# Patient Record
Sex: MALE | Race: WHITE | NOT HISPANIC OR LATINO | URBAN - METROPOLITAN AREA
[De-identification: names, ages, dates, MRNs, and addresses within clinical notes are randomized per-mention and may not be internally consistent; named-entity substitution may affect disease eponyms.]

---

## 2017-08-01 ENCOUNTER — OUTPATIENT (OUTPATIENT)
Dept: OUTPATIENT SERVICES | Facility: HOSPITAL | Age: 82
LOS: 1 days | End: 2017-08-01
Payer: MEDICARE

## 2017-08-01 VITALS
HEIGHT: 64 IN | RESPIRATION RATE: 16 BRPM | DIASTOLIC BLOOD PRESSURE: 58 MMHG | TEMPERATURE: 98 F | OXYGEN SATURATION: 97 % | SYSTOLIC BLOOD PRESSURE: 126 MMHG | HEART RATE: 76 BPM | WEIGHT: 205.03 LBS

## 2017-08-01 DIAGNOSIS — Z90.49 ACQUIRED ABSENCE OF OTHER SPECIFIED PARTS OF DIGESTIVE TRACT: Chronic | ICD-10-CM

## 2017-08-01 DIAGNOSIS — M19.90 UNSPECIFIED OSTEOARTHRITIS, UNSPECIFIED SITE: ICD-10-CM

## 2017-08-01 DIAGNOSIS — Z98.890 OTHER SPECIFIED POSTPROCEDURAL STATES: Chronic | ICD-10-CM

## 2017-08-01 DIAGNOSIS — Z01.818 ENCOUNTER FOR OTHER PREPROCEDURAL EXAMINATION: ICD-10-CM

## 2017-08-01 DIAGNOSIS — M16.12 UNILATERAL PRIMARY OSTEOARTHRITIS, LEFT HIP: ICD-10-CM

## 2017-08-01 LAB
ALBUMIN SERPL ELPH-MCNC: 3.9 G/DL — SIGNIFICANT CHANGE UP (ref 3.3–5)
ALP SERPL-CCNC: 139 U/L — HIGH (ref 30–120)
ALT FLD-CCNC: 18 U/L DA — SIGNIFICANT CHANGE UP (ref 10–60)
ANION GAP SERPL CALC-SCNC: 8 MMOL/L — SIGNIFICANT CHANGE UP (ref 5–17)
APTT BLD: 33.6 SEC — SIGNIFICANT CHANGE UP (ref 27.5–37.4)
AST SERPL-CCNC: 24 U/L — SIGNIFICANT CHANGE UP (ref 10–40)
BILIRUB SERPL-MCNC: 0.6 MG/DL — SIGNIFICANT CHANGE UP (ref 0.2–1.2)
BLD GP AB SCN SERPL QL: SIGNIFICANT CHANGE UP
BUN SERPL-MCNC: 40 MG/DL — HIGH (ref 7–23)
CALCIUM SERPL-MCNC: 9.5 MG/DL — SIGNIFICANT CHANGE UP (ref 8.4–10.5)
CHLORIDE SERPL-SCNC: 106 MMOL/L — SIGNIFICANT CHANGE UP (ref 96–108)
CO2 SERPL-SCNC: 30 MMOL/L — SIGNIFICANT CHANGE UP (ref 22–31)
CREAT SERPL-MCNC: 2.1 MG/DL — HIGH (ref 0.5–1.3)
GLUCOSE SERPL-MCNC: 102 MG/DL — HIGH (ref 70–99)
HCT VFR BLD CALC: 39 % — SIGNIFICANT CHANGE UP (ref 39–50)
HGB BLD-MCNC: 12.8 G/DL — LOW (ref 13–17)
INR BLD: 1.05 RATIO — SIGNIFICANT CHANGE UP (ref 0.88–1.16)
MCHC RBC-ENTMCNC: 30.5 PG — SIGNIFICANT CHANGE UP (ref 27–34)
MCHC RBC-ENTMCNC: 32.7 GM/DL — SIGNIFICANT CHANGE UP (ref 32–36)
MCV RBC AUTO: 93.2 FL — SIGNIFICANT CHANGE UP (ref 80–100)
PLATELET # BLD AUTO: 199 K/UL — SIGNIFICANT CHANGE UP (ref 150–400)
POTASSIUM SERPL-MCNC: 3.8 MMOL/L — SIGNIFICANT CHANGE UP (ref 3.5–5.3)
POTASSIUM SERPL-SCNC: 3.8 MMOL/L — SIGNIFICANT CHANGE UP (ref 3.5–5.3)
PROT SERPL-MCNC: 7.2 G/DL — SIGNIFICANT CHANGE UP (ref 6–8.3)
PROTHROM AB SERPL-ACNC: 11.5 SEC — SIGNIFICANT CHANGE UP (ref 9.8–12.7)
RBC # BLD: 4.18 M/UL — LOW (ref 4.2–5.8)
RBC # FLD: 12.1 % — SIGNIFICANT CHANGE UP (ref 10.3–14.5)
SODIUM SERPL-SCNC: 144 MMOL/L — SIGNIFICANT CHANGE UP (ref 135–145)
WBC # BLD: 6.5 K/UL — SIGNIFICANT CHANGE UP (ref 3.8–10.5)
WBC # FLD AUTO: 6.5 K/UL — SIGNIFICANT CHANGE UP (ref 3.8–10.5)

## 2017-08-01 PROCEDURE — 87640 STAPH A DNA AMP PROBE: CPT

## 2017-08-01 PROCEDURE — 80053 COMPREHEN METABOLIC PANEL: CPT

## 2017-08-01 PROCEDURE — 86901 BLOOD TYPING SEROLOGIC RH(D): CPT

## 2017-08-01 PROCEDURE — 87641 MR-STAPH DNA AMP PROBE: CPT

## 2017-08-01 PROCEDURE — 86900 BLOOD TYPING SEROLOGIC ABO: CPT

## 2017-08-01 PROCEDURE — 85730 THROMBOPLASTIN TIME PARTIAL: CPT

## 2017-08-01 PROCEDURE — 93010 ELECTROCARDIOGRAM REPORT: CPT | Mod: NC

## 2017-08-01 PROCEDURE — 86850 RBC ANTIBODY SCREEN: CPT

## 2017-08-01 PROCEDURE — 93005 ELECTROCARDIOGRAM TRACING: CPT

## 2017-08-01 PROCEDURE — 71046 X-RAY EXAM CHEST 2 VIEWS: CPT

## 2017-08-01 PROCEDURE — 71020: CPT | Mod: 26

## 2017-08-01 PROCEDURE — G0463: CPT

## 2017-08-01 PROCEDURE — 36415 COLL VENOUS BLD VENIPUNCTURE: CPT

## 2017-08-01 PROCEDURE — 85610 PROTHROMBIN TIME: CPT

## 2017-08-01 PROCEDURE — 85027 COMPLETE CBC AUTOMATED: CPT

## 2017-08-01 NOTE — H&P PST ADULT - PMH
Fluid retention in legs    Prostate cancer  48 treatments of RT  Seasonal allergies Fluid retention in legs    Hearing loss    Osteoarthritis    Prostate cancer  48 treatments of RT  Seasonal allergies

## 2017-08-01 NOTE — H&P PST ADULT - HISTORY OF PRESENT ILLNESS
85 yo male presents with long history of left hip pain that has increased in severity over past 1 year.  Pain is intermittent and is 1/10 at rest and increases to 8/10 with activity.  Ambulates with walker and takes NSAIDS for pain relief.

## 2017-08-01 NOTE — H&P PST ADULT - ABILITY TO HEAR (WITH HEARING AID OR HEARING APPLIANCE IF NORMALLY USED):
Mildly to Moderately Impaired: difficulty hearing in some environments or speaker may need to increase volume or speak distinctly Mildly to Moderately Impaired: difficulty hearing in some environments or speaker may need to increase volume or speak distinctly/b/l hearing aids

## 2017-08-01 NOTE — H&P PST ADULT - MUSCULOSKELETAL
details… detailed exam decreased ROM/diminished strength/decreased ROM due to pain/no calf tenderness

## 2017-08-01 NOTE — H&P PST ADULT - NSANTHOSAYNRD_GEN_A_CORE
No. DEJA screening performed.  STOP BANG Legend: 0-2 = LOW Risk; 3-4 = INTERMEDIATE Risk; 5-8 = HIGH Risk

## 2017-08-01 NOTE — H&P PST ADULT - FAMILY HISTORY
Mother  Still living? No  Family history of rheumatic heart disease, Age at diagnosis: Age Unknown     Father  Still living? No  Family history of coronary artery disease, Age at diagnosis: Age Unknown

## 2017-08-02 LAB
MRSA PCR RESULT.: SIGNIFICANT CHANGE UP
S AUREUS DNA NOSE QL NAA+PROBE: SIGNIFICANT CHANGE UP

## 2017-08-11 RX ORDER — SODIUM CHLORIDE 9 MG/ML
1000 INJECTION, SOLUTION INTRAVENOUS
Qty: 0 | Refills: 0 | Status: DISCONTINUED | OUTPATIENT
Start: 2017-08-17 | End: 2017-08-17

## 2017-08-16 RX ORDER — MAGNESIUM HYDROXIDE 400 MG/1
30 TABLET, CHEWABLE ORAL DAILY
Qty: 0 | Refills: 0 | Status: DISCONTINUED | OUTPATIENT
Start: 2017-08-17 | End: 2017-08-19

## 2017-08-16 RX ORDER — DOCUSATE SODIUM 100 MG
100 CAPSULE ORAL THREE TIMES A DAY
Qty: 0 | Refills: 0 | Status: DISCONTINUED | OUTPATIENT
Start: 2017-08-17 | End: 2017-08-19

## 2017-08-16 RX ORDER — POLYETHYLENE GLYCOL 3350 17 G/17G
17 POWDER, FOR SOLUTION ORAL DAILY
Qty: 0 | Refills: 0 | Status: DISCONTINUED | OUTPATIENT
Start: 2017-08-17 | End: 2017-08-19

## 2017-08-16 RX ORDER — ONDANSETRON 8 MG/1
4 TABLET, FILM COATED ORAL EVERY 6 HOURS
Qty: 0 | Refills: 0 | Status: DISCONTINUED | OUTPATIENT
Start: 2017-08-17 | End: 2017-08-19

## 2017-08-16 RX ORDER — SENNA PLUS 8.6 MG/1
2 TABLET ORAL AT BEDTIME
Qty: 0 | Refills: 0 | Status: DISCONTINUED | OUTPATIENT
Start: 2017-08-17 | End: 2017-08-19

## 2017-08-16 RX ORDER — APREPITANT 80 MG/1
40 CAPSULE ORAL ONCE
Qty: 0 | Refills: 0 | Status: COMPLETED | OUTPATIENT
Start: 2017-08-17 | End: 2017-08-17

## 2017-08-16 RX ORDER — PANTOPRAZOLE SODIUM 20 MG/1
40 TABLET, DELAYED RELEASE ORAL
Qty: 0 | Refills: 0 | Status: DISCONTINUED | OUTPATIENT
Start: 2017-08-17 | End: 2017-08-19

## 2017-08-16 RX ORDER — SODIUM CHLORIDE 9 MG/ML
1000 INJECTION, SOLUTION INTRAVENOUS
Qty: 0 | Refills: 0 | Status: DISCONTINUED | OUTPATIENT
Start: 2017-08-17 | End: 2017-08-18

## 2017-08-16 NOTE — PATIENT PROFILE ADULT. - PMH
Fluid retention in legs    Hearing loss    Osteoarthritis    Prostate cancer  48 treatments of RT  Seasonal allergies

## 2017-08-17 ENCOUNTER — INPATIENT (INPATIENT)
Facility: HOSPITAL | Age: 82
LOS: 1 days | Discharge: NURSING, REHAB OR LONG-TERM CARE | DRG: 470 | End: 2017-08-19
Attending: ORTHOPAEDIC SURGERY | Admitting: ORTHOPAEDIC SURGERY
Payer: MEDICARE

## 2017-08-17 ENCOUNTER — TRANSCRIPTION ENCOUNTER (OUTPATIENT)
Age: 82
End: 2017-08-17

## 2017-08-17 VITALS
RESPIRATION RATE: 11 BRPM | SYSTOLIC BLOOD PRESSURE: 132 MMHG | HEART RATE: 76 BPM | HEIGHT: 64 IN | DIASTOLIC BLOOD PRESSURE: 57 MMHG | WEIGHT: 198.86 LBS | OXYGEN SATURATION: 95 % | TEMPERATURE: 99 F

## 2017-08-17 DIAGNOSIS — M16.12 UNILATERAL PRIMARY OSTEOARTHRITIS, LEFT HIP: ICD-10-CM

## 2017-08-17 DIAGNOSIS — H91.90 UNSPECIFIED HEARING LOSS, UNSPECIFIED EAR: ICD-10-CM

## 2017-08-17 DIAGNOSIS — Z47.1 AFTERCARE FOLLOWING JOINT REPLACEMENT SURGERY: ICD-10-CM

## 2017-08-17 DIAGNOSIS — M19.90 UNSPECIFIED OSTEOARTHRITIS, UNSPECIFIED SITE: ICD-10-CM

## 2017-08-17 DIAGNOSIS — J30.2 OTHER SEASONAL ALLERGIC RHINITIS: ICD-10-CM

## 2017-08-17 DIAGNOSIS — Z90.49 ACQUIRED ABSENCE OF OTHER SPECIFIED PARTS OF DIGESTIVE TRACT: Chronic | ICD-10-CM

## 2017-08-17 DIAGNOSIS — Z98.890 OTHER SPECIFIED POSTPROCEDURAL STATES: Chronic | ICD-10-CM

## 2017-08-17 DIAGNOSIS — R60.0 LOCALIZED EDEMA: ICD-10-CM

## 2017-08-17 DIAGNOSIS — C61 MALIGNANT NEOPLASM OF PROSTATE: ICD-10-CM

## 2017-08-17 LAB
ANION GAP SERPL CALC-SCNC: 8 MMOL/L — SIGNIFICANT CHANGE UP (ref 5–17)
BUN SERPL-MCNC: 29 MG/DL — HIGH (ref 7–23)
CALCIUM SERPL-MCNC: 8.5 MG/DL — SIGNIFICANT CHANGE UP (ref 8.4–10.5)
CHLORIDE SERPL-SCNC: 110 MMOL/L — HIGH (ref 96–108)
CO2 SERPL-SCNC: 25 MMOL/L — SIGNIFICANT CHANGE UP (ref 22–31)
CREAT SERPL-MCNC: 1.43 MG/DL — HIGH (ref 0.5–1.3)
GLUCOSE SERPL-MCNC: 146 MG/DL — HIGH (ref 70–99)
HCT VFR BLD CALC: 34.2 % — LOW (ref 39–50)
HGB BLD-MCNC: 11.3 G/DL — LOW (ref 13–17)
MCHC RBC-ENTMCNC: 31.2 PG — SIGNIFICANT CHANGE UP (ref 27–34)
MCHC RBC-ENTMCNC: 32.9 GM/DL — SIGNIFICANT CHANGE UP (ref 32–36)
MCV RBC AUTO: 94.7 FL — SIGNIFICANT CHANGE UP (ref 80–100)
PLATELET # BLD AUTO: 151 K/UL — SIGNIFICANT CHANGE UP (ref 150–400)
POTASSIUM SERPL-MCNC: 3.9 MMOL/L — SIGNIFICANT CHANGE UP (ref 3.5–5.3)
POTASSIUM SERPL-SCNC: 3.9 MMOL/L — SIGNIFICANT CHANGE UP (ref 3.5–5.3)
RBC # BLD: 3.62 M/UL — LOW (ref 4.2–5.8)
RBC # FLD: 11.9 % — SIGNIFICANT CHANGE UP (ref 10.3–14.5)
SODIUM SERPL-SCNC: 143 MMOL/L — SIGNIFICANT CHANGE UP (ref 135–145)
WBC # BLD: 8.2 K/UL — SIGNIFICANT CHANGE UP (ref 3.8–10.5)
WBC # FLD AUTO: 8.2 K/UL — SIGNIFICANT CHANGE UP (ref 3.8–10.5)

## 2017-08-17 PROCEDURE — 88311 DECALCIFY TISSUE: CPT | Mod: 26

## 2017-08-17 PROCEDURE — 88305 TISSUE EXAM BY PATHOLOGIST: CPT | Mod: 26

## 2017-08-17 PROCEDURE — 99223 1ST HOSP IP/OBS HIGH 75: CPT

## 2017-08-17 RX ORDER — BICALUTAMIDE 50 MG/1
50 TABLET, FILM COATED ORAL DAILY
Qty: 0 | Refills: 0 | Status: DISCONTINUED | OUTPATIENT
Start: 2017-08-17 | End: 2017-08-19

## 2017-08-17 RX ORDER — HYDROMORPHONE HYDROCHLORIDE 2 MG/ML
0.5 INJECTION INTRAMUSCULAR; INTRAVENOUS; SUBCUTANEOUS
Qty: 0 | Refills: 0 | Status: DISCONTINUED | OUTPATIENT
Start: 2017-08-17 | End: 2017-08-19

## 2017-08-17 RX ORDER — DOXAZOSIN MESYLATE 4 MG
1 TABLET ORAL
Qty: 0 | Refills: 0 | COMMUNITY

## 2017-08-17 RX ORDER — HYDROMORPHONE HYDROCHLORIDE 2 MG/ML
0.5 INJECTION INTRAMUSCULAR; INTRAVENOUS; SUBCUTANEOUS
Qty: 0 | Refills: 0 | Status: DISCONTINUED | OUTPATIENT
Start: 2017-08-17 | End: 2017-08-17

## 2017-08-17 RX ORDER — CEFAZOLIN SODIUM 1 G
2000 VIAL (EA) INJECTION ONCE
Qty: 0 | Refills: 0 | Status: COMPLETED | OUTPATIENT
Start: 2017-08-17 | End: 2017-08-17

## 2017-08-17 RX ORDER — CEFAZOLIN SODIUM 1 G
2000 VIAL (EA) INJECTION EVERY 8 HOURS
Qty: 0 | Refills: 0 | Status: COMPLETED | OUTPATIENT
Start: 2017-08-17 | End: 2017-08-17

## 2017-08-17 RX ORDER — ONDANSETRON 8 MG/1
4 TABLET, FILM COATED ORAL ONCE
Qty: 0 | Refills: 0 | Status: DISCONTINUED | OUTPATIENT
Start: 2017-08-17 | End: 2017-08-17

## 2017-08-17 RX ORDER — OXYCODONE HYDROCHLORIDE 5 MG/1
10 TABLET ORAL
Qty: 0 | Refills: 0 | Status: DISCONTINUED | OUTPATIENT
Start: 2017-08-17 | End: 2017-08-19

## 2017-08-17 RX ORDER — DOXAZOSIN MESYLATE 4 MG
4 TABLET ORAL DAILY
Qty: 0 | Refills: 0 | Status: DISCONTINUED | OUTPATIENT
Start: 2017-08-17 | End: 2017-08-19

## 2017-08-17 RX ORDER — METOPROLOL TARTRATE 50 MG
25 TABLET ORAL DAILY
Qty: 0 | Refills: 0 | Status: DISCONTINUED | OUTPATIENT
Start: 2017-08-17 | End: 2017-08-19

## 2017-08-17 RX ORDER — SODIUM CHLORIDE 9 MG/ML
1000 INJECTION, SOLUTION INTRAVENOUS
Qty: 0 | Refills: 0 | Status: DISCONTINUED | OUTPATIENT
Start: 2017-08-17 | End: 2017-08-17

## 2017-08-17 RX ORDER — ACETAMINOPHEN 500 MG
1000 TABLET ORAL ONCE
Qty: 0 | Refills: 0 | Status: COMPLETED | OUTPATIENT
Start: 2017-08-17 | End: 2017-08-17

## 2017-08-17 RX ORDER — BENZOCAINE AND MENTHOL 5; 1 G/100ML; G/100ML
1 LIQUID ORAL EVERY 4 HOURS
Qty: 0 | Refills: 0 | Status: DISCONTINUED | OUTPATIENT
Start: 2017-08-17 | End: 2017-08-19

## 2017-08-17 RX ORDER — LORATADINE 10 MG/1
10 TABLET ORAL DAILY
Qty: 0 | Refills: 0 | Status: DISCONTINUED | OUTPATIENT
Start: 2017-08-17 | End: 2017-08-19

## 2017-08-17 RX ORDER — APIXABAN 2.5 MG/1
2.5 TABLET, FILM COATED ORAL EVERY 12 HOURS
Qty: 0 | Refills: 0 | Status: DISCONTINUED | OUTPATIENT
Start: 2017-08-18 | End: 2017-08-19

## 2017-08-17 RX ORDER — OXYCODONE HYDROCHLORIDE 5 MG/1
5 TABLET ORAL
Qty: 0 | Refills: 0 | Status: DISCONTINUED | OUTPATIENT
Start: 2017-08-17 | End: 2017-08-19

## 2017-08-17 RX ORDER — ACETAMINOPHEN 500 MG
1000 TABLET ORAL EVERY 8 HOURS
Qty: 0 | Refills: 0 | Status: DISCONTINUED | OUTPATIENT
Start: 2017-08-18 | End: 2017-08-19

## 2017-08-17 RX ORDER — ACETAMINOPHEN 500 MG
1000 TABLET ORAL EVERY 6 HOURS
Qty: 0 | Refills: 0 | Status: COMPLETED | OUTPATIENT
Start: 2017-08-17 | End: 2017-08-18

## 2017-08-17 RX ADMIN — Medication 400 MILLIGRAM(S): at 16:43

## 2017-08-17 RX ADMIN — SODIUM CHLORIDE 100 MILLILITER(S): 9 INJECTION, SOLUTION INTRAVENOUS at 11:44

## 2017-08-17 RX ADMIN — HYDROMORPHONE HYDROCHLORIDE 0.5 MILLIGRAM(S): 2 INJECTION INTRAMUSCULAR; INTRAVENOUS; SUBCUTANEOUS at 11:43

## 2017-08-17 RX ADMIN — Medication 1000 MILLIGRAM(S): at 17:15

## 2017-08-17 RX ADMIN — Medication 1000 MILLIGRAM(S): at 21:58

## 2017-08-17 RX ADMIN — ONDANSETRON 4 MILLIGRAM(S): 8 TABLET, FILM COATED ORAL at 14:58

## 2017-08-17 RX ADMIN — Medication 400 MILLIGRAM(S): at 21:52

## 2017-08-17 RX ADMIN — Medication 100 MILLIGRAM(S): at 21:53

## 2017-08-17 RX ADMIN — HYDROMORPHONE HYDROCHLORIDE 0.5 MILLIGRAM(S): 2 INJECTION INTRAMUSCULAR; INTRAVENOUS; SUBCUTANEOUS at 12:05

## 2017-08-17 RX ADMIN — APREPITANT 40 MILLIGRAM(S): 80 CAPSULE ORAL at 07:31

## 2017-08-17 RX ADMIN — Medication 100 MILLIGRAM(S): at 23:34

## 2017-08-17 RX ADMIN — SODIUM CHLORIDE 100 MILLILITER(S): 9 INJECTION, SOLUTION INTRAVENOUS at 16:43

## 2017-08-17 RX ADMIN — Medication 100 MILLIGRAM(S): at 16:43

## 2017-08-17 RX ADMIN — HYDROMORPHONE HYDROCHLORIDE 0.5 MILLIGRAM(S): 2 INJECTION INTRAMUSCULAR; INTRAVENOUS; SUBCUTANEOUS at 14:11

## 2017-08-17 RX ADMIN — SODIUM CHLORIDE 75 MILLILITER(S): 9 INJECTION, SOLUTION INTRAVENOUS at 07:31

## 2017-08-17 NOTE — DISCHARGE NOTE ADULT - CARE PROVIDER_API CALL
HARVEY Perry (MD), Orthopaedic Surgery  [address] [place] [address]  Phone: [phone]  Fax: [phone]

## 2017-08-17 NOTE — PHYSICAL THERAPY INITIAL EVALUATION ADULT - GAIT DEVIATIONS NOTED, PT EVAL
decreased step length/decreased claudia/decreased weight-shifting ability/decreased stride length/decreased velocity of limb motion

## 2017-08-17 NOTE — BRIEF OPERATIVE NOTE - PROCEDURE
Total hip replacement  08/17/2017    Active  [id number] Total hip replacement  08/17/2017    Active  Levi Urban

## 2017-08-17 NOTE — CONSULT NOTE ADULT - SUBJECTIVE AND OBJECTIVE BOX
Patient is a 84y old  Male who presents with a chief complaint of "left hip" (16 Aug 2017 16:39)      HPI: pt is a 85 yo male with PMH of OA, hearing loss, prostate cancer who has left hip pain for long time but worse since last year. it did not respond to medical therapy, worse on walking, better with rest.  she just had left THR with out complication.    PAST MEDICAL & SURGICAL HISTORY:  Hearing loss  Osteoarthritis  Fluid retention in legs  Seasonal allergies  Prostate cancer: 48 treatments of RT  S/P inguinal hernia repair: 1961  S/P cholecystectomy: 1985  History of appendectomy      REVIEW OF SYSTEMS:    CONSTITUTIONAL: No fever, weight loss, or fatigue  EYES: No eye pain, visual disturbances, or discharge  ENMT:  No difficulty hearing, tinnitus, vertigo; No sinus or throat pain  NECK: No pain or stiffness  BREASTS: No pain, masses, or nipple discharge  RESPIRATORY: No cough, wheezing, chills or hemoptysis; No shortness of breath  CARDIOVASCULAR: No chest pain, palpitations, dizziness, or leg swelling  GASTROINTESTINAL: No abdominal or epigastric pain. No nausea, vomiting, or hematemesis; No diarrhea or constipation. No melena or hematochezia.  GENITOURINARY: No dysuria, frequency, hematuria, or incontinence  NEUROLOGICAL: No headaches, memory loss, loss of strength, numbness, or tremors  SKIN: No itching, burning, rashes, or lesions   LYMPH NODES: No enlarged glands  ENDOCRINE: No heat or cold intolerance; No hair loss  MUSCULOSKELETAL: No muscle or back pain  PSYCHIATRIC: No depression, anxiety, mood swings, or difficulty sleeping  HEME/LYMPH: No easy bruising, or bleeding gums  ALLERGY AND IMMUNOLOGIC: No hives or eczema      MEDICATIONS  (STANDING): reviewed     MEDICATIONS  (PRN): reviewed       Allergies    No Known Allergies    Intolerances        SOCIAL HISTORY:  Alochol: Denied  Smoking: Nonsmoker  Drug Use: Denied  Marital Status:           FAMILY HISTORY:  Family history of coronary artery disease (Father)  Family history of rheumatic heart disease (Mother)      Vital Signs Last 24 Hrs  T(C): 37 (17 Aug 2017 07:17), Max: 37 (17 Aug 2017 07:17)  T(F): 98.6 (17 Aug 2017 07:17), Max: 98.6 (17 Aug 2017 07:17)  HR: 76 (17 Aug 2017 07:17) (76 - 76)  BP: 132/57 (17 Aug 2017 07:17) (132/57 - 132/57)  BP(mean): --  RR: 11 (17 Aug 2017 07:17) (11 - 11)  SpO2: 95% (17 Aug 2017 07:17) (95% - 95%)    PHYSICAL EXAM:    GENERAL: NAD, well-groomed, well-developed  HEAD:  Atraumatic, Normocephalic  EYES: EOMI, PERRLA, conjunctiva and sclera clear  ENMT: No tonsillar erythema, exudates, or enlargement; Moist mucous membranes, Good dentition, No lesions  NECK: Supple, No JVD, Normal thyroid  NERVOUS SYSTEM:  Alert & Oriented X3, Good concentration; Motor Strength 5/5 B/L upper and lower extremities; DTRs 2+ intact and symmetric  CHEST/LUNG: Clear to percussion bilaterally; No rales, rhonchi, wheezing, or rubs  HEART: Regular rate and rhythm; No murmurs, rubs, or gallops  ABDOMEN: Soft, Nontender, Nondistended; Bowel sounds present  EXTREMITIES:  2+ Peripheral Pulses, No clubbing, cyanosis, or edema  LYMPH: No lymphadenopathy notedRECTAL: No masses, prostate normal size and smooth, Guiac negative   BREAST: No palpatble masses, skin no lesions no retractions, no discharages. adenexa no palpable masses noted   GYN: uterus normal size, adenexa no palpable masses, no CMT, no uterine discharge   SKIN: No rashes or lesions  INCISION:     LABS:              CAPILLARY BLOOD GLUCOSE          RADIOLOGY & ADDITIONAL STUDIES:

## 2017-08-17 NOTE — DISCHARGE NOTE ADULT - PATIENT PORTAL LINK FT
“You can access the [facility] Patient Portal, offered by [facility], by registering with the following website: [email or web address]”

## 2017-08-17 NOTE — DISCHARGE NOTE ADULT - NS AS ACTIVITY OBS
Walking-Indoors allowed/Showering allowed/Do not drive or operate machinery/Do not make important decisions/Stairs allowed

## 2017-08-17 NOTE — PROGRESS NOTE ADULT - SUBJECTIVE AND OBJECTIVE BOX
POST OPERATIVE DAY #: 0    84y Male  s/p  Left  Anterior THR                        SUBJECTIVE: Patient seen and examined at bedside. c/o nausea no vomiting     Pain:  well controlled     Pain scale:   2/10  Denies CP, SOB, V/D, weakness, numbness   No new complains     OBJECTIVE:     Vital Signs Last 24 Hrs  T(C): 36.1 (17 Aug 2017 14:48), Max: 37 (17 Aug 2017 07:17)  T(F): 97 (17 Aug 2017 14:48), Max: 98.6 (17 Aug 2017 07:17)  HR: 64 (17 Aug 2017 14:48) (60 - 89)  BP: 110/50 (17 Aug 2017 14:48) (102/76 - 132/57)  BP(mean): --  RR: 18 (17 Aug 2017 14:48) (11 - 20)  SpO2: 98% (17 Aug 2017 14:48) (95% - 100%)    Affected extremity: LLE         Dressing: clean/dry/intact                     HV x2 intact, on self suction         Sensation: intact to light touch          Motor exam: 5  / 5 Tibialis Anterior/Gastrocnemius-Soleus, EHL/FHL         warm, well-perfused; capillary refill < 3 seconds         negative calf tenderness B/L LE  HV1: 30cc  HV2:0cc    MEDICATIONS:  Infection prophylaxis:  ceFAZolin   IVPB 2000 milliGRAM(s) IV Intermittent every 8 hours    Pain management:  ondansetron Injectable 4 milliGRAM(s) IV Push every 6 hours PRN  acetaminophen  IVPB. 1000 milliGRAM(s) IV Intermittent every 6 hours  oxyCODONE    IR 10 milliGRAM(s) Oral every 3 hours PRN  oxyCODONE    IR 5 milliGRAM(s) Oral every 3 hours PRN  HYDROmorphone  Injectable 0.5 milliGRAM(s) IV Push every 3 hours PRN    DVT prophylaxis: Eliquis      RADIOLOGY & ADDITIONAL STUDIES:    ASSESSMENT AND PLAN:     - Analgesic pain control  - DVT prophylaxis: Eliquis2.5mg twice a day   SCDs       - Antibiotics:  Ancef   for 24 hours   - HO prophylaxis: none, 2/2 CKD  - PT/OT: Weight Bearing Status:  Weight bearing as tolerated, OOBTC           Anterior Hip precautions     -  Incentive spirometry  - IVF  - Advance diet as tolerated  - Hospitalist is following  - Follow up HV output  -  Follow up labs  -  Disposition:   Subacute Rehab POST OPERATIVE DAY #: 0    84y Male  s/p  Left  Anterior THR                        SUBJECTIVE: Patient seen and examined at bedside. c/o nausea no vomiting     Pain:  well controlled     Pain scale:   2/10  Denies CP, SOB, V/D, weakness, numbness   No new complains     OBJECTIVE:     Vital Signs Last 24 Hrs  T(C): 36.1 (17 Aug 2017 14:48), Max: 37 (17 Aug 2017 07:17)  T(F): 97 (17 Aug 2017 14:48), Max: 98.6 (17 Aug 2017 07:17)  HR: 64 (17 Aug 2017 14:48) (60 - 89)  BP: 110/50 (17 Aug 2017 14:48) (102/76 - 132/57)  BP(mean): --  RR: 18 (17 Aug 2017 14:48) (11 - 20)  SpO2: 98% (17 Aug 2017 14:48) (95% - 100%)    Affected extremity: LLE         Dressing: clean/dry/intact                     HV x2 intact, on self suction         Sensation: intact to light touch          Motor exam: 5  / 5 Tibialis Anterior/Gastrocnemius-Soleus, EHL/FHL         warm, well-perfused; capillary refill < 3 seconds         negative calf tenderness B/L LE  HV1: 30cc  HV2:0cc    MEDICATIONS:  Infection prophylaxis:  ceFAZolin   IVPB 2000 milliGRAM(s) IV Intermittent every 8 hours    Pain management:  ondansetron Injectable 4 milliGRAM(s) IV Push every 6 hours PRN  acetaminophen  IVPB. 1000 milliGRAM(s) IV Intermittent every 6 hours  oxyCODONE    IR 10 milliGRAM(s) Oral every 3 hours PRN  oxyCODONE    IR 5 milliGRAM(s) Oral every 3 hours PRN  HYDROmorphone  Injectable 0.5 milliGRAM(s) IV Push every 3 hours PRN    DVT prophylaxis: Eliquis      RADIOLOGY & ADDITIONAL STUDIES:    ASSESSMENT AND PLAN:     - Analgesic pain control  - DVT prophylaxis: Eliquis2.5mg twice a day   SCDs       - Antibiotics:  Ancef   for 24 hours   - HO prophylaxis: none, 2/2 CKD  - PT/OT: Weight Bearing Status:  Weight bearing as tolerated, OOBTC           Anterior Hip precautions     -  Incentive spirometry  - IVF  - Advance diet as tolerated  - Hospitalist is following  - Follow up HV output  -  Follow up labs  - ehnry Carmen in am   -  Disposition:   Subacute Rehab

## 2017-08-17 NOTE — DISCHARGE NOTE ADULT - CARE PLAN
Principal Discharge DX:	Primary osteoarthritis of left hip  Goal:	Improve ambulation, ADLs and quality of life  Instructions for follow-up, activity and diet:	Physical Therapy/Occupational Therapy for: Ambulation, transfers, stairs, & ADLs, isometrics.  Full weight bearing on both legs; Walker/cane use as instructed by Physical therapy/Occupational therapy. Anterior Total Hip Replacement precautions for  6 weeks: No straight leg raise; No external rotation of hip when extended-standing or lying flat; No hyperextension of hip when standing (kickback).   Ice packs to hip for 30 min. every 3 hours and after physical therapy.   Keep incision clean and dry. May shower 5 days after surgery if no drainage from incision.  Prineo tape/suture removal on/near after Post Op Day # 14 in rehab facility / Surgeon's office.

## 2017-08-17 NOTE — DISCHARGE NOTE ADULT - HOSPITAL COURSE
This patient was admitted to [facility] with a history of severe degenerative joint disease of the Left hip.  Patient went to Pre-Surgical Testing at [facility] and was medically cleared to undergo elective procedure.  Left THR performed on 8/17/17 by . No operative or divina-operative complications arose during patients hospital course.  Patient received antibiotic according to SCIP guidelines for infection prevention.  Eliquis was given for DVT prophylaxis.  Anesthesia, Medical Hospitalist, Physical Therapy and Occupational Therapy were consulted. Patient is stable for discharge to rehab with a good prognosis.  Appropriate discharge instructions and medications are provided in this document.

## 2017-08-17 NOTE — CONSULT NOTE ADULT - SUBJECTIVE AND OBJECTIVE BOX
Patient is a 84y old  Male who presents with a chief complaint of "left hip" (16 Aug 2017 16:39)      HPI: 83 yo m with hx of OA, hearing loss and prostate cancer, has left hip pain worse on walikng and better with rest, did not improve with medical therapy, just had left THR without complication.     PAST MEDICAL & SURGICAL HISTORY:  Hearing loss  Osteoarthritis  Fluid retention in legs  Seasonal allergies  Prostate cancer: 48 treatments of RT  S/P inguinal hernia repair: 1961  S/P cholecystectomy: 1985  History of appendectomy      REVIEW OF SYSTEMS:    CONSTITUTIONAL: No fever, weight loss, or fatigue  EYES: No eye pain, visual disturbances, or discharge  ENMT:  No difficulty hearing, tinnitus, vertigo; No sinus or throat pain  NECK: No pain or stiffness  BREASTS: No pain, masses, or nipple discharge  RESPIRATORY: No cough, wheezing, chills or hemoptysis; No shortness of breath  CARDIOVASCULAR: No chest pain, palpitations, dizziness, or leg swelling  GASTROINTESTINAL: No abdominal or epigastric pain. No nausea, vomiting, or hematemesis; No diarrhea or constipation. No melena or hematochezia.  GENITOURINARY: No dysuria, frequency, hematuria, or incontinence  NEUROLOGICAL: No headaches, memory loss, loss of strength, numbness, or tremors  SKIN: No itching, burning, rashes, or lesions   LYMPH NODES: No enlarged glands  ENDOCRINE: No heat or cold intolerance; No hair loss  MUSCULOSKELETAL: No muscle or back pain  PSYCHIATRIC: No depression, anxiety, mood swings, or difficulty sleeping  HEME/LYMPH: No easy bruising, or bleeding gums  ALLERGY AND IMMUNOLOGIC: No hives or eczema      MEDICATIONS  (STANDING):  lactated ringers. 1000 milliLiter(s) (100 mL/Hr) IV Continuous <Continuous>    MEDICATIONS  (PRN):  HYDROmorphone  Injectable 0.5 milliGRAM(s) IV Push every 10 minutes PRN Moderate Pain (4 - 6)  ondansetron Injectable 4 milliGRAM(s) IV Push once PRN Nausea and/or Vomiting      Allergies    No Known Allergies    Intolerances        SOCIAL HISTORY:  Alochol: Denied  Smoking: Former smoker  Drug Use: Denied  Marital Status:           FAMILY HISTORY:  Family history of coronary artery disease (Father)  Family history of rheumatic heart disease (Mother)      Vital Signs Last 24 Hrs  T(C): 36.6 (17 Aug 2017 11:18), Max: 37 (17 Aug 2017 07:17)  T(F): 97.9 (17 Aug 2017 11:18), Max: 98.6 (17 Aug 2017 07:17)  HR: 74 (17 Aug 2017 11:50) (74 - 89)  BP: 122/58 (17 Aug 2017 11:50) (102/76 - 132/57)  BP(mean): --  RR: 20 (17 Aug 2017 11:50) (11 - 20)  SpO2: 98% (17 Aug 2017 11:50) (95% - 99%)    PHYSICAL EXAM:    GENERAL: NAD, well-groomed, well-developed  HEAD:  Atraumatic, Normocephalic  EYES: EOMI, PERRLA, conjunctiva and sclera clear  ENMT: No tonsillar erythema, exudates, or enlargement; Moist mucous membranes, Good dentition, No lesions  NECK: Supple, No JVD, Normal thyroid  NERVOUS SYSTEM:  Alert & Oriented X3, Good concentration; Motor Strength 5/5 B/L upper and lower extremities; DTRs 2+ intact and symmetric  CHEST/LUNG: Clear to percussion bilaterally; No rales, rhonchi, wheezing, or rubs  HEART: Regular rate and rhythm; No murmurs, rubs, or gallops  ABDOMEN: Soft, Nontender, Nondistended; Bowel sounds present  EXTREMITIES:  2+ Peripheral Pulses, No clubbing, cyanosis, or edema  LYMPH: No lymphadenopathy  SKIN: No rashes or lesions  INCISION: intact    LABS: pendfing              CAPILLARY BLOOD GLUCOSE          RADIOLOGY & ADDITIONAL STUDIES: CXR clear.

## 2017-08-17 NOTE — PHYSICAL THERAPY INITIAL EVALUATION ADULT - GENERAL OBSERVATIONS, REHAB EVAL
pt received semisupine in bed, +2 hemovacs, +rodrigues, +IV, +supplemental oxygen, +PAS, +hearing aides

## 2017-08-17 NOTE — CONSULT NOTE ADULT - PROBLEM SELECTOR RECOMMENDATION 9
s/p left THR, cont pain control with IV dilaudid, PT OT, DVT ppx with Eliquis  pt is looking pale and sleepy, will keep him on remote tel for now.

## 2017-08-17 NOTE — DISCHARGE NOTE ADULT - MEDICATION SUMMARY - MEDICATIONS TO TAKE
I will START or STAY ON the medications listed below when I get home from the hospital:    oxyCODONE 10 mg oral tablet  -- 1 tab(s) by mouth every 3 hours, As needed, Moderate Pain (4 - 6)  -- Indication: For Pain    oxyCODONE 5 mg oral tablet  -- 1 tab(s) by mouth every 3 hours, As needed, Mild Pain (1 - 3)  -- Indication: For Pain    acetaminophen 500 mg oral tablet  -- 2 tab(s) by mouth every 8 hours  -- Indication: For Pain    doxazosin 4 mg oral tablet  -- 1 tab(s) by mouth once a day  -- Indication: For Heart    apixaban 2.5 mg oral tablet  -- 1 tab(s) by mouth every 12 hours  -- Indication: For Prevent blood clots    loratadine 10 mg oral tablet  -- 1 tab(s) by mouth once a day  -- Indication: For Allergies    bicalutamide 50 mg oral tablet  -- 1 tab(s) by mouth every 24 hours  -- Indication: For Antineoplastic    Lupron Depot 22.5 mg/3 months intramuscular injection, extended release  --  intramuscular   -- Indication: For Antineoplastic    metoprolol succinate 25 mg oral tablet, extended release  -- 1 tab(s) by mouth once a day  -- Indication: For Htn    furosemide 40 mg oral tablet  -- 1 tab(s) by mouth once a day  -- Indication: For Htn    senna oral tablet  -- 2 tab(s) by mouth once a day (at bedtime)  -- Indication: For Constipation    bisacodyl 10 mg rectal suppository  -- 1 suppository(ies) rectally once a day, As needed, If no bowel movement by POD#2  -- Indication: For Constipation    docusate sodium 100 mg oral capsule  -- 1 cap(s) by mouth 3 times a day  -- Indication: For Constipation    polyethylene glycol 3350 oral powder for reconstitution  -- 17 gram(s) by mouth once a day, As needed, Constipation  -- Indication: For Constipation    benzocaine-menthol 15 mg-3.6 mg mucous membrane lozenge  --  mucous membrane   -- Indication: For Sore throat    pantoprazole 40 mg oral delayed release tablet  -- 1 tab(s) by mouth once a day (before a meal)  -- Indication: For reflux    fluticasone propionate  --     -- Indication: For lungs    Centrum Silver oral tablet  -- 1 tab(s) by mouth once a day  -- Indication: For Supp    Vitamin C 500 mg oral tablet  -- 1 tab(s) by mouth once a day  -- Indication: For Supp

## 2017-08-18 DIAGNOSIS — D50.0 IRON DEFICIENCY ANEMIA SECONDARY TO BLOOD LOSS (CHRONIC): ICD-10-CM

## 2017-08-18 DIAGNOSIS — N18.3 CHRONIC KIDNEY DISEASE, STAGE 3 (MODERATE): ICD-10-CM

## 2017-08-18 LAB
ANION GAP SERPL CALC-SCNC: 10 MMOL/L — SIGNIFICANT CHANGE UP (ref 5–17)
BUN SERPL-MCNC: 25 MG/DL — HIGH (ref 7–23)
CALCIUM SERPL-MCNC: 8.1 MG/DL — LOW (ref 8.4–10.5)
CHLORIDE SERPL-SCNC: 108 MMOL/L — SIGNIFICANT CHANGE UP (ref 96–108)
CO2 SERPL-SCNC: 24 MMOL/L — SIGNIFICANT CHANGE UP (ref 22–31)
CREAT SERPL-MCNC: 1.38 MG/DL — HIGH (ref 0.5–1.3)
GLUCOSE SERPL-MCNC: 117 MG/DL — HIGH (ref 70–99)
HCT VFR BLD CALC: 31.2 % — LOW (ref 39–50)
HGB BLD-MCNC: 10.3 G/DL — LOW (ref 13–17)
MCHC RBC-ENTMCNC: 31.8 PG — SIGNIFICANT CHANGE UP (ref 27–34)
MCHC RBC-ENTMCNC: 33.1 GM/DL — SIGNIFICANT CHANGE UP (ref 32–36)
MCV RBC AUTO: 96.2 FL — SIGNIFICANT CHANGE UP (ref 80–100)
PLATELET # BLD AUTO: 145 K/UL — LOW (ref 150–400)
POTASSIUM SERPL-MCNC: 4.9 MMOL/L — SIGNIFICANT CHANGE UP (ref 3.5–5.3)
POTASSIUM SERPL-SCNC: 4.9 MMOL/L — SIGNIFICANT CHANGE UP (ref 3.5–5.3)
RBC # BLD: 3.24 M/UL — LOW (ref 4.2–5.8)
RBC # FLD: 12 % — SIGNIFICANT CHANGE UP (ref 10.3–14.5)
SODIUM SERPL-SCNC: 142 MMOL/L — SIGNIFICANT CHANGE UP (ref 135–145)
WBC # BLD: 6.9 K/UL — SIGNIFICANT CHANGE UP (ref 3.8–10.5)
WBC # FLD AUTO: 6.9 K/UL — SIGNIFICANT CHANGE UP (ref 3.8–10.5)

## 2017-08-18 PROCEDURE — 99233 SBSQ HOSP IP/OBS HIGH 50: CPT

## 2017-08-18 RX ORDER — SODIUM CHLORIDE 9 MG/ML
500 INJECTION INTRAMUSCULAR; INTRAVENOUS; SUBCUTANEOUS ONCE
Qty: 0 | Refills: 0 | Status: COMPLETED | OUTPATIENT
Start: 2017-08-18 | End: 2017-08-18

## 2017-08-18 RX ORDER — SODIUM CHLORIDE 9 MG/ML
1000 INJECTION INTRAMUSCULAR; INTRAVENOUS; SUBCUTANEOUS
Qty: 0 | Refills: 0 | Status: DISCONTINUED | OUTPATIENT
Start: 2017-08-18 | End: 2017-08-19

## 2017-08-18 RX ADMIN — Medication 1000 MILLIGRAM(S): at 04:00

## 2017-08-18 RX ADMIN — LORATADINE 10 MILLIGRAM(S): 10 TABLET ORAL at 12:16

## 2017-08-18 RX ADMIN — Medication 100 MILLIGRAM(S): at 06:13

## 2017-08-18 RX ADMIN — SENNA PLUS 2 TABLET(S): 8.6 TABLET ORAL at 22:00

## 2017-08-18 RX ADMIN — OXYCODONE HYDROCHLORIDE 5 MILLIGRAM(S): 5 TABLET ORAL at 10:15

## 2017-08-18 RX ADMIN — APIXABAN 2.5 MILLIGRAM(S): 2.5 TABLET, FILM COATED ORAL at 09:36

## 2017-08-18 RX ADMIN — Medication 25 MILLIGRAM(S): at 06:14

## 2017-08-18 RX ADMIN — Medication 100 MILLIGRAM(S): at 12:16

## 2017-08-18 RX ADMIN — Medication 1000 MILLIGRAM(S): at 18:32

## 2017-08-18 RX ADMIN — HYDROMORPHONE HYDROCHLORIDE 0.5 MILLIGRAM(S): 2 INJECTION INTRAMUSCULAR; INTRAVENOUS; SUBCUTANEOUS at 03:56

## 2017-08-18 RX ADMIN — Medication 100 MILLIGRAM(S): at 22:00

## 2017-08-18 RX ADMIN — Medication 1000 MILLIGRAM(S): at 17:50

## 2017-08-18 RX ADMIN — Medication 4 MILLIGRAM(S): at 06:13

## 2017-08-18 RX ADMIN — HYDROMORPHONE HYDROCHLORIDE 0.5 MILLIGRAM(S): 2 INJECTION INTRAMUSCULAR; INTRAVENOUS; SUBCUTANEOUS at 22:34

## 2017-08-18 RX ADMIN — BICALUTAMIDE 50 MILLIGRAM(S): 50 TABLET, FILM COATED ORAL at 17:50

## 2017-08-18 RX ADMIN — PANTOPRAZOLE SODIUM 40 MILLIGRAM(S): 20 TABLET, DELAYED RELEASE ORAL at 06:14

## 2017-08-18 RX ADMIN — HYDROMORPHONE HYDROCHLORIDE 0.5 MILLIGRAM(S): 2 INJECTION INTRAMUSCULAR; INTRAVENOUS; SUBCUTANEOUS at 04:26

## 2017-08-18 RX ADMIN — Medication 1000 MILLIGRAM(S): at 09:37

## 2017-08-18 RX ADMIN — Medication 400 MILLIGRAM(S): at 03:56

## 2017-08-18 RX ADMIN — OXYCODONE HYDROCHLORIDE 10 MILLIGRAM(S): 5 TABLET ORAL at 13:00

## 2017-08-18 RX ADMIN — SODIUM CHLORIDE 500 MILLILITER(S): 9 INJECTION INTRAMUSCULAR; INTRAVENOUS; SUBCUTANEOUS at 18:32

## 2017-08-18 RX ADMIN — APIXABAN 2.5 MILLIGRAM(S): 2.5 TABLET, FILM COATED ORAL at 21:59

## 2017-08-18 RX ADMIN — OXYCODONE HYDROCHLORIDE 10 MILLIGRAM(S): 5 TABLET ORAL at 12:17

## 2017-08-18 RX ADMIN — Medication 1000 MILLIGRAM(S): at 09:41

## 2017-08-18 RX ADMIN — HYDROMORPHONE HYDROCHLORIDE 0.5 MILLIGRAM(S): 2 INJECTION INTRAMUSCULAR; INTRAVENOUS; SUBCUTANEOUS at 22:04

## 2017-08-18 RX ADMIN — OXYCODONE HYDROCHLORIDE 5 MILLIGRAM(S): 5 TABLET ORAL at 09:38

## 2017-08-18 NOTE — PROGRESS NOTE ADULT - SUBJECTIVE AND OBJECTIVE BOX
Patient is a 84y old  Male who presents with a chief complaint of "left hip"  Left THR (17 Aug 2017 15:05)      INTERVAL HPI/OVERNIGHT EVENTS:    Pain Location & Control:     MEDICATIONS  (STANDING):  pantoprazole    Tablet 40 milliGRAM(s) Oral before breakfast  senna 2 Tablet(s) Oral at bedtime  docusate sodium 100 milliGRAM(s) Oral three times a day  doxazosin 4 milliGRAM(s) Oral daily  loratadine 10 milliGRAM(s) Oral daily  bicalutamide 50 milliGRAM(s) Oral daily  metoprolol succinate ER 25 milliGRAM(s) Oral daily  apixaban 2.5 milliGRAM(s) Oral every 12 hours  acetaminophen   Tablet. 1000 milliGRAM(s) Oral every 8 hours  sodium chloride 0.9%. 1000 milliLiter(s) (60 mL/Hr) IV Continuous <Continuous>    MEDICATIONS  (PRN):  aluminum hydroxide/magnesium hydroxide/simethicone Suspension 30 milliLiter(s) Oral four times a day PRN Indigestion  ondansetron Injectable 4 milliGRAM(s) IV Push every 6 hours PRN Nausea and/or Vomiting  polyethylene glycol 3350 17 Gram(s) Oral daily PRN Constipation  magnesium hydroxide Suspension 30 milliLiter(s) Oral daily PRN Constipation  oxyCODONE    IR 10 milliGRAM(s) Oral every 3 hours PRN Moderate Pain (4 - 6)  oxyCODONE    IR 5 milliGRAM(s) Oral every 3 hours PRN Mild Pain (1 - 3)  HYDROmorphone  Injectable 0.5 milliGRAM(s) IV Push every 3 hours PRN Severe Pain (7 - 10)  benzocaine 15 mG/menthol 3.6 mG Lozenge 1 Lozenge Oral every 4 hours PRN Sore Throat      Allergies    No Known Allergies    Intolerances        REVIEW OF SYSTEMS:  CONSTITUTIONAL: No fever, weight loss, or fatigue  EYES: No eye pain, visual disturbances, or discharge  ENMT:  No difficulty hearing, tinnitus, vertigo; No sinus or throat pain  NECK: No pain or stiffness  BREASTS: No pain, masses, or nipple discharge  RESPIRATORY: No cough, wheezing, chills or hemoptysis; No shortness of breath  CARDIOVASCULAR: No chest pain, palpitations, dizziness, or leg swelling  GASTROINTESTINAL: No abdominal or epigastric pain. No nausea, vomiting, or hematemesis; No diarrhea or constipation. No melena or hematochezia.  GENITOURINARY: No dysuria, frequency, hematuria, or incontinence  NEUROLOGICAL: No headaches, memory loss, loss of strength, numbness, or tremors  SKIN: No itching, burning, rashes, or lesions   LYMPH NODES: No enlarged glands  ENDOCRINE: No heat or cold intolerance; No hair loss; No polydipsia or polyuria  MUSCULOSKELETAL: No back pain  PSYCHIATRIC: No depression, anxiety, mood swings, or difficulty sleeping  HEME/LYMPH: No easy bruising, or bleeding gums  ALLERGY AND IMMUNOLOGIC: No hives or eczema    Vital Signs Last 24 Hrs  T(C): 36.7 (18 Aug 2017 11:21), Max: 36.7 (17 Aug 2017 23:00)  T(F): 98 (18 Aug 2017 11:21), Max: 98 (17 Aug 2017 23:00)  HR: 72 (18 Aug 2017 11:21) (60 - 72)  BP: 97/64 (18 Aug 2017 11:21) (96/64 - 127/61)  BP(mean): --  RR: 18 (18 Aug 2017 11:21) (14 - 18)  SpO2: 96% (18 Aug 2017 11:21) (95% - 100%)    PHYSICAL EXAM:  GENERAL: NAD, well-groomed, well-developed  HEAD:  Atraumatic, Normocephalic  EYES: EOMI, PERRLA, conjunctiva and sclera clear  ENMT: No tonsillar erythema, exudates, or enlargement; Moist mucous membranes, Good dentition, No lesions  NECK: Supple, No JVD, Normal thyroid  NERVOUS SYSTEM:  Alert & Oriented X3, Good concentration; Motor Strength 5/5 B/L upper and lower extremities; DTRs 2+ intact and symmetric  CHEST/LUNG: Clear to auscultation bilaterally; No rales, rhonchi, wheezing, or rubs  HEART: Regular rate and rhythm; No murmurs, rubs, or gallops  ABDOMEN: Soft, Nontender, Nondistended; Bowel sounds present  EXTREMITIES:  2+ Peripheral Pulses, No clubbing or cyanosis  LYMPH: No lymphadenopathy noted  SKIN: No rashes or lesions  INCISION:  Dressing dry and intact    LABS:                        10.3   6.9   )-----------( 145      ( 18 Aug 2017 07:53 )             31.2     18 Aug 2017 07:53    142    |  108    |  25     ----------------------------<  117    4.9     |  24     |  1.38     Ca    8.1        18 Aug 2017 07:53          CAPILLARY BLOOD GLUCOSE          RADIOLOGY & ADDITIONAL TESTS:    Imaging Personally Reviewed:  [ ] YES  [ ] NO    Consultant(s) Notes Reviewed:  [ ] YES  [ ] NO    Care Discussed with Consultants/Other Providers [ ] YES  [ ] NO Patient is a 84y old  Male who presents with a chief complaint of "left hip"  Left THR (17 Aug 2017 15:05)      INTERVAL HPI/OVERNIGHT EVENTS: 85 yo m with hx of OA, hearing loss and prostate cancer, pt feels better, his pain improved. Denies any sob or dizziness.   Pain Location & Control: controlled.     MEDICATIONS  (STANDING):  pantoprazole    Tablet 40 milliGRAM(s) Oral before breakfast  senna 2 Tablet(s) Oral at bedtime  docusate sodium 100 milliGRAM(s) Oral three times a day  doxazosin 4 milliGRAM(s) Oral daily  loratadine 10 milliGRAM(s) Oral daily  bicalutamide 50 milliGRAM(s) Oral daily  metoprolol succinate ER 25 milliGRAM(s) Oral daily  apixaban 2.5 milliGRAM(s) Oral every 12 hours  acetaminophen   Tablet. 1000 milliGRAM(s) Oral every 8 hours  sodium chloride 0.9%. 1000 milliLiter(s) (60 mL/Hr) IV Continuous <Continuous>    MEDICATIONS  (PRN):  aluminum hydroxide/magnesium hydroxide/simethicone Suspension 30 milliLiter(s) Oral four times a day PRN Indigestion  ondansetron Injectable 4 milliGRAM(s) IV Push every 6 hours PRN Nausea and/or Vomiting  polyethylene glycol 3350 17 Gram(s) Oral daily PRN Constipation  magnesium hydroxide Suspension 30 milliLiter(s) Oral daily PRN Constipation  oxyCODONE    IR 10 milliGRAM(s) Oral every 3 hours PRN Moderate Pain (4 - 6)  oxyCODONE    IR 5 milliGRAM(s) Oral every 3 hours PRN Mild Pain (1 - 3)  HYDROmorphone  Injectable 0.5 milliGRAM(s) IV Push every 3 hours PRN Severe Pain (7 - 10)  benzocaine 15 mG/menthol 3.6 mG Lozenge 1 Lozenge Oral every 4 hours PRN Sore Throat      Allergies    No Known Allergies    Intolerances        REVIEW OF SYSTEMS:  CONSTITUTIONAL: No fever, weight loss, or fatigue  EYES: No eye pain, visual disturbances, or discharge  ENMT:  No difficulty hearing, tinnitus, vertigo; No sinus or throat pain  NECK: No pain or stiffness  BREASTS: No pain, masses, or nipple discharge  RESPIRATORY: No cough, wheezing, chills or hemoptysis; No shortness of breath  CARDIOVASCULAR: No chest pain, palpitations, dizziness, or leg swelling  GASTROINTESTINAL: No abdominal or epigastric pain. No nausea, vomiting, or hematemesis; No diarrhea or constipation. No melena or hematochezia.  GENITOURINARY: No dysuria, frequency, hematuria, or incontinence  NEUROLOGICAL: No headaches, memory loss, loss of strength, numbness, or tremors  SKIN: No itching, burning, rashes, or lesions   LYMPH NODES: No enlarged glands  ENDOCRINE: No heat or cold intolerance; No hair loss; No polydipsia or polyuria  MUSCULOSKELETAL: No back pain  PSYCHIATRIC: No depression, anxiety, mood swings, or difficulty sleeping  HEME/LYMPH: No easy bruising, or bleeding gums  ALLERGY AND IMMUNOLOGIC: No hives or eczema    Vital Signs Last 24 Hrs  T(C): 36.7 (18 Aug 2017 11:21), Max: 36.7 (17 Aug 2017 23:00)  T(F): 98 (18 Aug 2017 11:21), Max: 98 (17 Aug 2017 23:00)  HR: 72 (18 Aug 2017 11:21) (60 - 72)  BP: 97/64 (18 Aug 2017 11:21) (96/64 - 127/61)  BP(mean): --  RR: 18 (18 Aug 2017 11:21) (14 - 18)  SpO2: 96% (18 Aug 2017 11:21) (95% - 100%)    PHYSICAL EXAM:  GENERAL: NAD, well-groomed, well-developed  HEAD:  Atraumatic, Normocephalic  EYES: EOMI, PERRLA, conjunctiva and sclera clear  ENMT: No tonsillar erythema, exudates, or enlargement; Moist mucous membranes, Good dentition, No lesions  NECK: Supple, No JVD, Normal thyroid  NERVOUS SYSTEM:  Alert & Oriented X3, Good concentration; Motor Strength 5/5 B/L upper and lower extremities; DTRs 2+ intact and symmetric  CHEST/LUNG: Clear to auscultation bilaterally; No rales, rhonchi, wheezing, or rubs  HEART: Regular rate and rhythm; No murmurs, rubs, or gallops  ABDOMEN: Soft, Nontender, Nondistended; Bowel sounds present  EXTREMITIES:  2+ Peripheral Pulses, No clubbing or cyanosis  LYMPH: No lymphadenopathy noted  SKIN: No rashes or lesions  INCISION:  Dressing dry and intact    LABS:                        10.3   6.9   )-----------( 145      ( 18 Aug 2017 07:53 )             31.2     18 Aug 2017 07:53    142    |  108    |  25     ----------------------------<  117    4.9     |  24     |  1.38     Ca    8.1        18 Aug 2017 07:53          CAPILLARY BLOOD GLUCOSE          RADIOLOGY & ADDITIONAL TESTS:    Imaging Personally Reviewed:  [ ] YES  [ ] NO    Consultant(s) Notes Reviewed:  [x ] YES  [ ] NO    Care Discussed with Consultants/Other Providers [x ] YES  [ ] NO

## 2017-08-18 NOTE — PROGRESS NOTE ADULT - SUBJECTIVE AND OBJECTIVE BOX
ORTHOPEDIC PA PROGRESS NOTE  CRISTINA MCCLELLAN      84y Male                                                                                                                               POD #  1      STATUS POST:               Pre-Op Dx: Primary osteoarthritis of left hip  Osteoarthritis of left hip    Post-Op Dx:  Primary osteoarthritis of left hip    Procedure: Total hip replacement left by Dr. Perry 8/18/17                                                Pain (0-10):  Pt reports 1/10 pain to left hip. Pt denies CP, SOB, fever, chills, numbness/tingling. Pt recently had rodrigues removed and is pending trial void.  Current Pain Management:    [ x] Po Analgesics [x ] IM /IV Anagesics     T(F): 97.6  HR: 68  BP: 120/70  RR: 16  SpO2: 96%               Physical Exam :    -   Dressing C/D/I.  -   Hemovac x 2 intact   -   Distal Neurvascular status intact grossly.   -   Warm well perfused; capillary refill <3 seconds   -   (+)EHL/FHL   -   (+) Sensation to light touch  -   (-) Calf tenderness Bilaterally    A/P: 84y Male s/p Total hip replacement by Dr. Perry 8/18/17     -   Ortho Stable  -   f/u am lab  -   Continue anterior hip precautions  -   Continue PT/OT  -   Continue incentive spirometr  -   Pain control   -   pending trial void  -    hemovac to be d/c tomorrow pod2  -   Medicine to follow  -   DVT ppx:     [x ]SCDs       [ x] Eliquis      -   Weight bearing status:  WBAT [x ]         -  Dispo:     Rehab facility

## 2017-08-19 VITALS
SYSTOLIC BLOOD PRESSURE: 100 MMHG | DIASTOLIC BLOOD PRESSURE: 62 MMHG | RESPIRATION RATE: 16 BRPM | OXYGEN SATURATION: 94 % | TEMPERATURE: 98 F | HEART RATE: 73 BPM

## 2017-08-19 LAB
ANION GAP SERPL CALC-SCNC: 6 MMOL/L — SIGNIFICANT CHANGE UP (ref 5–17)
BUN SERPL-MCNC: 22 MG/DL — SIGNIFICANT CHANGE UP (ref 7–23)
CALCIUM SERPL-MCNC: 8.4 MG/DL — SIGNIFICANT CHANGE UP (ref 8.4–10.5)
CHLORIDE SERPL-SCNC: 110 MMOL/L — HIGH (ref 96–108)
CO2 SERPL-SCNC: 27 MMOL/L — SIGNIFICANT CHANGE UP (ref 22–31)
CREAT SERPL-MCNC: 1.47 MG/DL — HIGH (ref 0.5–1.3)
GLUCOSE SERPL-MCNC: 108 MG/DL — HIGH (ref 70–99)
HCT VFR BLD CALC: 30.2 % — LOW (ref 39–50)
HGB BLD-MCNC: 9.7 G/DL — LOW (ref 13–17)
MCHC RBC-ENTMCNC: 31 PG — SIGNIFICANT CHANGE UP (ref 27–34)
MCHC RBC-ENTMCNC: 32.2 GM/DL — SIGNIFICANT CHANGE UP (ref 32–36)
MCV RBC AUTO: 96.3 FL — SIGNIFICANT CHANGE UP (ref 80–100)
PLATELET # BLD AUTO: 149 K/UL — LOW (ref 150–400)
POTASSIUM SERPL-MCNC: 3.9 MMOL/L — SIGNIFICANT CHANGE UP (ref 3.5–5.3)
POTASSIUM SERPL-SCNC: 3.9 MMOL/L — SIGNIFICANT CHANGE UP (ref 3.5–5.3)
RBC # BLD: 3.14 M/UL — LOW (ref 4.2–5.8)
RBC # FLD: 12 % — SIGNIFICANT CHANGE UP (ref 10.3–14.5)
SODIUM SERPL-SCNC: 143 MMOL/L — SIGNIFICANT CHANGE UP (ref 135–145)
WBC # BLD: 6.6 K/UL — SIGNIFICANT CHANGE UP (ref 3.8–10.5)
WBC # FLD AUTO: 6.6 K/UL — SIGNIFICANT CHANGE UP (ref 3.8–10.5)

## 2017-08-19 PROCEDURE — 99232 SBSQ HOSP IP/OBS MODERATE 35: CPT

## 2017-08-19 RX ORDER — APIXABAN 2.5 MG/1
1 TABLET, FILM COATED ORAL
Qty: 0 | Refills: 0 | COMMUNITY
Start: 2017-08-19

## 2017-08-19 RX ORDER — POLYETHYLENE GLYCOL 3350 17 G/17G
17 POWDER, FOR SOLUTION ORAL
Qty: 0 | Refills: 0 | DISCHARGE
Start: 2017-08-19

## 2017-08-19 RX ORDER — OXYCODONE HYDROCHLORIDE 5 MG/1
1 TABLET ORAL
Qty: 0 | Refills: 0 | COMMUNITY
Start: 2017-08-19

## 2017-08-19 RX ORDER — SENNA PLUS 8.6 MG/1
2 TABLET ORAL
Qty: 0 | Refills: 0 | COMMUNITY
Start: 2017-08-19

## 2017-08-19 RX ORDER — BENZOCAINE AND MENTHOL 5; 1 G/100ML; G/100ML
0 LIQUID ORAL
Qty: 0 | Refills: 0 | COMMUNITY
Start: 2017-08-19

## 2017-08-19 RX ORDER — ACETAMINOPHEN 500 MG
2 TABLET ORAL
Qty: 0 | Refills: 0 | COMMUNITY
Start: 2017-08-19

## 2017-08-19 RX ORDER — DOCUSATE SODIUM 100 MG
1 CAPSULE ORAL
Qty: 0 | Refills: 0 | DISCHARGE
Start: 2017-08-19

## 2017-08-19 RX ORDER — PANTOPRAZOLE SODIUM 20 MG/1
1 TABLET, DELAYED RELEASE ORAL
Qty: 0 | Refills: 0 | DISCHARGE
Start: 2017-08-19

## 2017-08-19 RX ADMIN — Medication 100 MILLIGRAM(S): at 06:19

## 2017-08-19 RX ADMIN — OXYCODONE HYDROCHLORIDE 5 MILLIGRAM(S): 5 TABLET ORAL at 10:00

## 2017-08-19 RX ADMIN — BICALUTAMIDE 50 MILLIGRAM(S): 50 TABLET, FILM COATED ORAL at 13:38

## 2017-08-19 RX ADMIN — Medication 25 MILLIGRAM(S): at 06:20

## 2017-08-19 RX ADMIN — LORATADINE 10 MILLIGRAM(S): 10 TABLET ORAL at 12:55

## 2017-08-19 RX ADMIN — Medication 1000 MILLIGRAM(S): at 09:22

## 2017-08-19 RX ADMIN — Medication 100 MILLIGRAM(S): at 12:55

## 2017-08-19 RX ADMIN — PANTOPRAZOLE SODIUM 40 MILLIGRAM(S): 20 TABLET, DELAYED RELEASE ORAL at 06:20

## 2017-08-19 RX ADMIN — Medication 4 MILLIGRAM(S): at 06:19

## 2017-08-19 RX ADMIN — APIXABAN 2.5 MILLIGRAM(S): 2.5 TABLET, FILM COATED ORAL at 09:22

## 2017-08-19 RX ADMIN — OXYCODONE HYDROCHLORIDE 10 MILLIGRAM(S): 5 TABLET ORAL at 12:59

## 2017-08-19 RX ADMIN — Medication 1000 MILLIGRAM(S): at 09:26

## 2017-08-19 RX ADMIN — OXYCODONE HYDROCHLORIDE 5 MILLIGRAM(S): 5 TABLET ORAL at 09:23

## 2017-08-19 RX ADMIN — OXYCODONE HYDROCHLORIDE 10 MILLIGRAM(S): 5 TABLET ORAL at 13:56

## 2017-08-19 NOTE — PROGRESS NOTE ADULT - PROBLEM SELECTOR PLAN 1
s/p left Total hip replacement.  Continue with pain management, DVT proph, and wound care as per Ortho.  PT/OT
s/p left THR, cont pain control with IV Dilaudid and oxycodone po, , PT OT, DVT ppx with Eliquis

## 2017-08-19 NOTE — PROGRESS NOTE ADULT - PROBLEM SELECTOR PLAN 5
Stable.  no sign of volume overload.  May resume lasix in several day
cont to hold lasix, due to low BP, resume when bp and Cr stable.

## 2017-08-19 NOTE — PROGRESS NOTE ADULT - ASSESSMENT
88 yo m with PMH of OA, hearing loss and prostate  cancer.
88 yo m with PMH of OA, hearing loss CKD, and prostate  cancer.

## 2017-08-19 NOTE — PROGRESS NOTE ADULT - PROBLEM SELECTOR PLAN 8
stable, monitor renal function. avoid any NSAIDS, ACEI, ARBS or any other nephrotoxic agents.
stable, monitor renal function. avoid any NSAIDS, ACEI, ARBS or any other nephrotoxic agents.

## 2017-08-19 NOTE — PROGRESS NOTE ADULT - SUBJECTIVE AND OBJECTIVE BOX
CRISTINA MCCLELLAN                                                                [id number]                                                     Allergies---No Known Allergies        Pt is a 84y year old Male s/p left THR.   Pain is 2/10.   Tolerating the diet.   The patient is A&O x 3, resting comfortably in bed, with no complaints.  Denies any chest pain / shortness of breath / dyspne/ nausea / vomiting / headaches or light headed ness.       Vital Signs Last 24 Hrs  T(F): 97.8 (08-19-17 @ 11:46), Max: 98.4 (08-19-17 @ 07:22)  HR: 73 (08-19-17 @ 11:46)  BP: 100/62 (08-19-17 @ 11:46)  RR: 16 (08-19-17 @ 11:46)  SpO2: 94% (08-19-17 @ 11:46)    I&O's Detail    18 Aug 2017 07:01  -  19 Aug 2017 07:00  --------------------------------------------------------  IN:  Total IN: 0 mL    OUT:    Accordian: 185 mL    Accordian: 10 mL    Indwelling Catheter - Urethral: 600 mL    Voided: 100 mL  Total OUT: 895 mL    Total NET: -895 mL            PE:   Left Lower Extremity:   Dressing is C/D/I.   The dressing was changed with no complications.   The wound is C/D/I.   The wound is closed with sutures.   NO redness, swelling, heat, discharge or any other evidence of infection superficial or deep is noted.   NVI.   Sensation intact to light touch distally.   No gross evidence of motor deficit.   EHL/FHL/TA intact.   +2 DP/PT pulses.   Toes are pink and mobile.   Capillary refill < 2 seconds.   Negative calf tenderness.   No evidence of impending compartment syndrome.   PAS on.                             9.7    6.6   )--------------( 149                          08-19-17 @ 06:50               30.2       143   |  110  |  22  -----------------------------<  108                  08-19-17 @ 06:50  3.9    |  27    |  1.47    Ca    8.4                A:   Pt is a 84y year old Male S/P left total hip replacement, Post Op Day #2        Plan:    - Follow up with Medicine    - OOB with PT/OT   - DVT ppx = PAS +  apixaban 2.5 milliGRAM(s) Oral every 12 hours   - Continue ant hip precautions   - Pain control    - Incentive spirometry   - Labs in A.M.   - Discharge Planning                                                                                                                                                                           Jason Milligan RPA-C

## 2017-08-19 NOTE — PROGRESS NOTE ADULT - SUBJECTIVE AND OBJECTIVE BOX
Pain well controlled.  Offers no other complaints      Constitutional: No fever, fatigue or weight loss.  Skin: No rash.  Eyes: No recent vision problems or eye pain.  ENT: No congestion, ear pain, or sore throat.  Endocrine: No thyroid problems.  Cardiovascular: No chest pain or palpation.  Respiratory: No cough, shortness of breath, congestion, or wheezing.  Gastrointestinal: No abdominal pain, nausea, vomiting, or diarrhea.  Genitourinary: No dysuria.  Musculoskeletal: No joint swelling.  Neurologic: No headache.      MEDICATIONS  (STANDING):  pantoprazole    Tablet 40 milliGRAM(s) Oral before breakfast  senna 2 Tablet(s) Oral at bedtime  docusate sodium 100 milliGRAM(s) Oral three times a day  doxazosin 4 milliGRAM(s) Oral daily  loratadine 10 milliGRAM(s) Oral daily  bicalutamide 50 milliGRAM(s) Oral daily  metoprolol succinate ER 25 milliGRAM(s) Oral daily  apixaban 2.5 milliGRAM(s) Oral every 12 hours  acetaminophen   Tablet. 1000 milliGRAM(s) Oral every 8 hours  sodium chloride 0.9%. 1000 milliLiter(s) (60 mL/Hr) IV Continuous <Continuous>    MEDICATIONS  (PRN):  aluminum hydroxide/magnesium hydroxide/simethicone Suspension 30 milliLiter(s) Oral four times a day PRN Indigestion  ondansetron Injectable 4 milliGRAM(s) IV Push every 6 hours PRN Nausea and/or Vomiting  polyethylene glycol 3350 17 Gram(s) Oral daily PRN Constipation  bisacodyl Suppository 10 milliGRAM(s) Rectal daily PRN If no bowel movement by POD#2  magnesium hydroxide Suspension 30 milliLiter(s) Oral daily PRN Constipation  oxyCODONE    IR 10 milliGRAM(s) Oral every 3 hours PRN Moderate Pain (4 - 6)  oxyCODONE    IR 5 milliGRAM(s) Oral every 3 hours PRN Mild Pain (1 - 3)  HYDROmorphone  Injectable 0.5 milliGRAM(s) IV Push every 3 hours PRN Severe Pain (7 - 10)  benzocaine 15 mG/menthol 3.6 mG Lozenge 1 Lozenge Oral every 4 hours PRN Sore Throat      Vital Signs Last 24 Hrs  T(C): 36.9 (19 Aug 2017 07:22), Max: 36.9 (18 Aug 2017 15:10)  T(F): 98.4 (19 Aug 2017 07:22), Max: 98.4 (18 Aug 2017 15:10)  HR: 68 (19 Aug 2017 07:22) (68 - 79)  BP: 113/65 (19 Aug 2017 07:22) (97/64 - 120/67)  BP(mean): --  RR: 15 (19 Aug 2017 07:22) (15 - 18)  SpO2: 94% (19 Aug 2017 07:22) (93% - 97%)      PHYSICAL EXAM-  GENERAL: NAD, well-groomed, well-developed  HEAD:  Atraumatic, Normocephalic  EYES: EOMI, PERRLA, conjunctiva and sclera clear  NECK: Supple, No JVD, Normal thyroid  NERVOUS SYSTEM:  Alert & Oriented X3, Motor Strength 5/5 B/L upper and lower extremities; DTRs 2+ intact and symmetric  CHEST/LUNG: Clear to percussion bilaterally; No rales, rhonchi, wheezing, or rubs  HEART: Regular rate and rhythm; No murmurs, rubs, or gallops  ABDOMEN: Soft, Nontender, Nondistended; Bowel sounds present  EXTREMITIES:  2+ Peripheral Pulses, No clubbing, cyanosis, or edema  SKIN: No rashes or lesions                            9.7    6.6   )-----------( 149      ( 19 Aug 2017 06:50 )             30.2     08-19    143  |  110<H>  |  22  ----------------------------<  108<H>  3.9   |  27  |  1.47<H>    Ca    8.4      19 Aug 2017 06:50

## 2017-08-19 NOTE — PROGRESS NOTE ADULT - ATTENDING COMMENTS
I notified DR Waylon Whitlock pcp, left massage on his cell phone, per DR Olson request.
I notified DR Waylon Whitlock pcp, left massage on his cell phone, per DR Olson request.

## 2017-08-19 NOTE — PROGRESS NOTE ADULT - PROBLEM SELECTOR PROBLEM 8
CKD (chronic kidney disease) stage 3, GFR 30-59 ml/min
CKD (chronic kidney disease) stage 3, GFR 30-59 ml/min

## 2017-08-19 NOTE — PROGRESS NOTE ADULT - NSHPATTENDINGPLANDISCUSS_GEN_ALL_CORE
pt, ortho pa, SW about plan of care and discharge plan.
pt, ortho pa, SW about plan of care and discharge plan.

## 2017-08-21 LAB — SURGICAL PATHOLOGY FINAL REPORT - CH: SIGNIFICANT CHANGE UP

## 2017-09-02 ENCOUNTER — INPATIENT (INPATIENT)
Facility: HOSPITAL | Age: 82
LOS: 5 days | Discharge: NURSING, REHAB OR LONG-TERM CARE | DRG: 464 | End: 2017-09-08
Attending: INTERNAL MEDICINE | Admitting: INTERNAL MEDICINE
Payer: MEDICARE

## 2017-09-02 VITALS
HEIGHT: 64 IN | DIASTOLIC BLOOD PRESSURE: 76 MMHG | SYSTOLIC BLOOD PRESSURE: 134 MMHG | WEIGHT: 207.01 LBS | HEART RATE: 83 BPM | OXYGEN SATURATION: 98 % | RESPIRATION RATE: 18 BRPM | TEMPERATURE: 98 F

## 2017-09-02 DIAGNOSIS — J30.2 OTHER SEASONAL ALLERGIC RHINITIS: ICD-10-CM

## 2017-09-02 DIAGNOSIS — T81.4XXA INFECTION FOLLOWING A PROCEDURE, INITIAL ENCOUNTER: ICD-10-CM

## 2017-09-02 DIAGNOSIS — H91.8X9 OTHER SPECIFIED HEARING LOSS, UNSPECIFIED EAR: ICD-10-CM

## 2017-09-02 DIAGNOSIS — Z96.642 PRESENCE OF LEFT ARTIFICIAL HIP JOINT: Chronic | ICD-10-CM

## 2017-09-02 DIAGNOSIS — Z90.49 ACQUIRED ABSENCE OF OTHER SPECIFIED PARTS OF DIGESTIVE TRACT: Chronic | ICD-10-CM

## 2017-09-02 DIAGNOSIS — R60.0 LOCALIZED EDEMA: ICD-10-CM

## 2017-09-02 DIAGNOSIS — M97.02XA PERIPROSTHETIC FRACTURE AROUND INTERNAL PROSTHETIC LEFT HIP JOINT, INITIAL ENCOUNTER: ICD-10-CM

## 2017-09-02 DIAGNOSIS — Z98.890 OTHER SPECIFIED POSTPROCEDURAL STATES: Chronic | ICD-10-CM

## 2017-09-02 DIAGNOSIS — M16.0 BILATERAL PRIMARY OSTEOARTHRITIS OF HIP: ICD-10-CM

## 2017-09-02 LAB
ALBUMIN SERPL ELPH-MCNC: 2.8 G/DL — LOW (ref 3.3–5)
ALP SERPL-CCNC: 154 U/L — HIGH (ref 30–120)
ALT FLD-CCNC: 12 U/L DA — SIGNIFICANT CHANGE UP (ref 10–60)
ANION GAP SERPL CALC-SCNC: 7 MMOL/L — SIGNIFICANT CHANGE UP (ref 5–17)
APPEARANCE UR: CLEAR — SIGNIFICANT CHANGE UP
APTT BLD: 29.2 SEC — SIGNIFICANT CHANGE UP (ref 27.5–37.4)
AST SERPL-CCNC: 21 U/L — SIGNIFICANT CHANGE UP (ref 10–40)
BASOPHILS # BLD AUTO: 0 K/UL — SIGNIFICANT CHANGE UP (ref 0–0.2)
BASOPHILS NFR BLD AUTO: 0.7 % — SIGNIFICANT CHANGE UP (ref 0–2)
BILIRUB SERPL-MCNC: 0.3 MG/DL — SIGNIFICANT CHANGE UP (ref 0.2–1.2)
BILIRUB UR-MCNC: NEGATIVE — SIGNIFICANT CHANGE UP
BUN SERPL-MCNC: 26 MG/DL — HIGH (ref 7–23)
CALCIUM SERPL-MCNC: 9 MG/DL — SIGNIFICANT CHANGE UP (ref 8.4–10.5)
CHLORIDE SERPL-SCNC: 104 MMOL/L — SIGNIFICANT CHANGE UP (ref 96–108)
CO2 SERPL-SCNC: 31 MMOL/L — SIGNIFICANT CHANGE UP (ref 22–31)
COLOR SPEC: YELLOW — SIGNIFICANT CHANGE UP
CREAT SERPL-MCNC: 1.54 MG/DL — HIGH (ref 0.5–1.3)
CRP SERPL-MCNC: 5.7 MG/DL — HIGH (ref 0–0.4)
DIFF PNL FLD: ABNORMAL
EOSINOPHIL # BLD AUTO: 0.1 K/UL — SIGNIFICANT CHANGE UP (ref 0–0.5)
EOSINOPHIL NFR BLD AUTO: 1.2 % — SIGNIFICANT CHANGE UP (ref 0–6)
ERYTHROCYTE [SEDIMENTATION RATE] IN BLOOD: 99 MM/HR — HIGH (ref 0–9)
GLUCOSE SERPL-MCNC: 109 MG/DL — HIGH (ref 70–99)
GLUCOSE UR QL: NEGATIVE MG/DL — SIGNIFICANT CHANGE UP
HCT VFR BLD CALC: 33 % — LOW (ref 39–50)
HGB BLD-MCNC: 10.4 G/DL — LOW (ref 13–17)
INR BLD: 1.26 RATIO — HIGH (ref 0.88–1.16)
KETONES UR-MCNC: NEGATIVE — SIGNIFICANT CHANGE UP
LACTATE SERPL-SCNC: 0.9 MMOL/L — SIGNIFICANT CHANGE UP (ref 0.7–2)
LEUKOCYTE ESTERASE UR-ACNC: ABNORMAL
LYMPHOCYTES # BLD AUTO: 0.7 K/UL — LOW (ref 1–3.3)
LYMPHOCYTES # BLD AUTO: 9.1 % — LOW (ref 13–44)
MCHC RBC-ENTMCNC: 29.8 PG — SIGNIFICANT CHANGE UP (ref 27–34)
MCHC RBC-ENTMCNC: 31.4 GM/DL — LOW (ref 32–36)
MCV RBC AUTO: 94.8 FL — SIGNIFICANT CHANGE UP (ref 80–100)
MONOCYTES # BLD AUTO: 0.3 K/UL — SIGNIFICANT CHANGE UP (ref 0–0.9)
MONOCYTES NFR BLD AUTO: 4.5 % — SIGNIFICANT CHANGE UP (ref 2–14)
NEUTROPHILS # BLD AUTO: 6.1 K/UL — SIGNIFICANT CHANGE UP (ref 1.8–7.4)
NEUTROPHILS NFR BLD AUTO: 84.5 % — HIGH (ref 43–77)
NITRITE UR-MCNC: NEGATIVE — SIGNIFICANT CHANGE UP
PH UR: 5 — SIGNIFICANT CHANGE UP (ref 5–8)
PLATELET # BLD AUTO: 324 K/UL — SIGNIFICANT CHANGE UP (ref 150–400)
POTASSIUM SERPL-MCNC: 3.5 MMOL/L — SIGNIFICANT CHANGE UP (ref 3.5–5.3)
POTASSIUM SERPL-SCNC: 3.5 MMOL/L — SIGNIFICANT CHANGE UP (ref 3.5–5.3)
PROT SERPL-MCNC: 6.7 G/DL — SIGNIFICANT CHANGE UP (ref 6–8.3)
PROT UR-MCNC: NEGATIVE MG/DL — SIGNIFICANT CHANGE UP
PROTHROM AB SERPL-ACNC: 13.8 SEC — HIGH (ref 9.8–12.7)
RBC # BLD: 3.48 M/UL — LOW (ref 4.2–5.8)
RBC # FLD: 12 % — SIGNIFICANT CHANGE UP (ref 10.3–14.5)
RBC CASTS # UR COMP ASSIST: ABNORMAL /HPF (ref 0–4)
SODIUM SERPL-SCNC: 142 MMOL/L — SIGNIFICANT CHANGE UP (ref 135–145)
SP GR SPEC: 1.01 — SIGNIFICANT CHANGE UP (ref 1.01–1.02)
UROBILINOGEN FLD QL: NEGATIVE MG/DL — SIGNIFICANT CHANGE UP
WBC # BLD: 7.2 K/UL — SIGNIFICANT CHANGE UP (ref 3.8–10.5)
WBC # FLD AUTO: 7.2 K/UL — SIGNIFICANT CHANGE UP (ref 3.8–10.5)
WBC UR QL: SIGNIFICANT CHANGE UP

## 2017-09-02 PROCEDURE — 93010 ELECTROCARDIOGRAM REPORT: CPT

## 2017-09-02 PROCEDURE — 99223 1ST HOSP IP/OBS HIGH 75: CPT | Mod: AI

## 2017-09-02 PROCEDURE — 71010: CPT | Mod: 26

## 2017-09-02 PROCEDURE — 73501 X-RAY EXAM HIP UNI 1 VIEW: CPT | Mod: 26,LT

## 2017-09-02 PROCEDURE — 99285 EMERGENCY DEPT VISIT HI MDM: CPT

## 2017-09-02 RX ORDER — METOPROLOL TARTRATE 50 MG
25 TABLET ORAL DAILY
Qty: 0 | Refills: 0 | Status: DISCONTINUED | OUTPATIENT
Start: 2017-09-02 | End: 2017-09-04

## 2017-09-02 RX ORDER — FLUTICASONE PROPIONATE 220 MCG
1 AEROSOL WITH ADAPTER (GRAM) INHALATION DAILY
Qty: 0 | Refills: 0 | Status: DISCONTINUED | OUTPATIENT
Start: 2017-09-02 | End: 2017-09-04

## 2017-09-02 RX ORDER — CEFTRIAXONE 500 MG/1
2 INJECTION, POWDER, FOR SOLUTION INTRAMUSCULAR; INTRAVENOUS ONCE
Qty: 0 | Refills: 0 | Status: COMPLETED | OUTPATIENT
Start: 2017-09-02 | End: 2017-09-02

## 2017-09-02 RX ORDER — INFLUENZA VIRUS VACCINE 15; 15; 15; 15 UG/.5ML; UG/.5ML; UG/.5ML; UG/.5ML
0.5 SUSPENSION INTRAMUSCULAR ONCE
Qty: 0 | Refills: 0 | Status: COMPLETED | OUTPATIENT
Start: 2017-09-02 | End: 2017-09-08

## 2017-09-02 RX ORDER — VANCOMYCIN HCL 1 G
1500 VIAL (EA) INTRAVENOUS ONCE
Qty: 0 | Refills: 0 | Status: COMPLETED | OUTPATIENT
Start: 2017-09-02 | End: 2017-09-02

## 2017-09-02 RX ORDER — FUROSEMIDE 40 MG
40 TABLET ORAL DAILY
Qty: 0 | Refills: 0 | Status: DISCONTINUED | OUTPATIENT
Start: 2017-09-02 | End: 2017-09-04

## 2017-09-02 RX ORDER — HYDROMORPHONE HYDROCHLORIDE 2 MG/ML
0.5 INJECTION INTRAMUSCULAR; INTRAVENOUS; SUBCUTANEOUS
Qty: 0 | Refills: 0 | Status: DISCONTINUED | OUTPATIENT
Start: 2017-09-02 | End: 2017-09-04

## 2017-09-02 RX ORDER — DOXAZOSIN MESYLATE 4 MG
4 TABLET ORAL AT BEDTIME
Qty: 0 | Refills: 0 | Status: DISCONTINUED | OUTPATIENT
Start: 2017-09-02 | End: 2017-09-04

## 2017-09-02 RX ORDER — OXYCODONE HYDROCHLORIDE 5 MG/1
10 TABLET ORAL
Qty: 0 | Refills: 0 | Status: DISCONTINUED | OUTPATIENT
Start: 2017-09-02 | End: 2017-09-04

## 2017-09-02 RX ORDER — VANCOMYCIN HCL 1 G
1000 VIAL (EA) INTRAVENOUS EVERY 24 HOURS
Qty: 0 | Refills: 0 | Status: DISCONTINUED | OUTPATIENT
Start: 2017-09-02 | End: 2017-09-04

## 2017-09-02 RX ORDER — PANTOPRAZOLE SODIUM 20 MG/1
40 TABLET, DELAYED RELEASE ORAL
Qty: 0 | Refills: 0 | Status: DISCONTINUED | OUTPATIENT
Start: 2017-09-02 | End: 2017-09-04

## 2017-09-02 RX ORDER — LORATADINE 10 MG/1
10 TABLET ORAL DAILY
Qty: 0 | Refills: 0 | Status: DISCONTINUED | OUTPATIENT
Start: 2017-09-02 | End: 2017-09-04

## 2017-09-02 RX ORDER — DOCUSATE SODIUM 100 MG
100 CAPSULE ORAL THREE TIMES A DAY
Qty: 0 | Refills: 0 | Status: DISCONTINUED | OUTPATIENT
Start: 2017-09-02 | End: 2017-09-04

## 2017-09-02 RX ORDER — ASCORBIC ACID 60 MG
500 TABLET,CHEWABLE ORAL DAILY
Qty: 0 | Refills: 0 | Status: DISCONTINUED | OUTPATIENT
Start: 2017-09-02 | End: 2017-09-04

## 2017-09-02 RX ORDER — BICALUTAMIDE 50 MG/1
50 TABLET, FILM COATED ORAL DAILY
Qty: 0 | Refills: 0 | Status: DISCONTINUED | OUTPATIENT
Start: 2017-09-02 | End: 2017-09-04

## 2017-09-02 RX ORDER — OXYCODONE HYDROCHLORIDE 5 MG/1
5 TABLET ORAL
Qty: 0 | Refills: 0 | Status: DISCONTINUED | OUTPATIENT
Start: 2017-09-02 | End: 2017-09-04

## 2017-09-02 RX ADMIN — OXYCODONE HYDROCHLORIDE 5 MILLIGRAM(S): 5 TABLET ORAL at 12:42

## 2017-09-02 RX ADMIN — Medication 100 MILLIGRAM(S): at 21:30

## 2017-09-02 RX ADMIN — Medication 40 MILLIGRAM(S): at 17:37

## 2017-09-02 RX ADMIN — LORATADINE 10 MILLIGRAM(S): 10 TABLET ORAL at 17:38

## 2017-09-02 RX ADMIN — Medication 250 MILLIGRAM(S): at 10:11

## 2017-09-02 RX ADMIN — PANTOPRAZOLE SODIUM 40 MILLIGRAM(S): 20 TABLET, DELAYED RELEASE ORAL at 17:38

## 2017-09-02 RX ADMIN — Medication 500 MILLIGRAM(S): at 17:35

## 2017-09-02 RX ADMIN — BICALUTAMIDE 50 MILLIGRAM(S): 50 TABLET, FILM COATED ORAL at 17:36

## 2017-09-02 RX ADMIN — Medication 25 MILLIGRAM(S): at 17:38

## 2017-09-02 RX ADMIN — Medication 1 PUFF(S): at 17:37

## 2017-09-02 RX ADMIN — Medication 4 MILLIGRAM(S): at 21:30

## 2017-09-02 RX ADMIN — CEFTRIAXONE 100 GRAM(S): 500 INJECTION, POWDER, FOR SOLUTION INTRAMUSCULAR; INTRAVENOUS at 08:07

## 2017-09-02 NOTE — H&P ADULT - ASSESSMENT
85 y/o male with pmh of OA, seasonal  rhinitis, Ca Prostate, left anterior THR on aug17thand discharged to rehab  is admitted for infected surgical wound and periprosthetic cellulitis. 83 y/o male with pmh of OA, seasonal  rhinitis, Ca Prostate, left anterior THR on aug17  is admitted for infected surgical wound and periprosthetic fracture.

## 2017-09-02 NOTE — ED PROVIDER NOTE - FAMILY HISTORY
Father  Still living? Unknown  Family history of coronary artery disease, Age at diagnosis: Age Unknown     Mother  Still living? Unknown  Family history of rheumatic heart disease, Age at diagnosis: Age Unknown

## 2017-09-02 NOTE — ED PROVIDER NOTE - OBJECTIVE STATEMENT
Patient transfer from Rehab center apparently been having increasing left hip pain seen by PMD last night advice transfer to hospital for further evaluation.    This patient was admitted to [facility] with a history of severe degenerative joint disease of the Left hip 8/17/17.  Patient went to Pre-Surgical Testing at [facility] and was medically cleared to undergo elective procedure.  Left THR performed on 8/17/17 by . No operative or divina-operative complications arose during patients hospital course.  Patient received antibiotic according to SCIP guidelines for infection prevention.

## 2017-09-02 NOTE — PROGRESS NOTE ADULT - SUBJECTIVE AND OBJECTIVE BOX
84 year old male  left anterior total hip replacement aug 17th  complaining of left thigh pain and redness of incision  at rehabilitation center on antibiotics for incision erythema  reported increasing erythema by  last night  advised transfer to emergency  exam no distress  afebrile  left hip; incision erythema proximally and superficial necrosis and fluctuance  sutures intact  leg length equal   wbc 7.2  neutrophils;84.599%  ESR;99  Xray; left hip; minimally displaced fracture medial proximal femur with stem subsidence

## 2017-09-02 NOTE — ED ADULT TRIAGE NOTE - CHIEF COMPLAINT QUOTE
left hip pain. s/p left hip replacement 2 weeks ago. Dr. Perry told PT to come to [facility].

## 2017-09-02 NOTE — ED ADULT NURSE NOTE - OBJECTIVE STATEMENT
85 yearold male BIBA from [facility] with CC of severe lefdt hip pain pver the past 4 days  woith increased pain with movement   opertive site red,hot to touch with yellow exudate noted at site

## 2017-09-02 NOTE — PROGRESS NOTE ADULT - ASSESSMENT
impression; wound infection left hip  periprosthetic fracture left hip femur  plan; transfer to   incision debridement and 2 stage revision   Parenteral antibiotics

## 2017-09-02 NOTE — ED PROVIDER NOTE - MEDICAL DECISION MAKING DETAILS
evaluate the severity of infection of the surgical wound corollate with lab test and clinical finding and treat accordingly.

## 2017-09-02 NOTE — PATIENT PROFILE ADULT. - NS PRO TALK SOMEONE YN
Called [facility] to check on bed status.  They do not have any med surg beds available and pt may need to be held here.  Dr Lyles updated.  Asked if pt can eat.  Dr Lyles ok with that.  Pt's s/o went to get food.     Karrie Quintero RN  08/14/17 0018     no

## 2017-09-02 NOTE — ED ADULT NURSE NOTE - PSH
History of appendectomy    History of hip replacement, total, left    S/P cholecystectomy  1985  S/P inguinal hernia repair  1961

## 2017-09-02 NOTE — CONSULT NOTE ADULT - SUBJECTIVE AND OBJECTIVE BOX
HPI:  83 y/o male with pmh of OA, seasonal  rhinitis, Ca Prostate sp left total hip replacement   on Aug 17th 2017 sent to rehab. Pt was complaining of left thigh pain Then had redness   at surgical site States had pain when ambulating. Was given antibiotics but symptoms   and redness was worsening. Sent to ER. Noted to have left hip incision erythema and   superficial necrosis. Afebrile. Xray showed left hip; minimally displaced peripristhetic fx.  Evaluated by ortho in ED and recommended I&D and revision of joint    Infectious Disease consult was called to evaluate pt and for antibiotic management.      Past Medical & Surgical Hx:  PAST MEDICAL & SURGICAL HISTORY:  Hearing loss  Osteoarthritis  Fluid retention in legs  Seasonal allergies  Prostate cancer: 48 treatments of RT  History of hip replacement, total, left  S/P inguinal hernia repair:   S/P cholecystectomy:   History of appendectomy    Social History--  EtOH: denies   Tobacco: denies  Drug Use: denies    Travel/Environmental/Occupational History:  NONE    FAMILY HISTORY:  Family history of coronary artery disease (Father)  Family history of rheumatic heart disease (Mother)      No Known Allergies      Home Medications:   * Incomplete Medication History as of 02-Sep-2017 07:18 documented in Prescription Writer  · 	benzocaine-menthol 15 mg-3.6 mg mucous membrane lozenge:  mucous membrane   · 	pantoprazole 40 mg oral delayed release tablet: 1 tab(s) orally once a day (before a meal)  · 	oxyCODONE 10 mg oral tablet: 1 tab(s) orally every 3 hours, As needed, Moderate Pain (4 - 6)  · 	oxyCODONE 5 mg oral tablet: 1 tab(s) orally every 3 hours, As needed, Mild Pain (1 - 3)  · 	acetaminophen 500 mg oral tablet: 2 tab(s) orally every 8 hours  · 	apixaban 2.5 mg oral tablet: 1 tab(s) orally every 12 hours  · 	senna oral tablet: 2 tab(s) orally once a day (at bedtime)  · 	bisacodyl 10 mg rectal suppository: 1 suppository(ies) rectal once a day, As needed, If no bowel movement by POD#2  · 	docusate sodium 100 mg oral capsule: 1 cap(s) orally 3 times a day, Last Dose Taken:    · 	polyethylene glycol 3350 oral powder for reconstitution: 17 gram(s) orally once a day, As needed, Constipation  · 	metoprolol succinate 25 mg oral tablet, extended release: 1 tab(s) orally once a day, Last Dose Taken:    · 	doxazosin 4 mg oral tablet: 1 tab(s) orally once a day, Last Dose Taken:    · 	furosemide 40 mg oral tablet: 1 tab(s) orally once a day, Last Dose Taken:    · 	bicalutamide 50 mg oral tablet: 1 tab(s) orally every 24 hours  · 	fluticasone propionate:   , Last Dose Taken:    · 	loratadine 10 mg oral tablet: 1 tab(s) orally once a day, Last Dose Taken:    · 	Centrum Silver oral tablet: 1 tab(s) orally once a day  · 	Vitamin C 500 mg oral tablet: 1 tab(s) orally once a day  · 	Lupron Depot 22.5 mg/3 months intramuscular injection, extended release:  intramuscular , Last Dose Taken:    · 	Milk of Magnesia:  orally   	  	400mg in 5ml po prn, Last Dose Taken:    Current Inpatient Medications :    ANTIBIOTICS:   vancomycin  IVPB 1000 milliGRAM(s) IV Intermittent every 24 hours      OTHER RELEVANT MEDICATIONS :  HYDROmorphone  Injectable 0.5 milliGRAM(s) IV Push every 3 hours PRN  oxyCODONE    IR 5 milliGRAM(s) Oral every 3 hours PRN  oxyCODONE    IR 10 milliGRAM(s) Oral every 3 hours PRN  doxazosin 4 milliGRAM(s) Oral at bedtime  loratadine 10 milliGRAM(s) Oral daily  metoprolol succinate ER 25 milliGRAM(s) Oral daily  furosemide    Tablet 40 milliGRAM(s) Oral daily  bisacodyl Suppository 10 milliGRAM(s) Rectal daily PRN  docusate sodium 100 milliGRAM(s) Oral three times a day  pantoprazole    Tablet 40 milliGRAM(s) Oral before breakfast  ascorbic acid 500 milliGRAM(s) Oral daily  influenza   Vaccine 0.5 milliLiter(s) IntraMuscular once  fluticasone propionate   44 MICROgram(s) HFA Inhaler 1 Puff(s) Inhalation daily      ROS:  CONSTITUTIONAL:  Negative fever or chills   EYES:  Negative  blurry vision or double vision  CARDIOVASCULAR:  Negative for chest pain or palpitations  RESPIRATORY:  Negative for cough, wheezing, or SOB   GASTROINTESTINAL:  Negative for nausea, vomiting, diarrhea, constipation, or abdominal pain  MUSCULOSKELETAL: +Left hip pain  GENITOURINARY:  Negative frequency, urgency , dysuria or hematuria   NEUROLOGIC:  No headache, confusion, dizziness, lightheadedness  All other systems were reviewed and are negative          I&O's Detail    02 Sep 2017 07:01  -  03 Sep 2017 00:18  --------------------------------------------------------  IN:  Total IN: 0 mL    OUT:    Voided: 600 mL  Total OUT: 600 mL    Total NET: -600 mL          Physical Exam:  Vital Signs Last 24 Hrs  T(C): 36.9 (02 Sep 2017 21:39), Max: 37.1 (02 Sep 2017 12:45)  T(F): 98.4 (02 Sep 2017 21:39), Max: 98.7 (02 Sep 2017 12:45)  HR: 74 (02 Sep 2017 21:39) (74 - 84)  BP: 103/60 (02 Sep 2017 21:39) (103/60 - 151/67)  BP(mean): --  RR: 16 (02 Sep 2017 21:39) (16 - 21)  SpO2: 95% (02 Sep 2017 21:39) (92% - 98%)  Height (cm): 162.5 ( @ 12:45)  Weight (kg): 93.9 ( @ 06:57)  BMI (kg/m2): 35.6 ( @ 12:45)  BSA (m2): 1.98 ( @ 12:45)    General: well developed well nourished, in no acute distress  Eyes: sclera anicteric, pupils equal and reactive to light  ENMT: buccal mucosa moist, pharynx not injected  Neck: supple, trachea midline  Lungs: clear, no wheeze/rhonchi  Cardiovascular: regular rate and rhythm, S1 S2  Abdomen: soft, nontender, no organomegaly present, bowel sounds normal  Neurological:  alert and oriented x3, Cranial Nerves II-XII grossly intact  Extremities: Left hip erythema with superficial necrosis Some purulence    Labs:         142  |  104  |  26<H>  ----------------------------<  109<H>  3.5   |  31  |  1.54<H>    Ca    9.0      02 Sep 2017 08:03    TPro  6.7  /  Alb  2.8<L>  /  TBili  0.3  /  DBili  x   /  AST  21  /  ALT  12  /  AlkPhos  154<H>                            10.4   7.2   )-----------( 324      ( 02 Sep 2017 08:03 )             33.0       PT/INR - ( 02 Sep 2017 08:03 )   PT: 13.8 sec;   INR: 1.26 ratio         PTT - ( 02 Sep 2017 08:03 )  PTT:29.2 sec  Urinalysis Basic - ( 02 Sep 2017 08:03 )    Color: Yellow / Appearance: Clear / S.015 / pH: x  Gluc: x / Ketone: Negative  / Bili: Negative / Urobili: Negative mg/dL   Blood: x / Protein: Negative mg/dL / Nitrite: Negative   Leuk Esterase: Small / RBC: 6-10 /HPF / WBC 0-2   Sq Epi: x / Non Sq Epi: x / Bacteria: x      LIVER FUNCTIONS - ( 02 Sep 2017 08:03 )  Alb: 2.8 g/dL / Pro: 6.7 g/dL / ALK PHOS: 154 U/L / ALT: 12 U/L DA / AST: 21 U/L / GGT: x           Sedimentation Rate, Erythrocyte (17 @ 08:03)    Sedimentation Rate, Erythrocyte: 99 mm/Hr        RECENT CULTURES:  PENDING    RADIOLOGY & ADDITIONAL STUDIES:  EXAM:  XR HIP WITH PELV 1V LT                            PROCEDURE DATE:  2017          INTERPRETATION:  Single frontal view the pelvis and 2 views of the left   hip    Clinical indication: Left hip soreness, evaluate for osteomyelitis    FINDINGS: The bones are osteopenic. The patient is status post total left   hip replacement. There is a minimally displaced periprosthetic fracture   along the medial aspect of the proximal left femoral shaft. There are   marked degenerative changes of the right hip with obliteration of the   joint space and degenerative changes of the lower lumbar spine. Calcified   bladder stones are noted. Surgical clips overlie the pubic symphysis and   left greater trochanter.    IMPRESSION: Minimally displaced periprosthetic fracture along the medial   aspect of the proximal left femoral shaft.      Additional findings as above.    Findings were discussed with Dr. Anders of the ED at the time of this   dictation.      Assessment :   84 year old male sp left hip ORIF on 17 admitted for Left hip surgical wound infection  with concerns for deep infection involving prosthetic joint  Seen by ortho  ESR 99  CKD      Plan :   For I&D and poss joint revision vs explantation and placement of cement spacer  Fu cultures  Cont Vancomycin for now pending cultures  Follow Vancomycin trough closely  Monitor renal fx closely while on Vancomycin  Ortho following    Continue with present regime .  Approptiate use of antibiotics and adverse effects reviewed.      I have discussed the above plan of care with patient in detail. He expressed understanding of the treatment plan . Risks, benefits and alternatives discussed in detail.   I have asked if he has any questions or concerns and appropriately addressed them to the best of my ability .      > 45 minutes spent in direct patient care reviewing  the notes, lab data/ imaging , discussion with multidisciplinary team. All questions were addressed and answered to the best of my capacity .    Thank you for allowing me to participate in the care of your patient .      Zurdo Braden MD  Infectious Disease  [phone]

## 2017-09-02 NOTE — H&P ADULT - PROBLEM SELECTOR PLAN 1
c/w IV antibiotics as per ID.   surgical plan as per ortho.  f/u culture report.  ID consult-.  Ortho  (covering for ).

## 2017-09-02 NOTE — PROGRESS NOTE ADULT - SUBJECTIVE AND OBJECTIVE BOX
Patient has been on Eliquis s/p Left THR.  Anticoagulation being held at this point in anticipation of Revision Left THR, I & D, probably on Monday 9/4/17.  Dr. Titus to see patient later today.  Definitive management pending Dr. Titus's evaluation and further discussion with patient.

## 2017-09-02 NOTE — H&P ADULT - HISTORY OF PRESENT ILLNESS
83 y/o male with pmh of OA, seasonal  rhinitis, Ca Prostate, left anterior THR on aug17th is admitted with c/o pain in laft hip, +redness around surgical site for few days. Pt was started on po antibiotics with no improvement of symptoms so admitted for further evaluation. Xray revealed periprosthetic fracture.  pt is evaluated by ortho in ED, needs debridement and revision of join,. Pt wants to think for surgery. ( is covering ). 83 y/o male with pmh of OA, seasonal  rhinitis, Ca Prostate, left anterior THR on aug17thand discharged to rehab  is admitted with c/o pain in left hip, +redness around surgical site for few days. Pt was started on po antibiotics with no improvement of symptoms so admitted for further evaluation. Xray revealed periprosthetic fracture.  pt is evaluated by ortho in ED, needs debridement and revision of join,. Pt wants to think for surgery. ( is covering ).on Eliqis for DVT prophylaxis. 83 y/o male with pmh of OA, seasonal  rhinitis, Ca Prostate, left anterior THR on [id number] discharged to rehab  is admitted with c/o pain in left hip, +redness around surgical site for few days. Pt was started on po antibiotics with no improvement of symptoms so admitted for further evaluation. Xray revealed periprosthetic fracture.  pt is evaluated by ortho in ED, needs debridement and revision of join,. Pt wants to think for surgery. ( is covering ).was on Eliqis for DVT prophylaxis, last dose is taken yesterday. 83 y/o male with pmh of OA, seasonal  rhinitis, Ca Prostate, left anterior THR on aug17th  is admitted with c/o pain in left hip, +redness around surgical site for few days. Pt was started on po antibiotics with no improvement of symptoms so admitted for further evaluation. Xray revealed periprosthetic fracture.  pt is evaluated by ortho in ED, needs debridement and revision of join,. Pt wants to think for surgery. ( is covering ).was on Eliqis for DVT prophylaxis, last dose is taken yesterday.

## 2017-09-02 NOTE — ED ADULT NURSE NOTE - CHIEF COMPLAINT QUOTE
left hip pain. s/p left hip replacement 2 weeks ago. Dr. Perry told PT to come to [facility] [address].

## 2017-09-03 DIAGNOSIS — N17.9 ACUTE KIDNEY FAILURE, UNSPECIFIED: ICD-10-CM

## 2017-09-03 DIAGNOSIS — N18.3 CHRONIC KIDNEY DISEASE, STAGE 3 (MODERATE): ICD-10-CM

## 2017-09-03 LAB
ANION GAP SERPL CALC-SCNC: 8 MMOL/L — SIGNIFICANT CHANGE UP (ref 5–17)
BUN SERPL-MCNC: 21 MG/DL — SIGNIFICANT CHANGE UP (ref 7–23)
CALCIUM SERPL-MCNC: 9 MG/DL — SIGNIFICANT CHANGE UP (ref 8.4–10.5)
CHLORIDE SERPL-SCNC: 104 MMOL/L — SIGNIFICANT CHANGE UP (ref 96–108)
CO2 SERPL-SCNC: 28 MMOL/L — SIGNIFICANT CHANGE UP (ref 22–31)
CREAT SERPL-MCNC: 1.37 MG/DL — HIGH (ref 0.5–1.3)
CULTURE RESULTS: NO GROWTH — SIGNIFICANT CHANGE UP
GLUCOSE SERPL-MCNC: 100 MG/DL — HIGH (ref 70–99)
HCT VFR BLD CALC: 30.3 % — LOW (ref 39–50)
HGB BLD-MCNC: 9.8 G/DL — LOW (ref 13–17)
MCHC RBC-ENTMCNC: 30.5 PG — SIGNIFICANT CHANGE UP (ref 27–34)
MCHC RBC-ENTMCNC: 32.4 GM/DL — SIGNIFICANT CHANGE UP (ref 32–36)
MCV RBC AUTO: 94.1 FL — SIGNIFICANT CHANGE UP (ref 80–100)
PLATELET # BLD AUTO: 289 K/UL — SIGNIFICANT CHANGE UP (ref 150–400)
POTASSIUM SERPL-MCNC: 3.5 MMOL/L — SIGNIFICANT CHANGE UP (ref 3.5–5.3)
POTASSIUM SERPL-SCNC: 3.5 MMOL/L — SIGNIFICANT CHANGE UP (ref 3.5–5.3)
RBC # BLD: 3.22 M/UL — LOW (ref 4.2–5.8)
RBC # FLD: 11.9 % — SIGNIFICANT CHANGE UP (ref 10.3–14.5)
SODIUM SERPL-SCNC: 140 MMOL/L — SIGNIFICANT CHANGE UP (ref 135–145)
SPECIMEN SOURCE: SIGNIFICANT CHANGE UP
WBC # BLD: 5.9 K/UL — SIGNIFICANT CHANGE UP (ref 3.8–10.5)
WBC # FLD AUTO: 5.9 K/UL — SIGNIFICANT CHANGE UP (ref 3.8–10.5)

## 2017-09-03 PROCEDURE — 99233 SBSQ HOSP IP/OBS HIGH 50: CPT

## 2017-09-03 RX ORDER — PIPERACILLIN AND TAZOBACTAM 4; .5 G/20ML; G/20ML
3.38 INJECTION, POWDER, LYOPHILIZED, FOR SOLUTION INTRAVENOUS ONCE
Qty: 0 | Refills: 0 | Status: COMPLETED | OUTPATIENT
Start: 2017-09-03 | End: 2017-09-03

## 2017-09-03 RX ORDER — PIPERACILLIN AND TAZOBACTAM 4; .5 G/20ML; G/20ML
3.38 INJECTION, POWDER, LYOPHILIZED, FOR SOLUTION INTRAVENOUS EVERY 8 HOURS
Qty: 0 | Refills: 0 | Status: DISCONTINUED | OUTPATIENT
Start: 2017-09-03 | End: 2017-09-04

## 2017-09-03 RX ADMIN — Medication 500 MILLIGRAM(S): at 13:31

## 2017-09-03 RX ADMIN — Medication 100 MILLIGRAM(S): at 21:09

## 2017-09-03 RX ADMIN — PANTOPRAZOLE SODIUM 40 MILLIGRAM(S): 20 TABLET, DELAYED RELEASE ORAL at 06:27

## 2017-09-03 RX ADMIN — LORATADINE 10 MILLIGRAM(S): 10 TABLET ORAL at 17:31

## 2017-09-03 RX ADMIN — Medication 25 MILLIGRAM(S): at 05:53

## 2017-09-03 RX ADMIN — Medication 100 MILLIGRAM(S): at 13:31

## 2017-09-03 RX ADMIN — Medication 40 MILLIGRAM(S): at 05:53

## 2017-09-03 RX ADMIN — Medication 100 MILLIGRAM(S): at 05:53

## 2017-09-03 RX ADMIN — BICALUTAMIDE 50 MILLIGRAM(S): 50 TABLET, FILM COATED ORAL at 13:33

## 2017-09-03 RX ADMIN — Medication 4 MILLIGRAM(S): at 21:09

## 2017-09-03 RX ADMIN — Medication 1 PUFF(S): at 06:41

## 2017-09-03 RX ADMIN — Medication 250 MILLIGRAM(S): at 11:58

## 2017-09-03 RX ADMIN — PIPERACILLIN AND TAZOBACTAM 200 GRAM(S): 4; .5 INJECTION, POWDER, LYOPHILIZED, FOR SOLUTION INTRAVENOUS at 21:08

## 2017-09-03 NOTE — PROGRESS NOTE ADULT - SUBJECTIVE AND OBJECTIVE BOX
VYCRISTINA is a yMale , patient examined and chart reviewed.    INTERVAL HPI/ OVERNIGHT EVENTS:   Afebrile. No events.    PAST MEDICAL & SURGICAL HISTORY:  Hearing loss  Osteoarthritis  Fluid retention in legs  Seasonal allergies  Prostate cancer: 48 treatments of RT  History of hip replacement, total, left  S/P inguinal hernia repair:   S/P cholecystectomy:   History of appendectomy      For details regarding the patient's social history, family history, and other miscellaneous elements, please refer the initial infectious diseases consultation and/or the admitting history and physical examination for this admission.      ROS:  CONSTITUTIONAL:  Negative fever or chills,   EYES:  Negative  blurry vision or double vision  CARDIOVASCULAR:  Negative for chest pain or palpitations  RESPIRATORY:  Negative for cough, wheezing, or SOB   GASTROINTESTINAL:  Negative for nausea, vomiting, diarrhea, constipation, or abdominal pain  GENITOURINARY:  Negative frequency, urgency or dysuria  NEUROLOGIC:  No headache, confusion, dizziness, lightheadedness  All other systems were reviewed and are negative       Current inpatient medications :    ANTIBIOTICS/RELEVANT:  vancomycin  IVPB 1000 milliGRAM(s) IV Intermittent every 24 hours      HYDROmorphone  Injectable 0.5 milliGRAM(s) IV Push every 3 hours PRN  oxyCODONE    IR 5 milliGRAM(s) Oral every 3 hours PRN  oxyCODONE    IR 10 milliGRAM(s) Oral every 3 hours PRN  doxazosin 4 milliGRAM(s) Oral at bedtime  loratadine 10 milliGRAM(s) Oral daily  bicalutamide 50 milliGRAM(s) Oral daily  metoprolol succinate ER 25 milliGRAM(s) Oral daily  furosemide    Tablet 40 milliGRAM(s) Oral daily  bisacodyl Suppository 10 milliGRAM(s) Rectal daily PRN  docusate sodium 100 milliGRAM(s) Oral three times a day  pantoprazole    Tablet 40 milliGRAM(s) Oral before breakfast  ascorbic acid 500 milliGRAM(s) Oral daily  fluticasone propionate   44 MICROgram(s) HFA Inhaler 1 Puff(s) Inhalation daily      Objective:     @ 07:  -   @ 07:00  --------------------------------------------------------  IN: 0 mL / OUT: 1000 mL / NET: -1000 mL     @ 07:01  -   @ 19:28  --------------------------------------------------------  IN: 250 mL / OUT: 665 mL / NET: -415 mL      T(C): 36.9 (17 @ 17:11), Max: 37.4 (17 @ 05:57)  HR: 74 (17 @ 17:11) (74 - 77)  BP: 102/54 (17 @ 17:11) (102/54 - 127/68)  RR: 19 (17 @ 17:11) (16 - 20)  SpO2: 94% (17 @ 17:11) (90% - 95%)  Wt(kg): --      Physical Exam:  General: No acute distress  Eyes: sclera anicteric, pupils equal and reactive to light  ENMT: buccal mucosa moist, pharynx not injected  Neck: supple, trachea midline  Lungs: clear, no wheeze/rhonchi  Cardiovascular: regular rate and rhythm, S1 S2  Abdomen: soft, nontender, no organomegaly present, bowel sounds normal  Neurological: alert and oriented x3, Cranial Nerves II-XII grossly intact  Extremities: no cyanosis/clubbing/edema  Left hip drsg c/d/i + erythema induration      LABS:                          9.8    5.9   )-----------( 289      ( 03 Sep 2017 07:43 )             30.3           140  |  104  |  21  ----------------------------<  100<H>  3.5   |  28  |  1.37<H>    Ca    9.0      03 Sep 2017 07:43    TPro  6.7  /  Alb  2.8<L>  /  TBili  0.3  /  DBili  x   /  AST  21  /  ALT  12  /  AlkPhos  154<H>  02      PT/INR - ( 02 Sep 2017 08:03 )   PT: 13.8 sec;   INR: 1.26 ratio         PTT - ( 02 Sep 2017 08:03 )  PTT:29.2 sec  Urinalysis Basic - ( 02 Sep 2017 08:03 )    Color: Yellow / Appearance: Clear / S.015 / pH: x  Gluc: x / Ketone: Negative  / Bili: Negative / Urobili: Negative mg/dL   Blood: x / Protein: Negative mg/dL / Nitrite: Negative   Leuk Esterase: Small / RBC: 6-10 /HPF / WBC 0-2   Sq Epi: x / Non Sq Epi: x / Bacteria: x      RECENT CULTURES:  Culture - Other (17 @ 12:35)    Specimen Source: .Other Other, post op left hip    Culture Results:   Numerous Gram Negative Rods    Culture - Blood (17 @ 12:00)    Specimen Source: .Blood Blood    Culture Results:   No growth to date.      RADIOLOGY & ADDITIONAL STUDIES:  EXAM:  XR HIP WITH PELV 1V LT                            PROCEDURE DATE:  2017          INTERPRETATION:  Single frontal view the pelvis and 2 views of the left   hip    Clinical indication: Left hip soreness, evaluate for osteomyelitis    FINDINGS: The bones are osteopenic. The patient is status post total left   hip replacement. There is a minimally displaced periprosthetic fracture   along the medial aspect of the proximal left femoral shaft. There are   marked degenerative changes of the right hip with obliteration of the   joint space and degenerative changes of the lower lumbar spine. Calcified   bladder stones are noted. Surgical clips overlie the pubic symphysis and   left greater trochanter.    IMPRESSION: Minimally displaced periprosthetic fracture along the medial   aspect of the proximal left femoral shaft.      Additional findings as above.    Findings were discussed with Dr. Anders of the ED at the time of this   dictation.      Assessment :   84 year old male sp left hip ORIF on 17 admitted for Left hip surgical wound infection  with concerns for deep infection involving prosthetic joint  Seen by ortho  ESR 99  CKD      Plan :   To OR for  I&D and poss joint revision vs explantation and placement of cement spacer  Prelim surface ultures noted  Cont Vancomycin for now pending cultures  Add Zosyn  Monitor renal fx closely while on Vancomycin  Ortho following      I have discussed the above plan of care with patient in detail. He expressed understanding of the  treatment plan . Risks, benefits and alternatives discussed in detail.   I have asked if he has any questions or concerns and appropriately addressed them to the best of my ability .    > 35 minutes were spent in direct patient care reviewing notes, medications ,labs data/ imaging , discussion with multidisciplinary team.    Thank you for allowing me to participate in care of your patient .    Zurdo Braden MD  Infectious Disease  [phone]

## 2017-09-03 NOTE — PROGRESS NOTE ADULT - ASSESSMENT
83 y/o male with pmh of OA, seasonal  rhinitis, Ca Prostate, left anterior THR on aug17  is admitted for infected surgical wound and periprosthetic fracture.

## 2017-09-03 NOTE — PROGRESS NOTE ADULT - SUBJECTIVE AND OBJECTIVE BOX
Patient is a 84y old  Male who presents with a chief complaint of "left hip" (02 Sep 2017 14:21)      INTERVAL HPI/OVERNIGHT EVENTS:  Pt is seen and examined. feels ok.  still thinking for surgery    Pain Location & Control:     MEDICATIONS  (STANDING):  doxazosin 4 milliGRAM(s) Oral at bedtime  loratadine 10 milliGRAM(s) Oral daily  bicalutamide 50 milliGRAM(s) Oral daily  metoprolol succinate ER 25 milliGRAM(s) Oral daily  furosemide    Tablet 40 milliGRAM(s) Oral daily  docusate sodium 100 milliGRAM(s) Oral three times a day  pantoprazole    Tablet 40 milliGRAM(s) Oral before breakfast  ascorbic acid 500 milliGRAM(s) Oral daily  influenza   Vaccine 0.5 milliLiter(s) IntraMuscular once  vancomycin  IVPB 1000 milliGRAM(s) IV Intermittent every 24 hours  fluticasone propionate   44 MICROgram(s) HFA Inhaler 1 Puff(s) Inhalation daily    MEDICATIONS  (PRN):  HYDROmorphone  Injectable 0.5 milliGRAM(s) IV Push every 3 hours PRN Severe Pain (7 - 10)  oxyCODONE    IR 5 milliGRAM(s) Oral every 3 hours PRN Mild Pain (1 - 3)  oxyCODONE    IR 10 milliGRAM(s) Oral every 3 hours PRN Moderate Pain (4 - 6)  bisacodyl Suppository 10 milliGRAM(s) Rectal daily PRN Constipation      Allergies    No Known Allergies    Intolerances            Vital Signs Last 24 Hrs  T(C): 37.4 (03 Sep 2017 05:57), Max: 37.4 (03 Sep 2017 05:57)  T(F): 99.4 (03 Sep 2017 05:57), Max: 99.4 (03 Sep 2017 05:57)  HR: 75 (03 Sep 2017 05:57) (74 - 84)  BP: 127/68 (03 Sep 2017 05:57) (103/60 - 151/67)  BP(mean): --  RR: 18 (03 Sep 2017 05:57) (16 - 21)  SpO2: 90% (03 Sep 2017 05:57) (90% - 95%)        LABS:                        9.8    5.9   )-----------( 289      ( 03 Sep 2017 07:43 )             30.3     03 Sep 2017 07:43    140    |  104    |  21     ----------------------------<  100    3.5     |  28     |  1.37     Ca    9.0        03 Sep 2017 07:43      PT/INR - ( 02 Sep 2017 08:03 )   PT: 13.8 sec;   INR: 1.26 ratio         PTT - ( 02 Sep 2017 08:03 )  PTT:29.2 sec  Urinalysis Basic - ( 02 Sep 2017 08:03 )    Color: Yellow / Appearance: Clear / S.015 / pH: x  Gluc: x / Ketone: Negative  / Bili: Negative / Urobili: Negative mg/dL   Blood: x / Protein: Negative mg/dL / Nitrite: Negative   Leuk Esterase: Small / RBC: 6-10 /HPF / WBC 0-2   Sq Epi: x / Non Sq Epi: x / Bacteria: x      RADIOLOGY & ADDITIONAL TESTS:    Imaging Personally Reviewed:  [ ] YES  [ ] NO    Consultant(s) Notes Reviewed:  [ x] YES  [ ] NO    Care Discussed with Consultants/Other Providers [ x] YES  [ ] NO

## 2017-09-04 ENCOUNTER — RESULT REVIEW (OUTPATIENT)
Age: 82
End: 2017-09-04

## 2017-09-04 ENCOUNTER — TRANSCRIPTION ENCOUNTER (OUTPATIENT)
Age: 82
End: 2017-09-04

## 2017-09-04 LAB
ANION GAP SERPL CALC-SCNC: 6 MMOL/L — SIGNIFICANT CHANGE UP (ref 5–17)
BUN SERPL-MCNC: 26 MG/DL — HIGH (ref 7–23)
CALCIUM SERPL-MCNC: 9 MG/DL — SIGNIFICANT CHANGE UP (ref 8.4–10.5)
CHLORIDE SERPL-SCNC: 104 MMOL/L — SIGNIFICANT CHANGE UP (ref 96–108)
CO2 SERPL-SCNC: 33 MMOL/L — HIGH (ref 22–31)
CREAT SERPL-MCNC: 1.7 MG/DL — HIGH (ref 0.5–1.3)
GLUCOSE SERPL-MCNC: 107 MG/DL — HIGH (ref 70–99)
POTASSIUM SERPL-MCNC: 3.4 MMOL/L — LOW (ref 3.5–5.3)
POTASSIUM SERPL-SCNC: 3.4 MMOL/L — LOW (ref 3.5–5.3)
SODIUM SERPL-SCNC: 143 MMOL/L — SIGNIFICANT CHANGE UP (ref 135–145)

## 2017-09-04 PROCEDURE — 99233 SBSQ HOSP IP/OBS HIGH 50: CPT

## 2017-09-04 PROCEDURE — 93306 TTE W/DOPPLER COMPLETE: CPT | Mod: 26

## 2017-09-04 PROCEDURE — 88300 SURGICAL PATH GROSS: CPT | Mod: 26

## 2017-09-04 PROCEDURE — 73502 X-RAY EXAM HIP UNI 2-3 VIEWS: CPT | Mod: 26,LT

## 2017-09-04 RX ORDER — LORATADINE 10 MG/1
10 TABLET ORAL DAILY
Qty: 0 | Refills: 0 | Status: DISCONTINUED | OUTPATIENT
Start: 2017-09-04 | End: 2017-09-08

## 2017-09-04 RX ORDER — SENNA PLUS 8.6 MG/1
2 TABLET ORAL AT BEDTIME
Qty: 0 | Refills: 0 | Status: DISCONTINUED | OUTPATIENT
Start: 2017-09-04 | End: 2017-09-08

## 2017-09-04 RX ORDER — SODIUM CHLORIDE 9 MG/ML
1000 INJECTION, SOLUTION INTRAVENOUS
Qty: 0 | Refills: 0 | Status: DISCONTINUED | OUTPATIENT
Start: 2017-09-04 | End: 2017-09-04

## 2017-09-04 RX ORDER — ONDANSETRON 8 MG/1
4 TABLET, FILM COATED ORAL EVERY 6 HOURS
Qty: 0 | Refills: 0 | Status: DISCONTINUED | OUTPATIENT
Start: 2017-09-04 | End: 2017-09-08

## 2017-09-04 RX ORDER — OXYCODONE HYDROCHLORIDE 5 MG/1
5 TABLET ORAL
Qty: 0 | Refills: 0 | Status: DISCONTINUED | OUTPATIENT
Start: 2017-09-04 | End: 2017-09-05

## 2017-09-04 RX ORDER — DOXAZOSIN MESYLATE 4 MG
4 TABLET ORAL AT BEDTIME
Qty: 0 | Refills: 0 | Status: DISCONTINUED | OUTPATIENT
Start: 2017-09-04 | End: 2017-09-08

## 2017-09-04 RX ORDER — POTASSIUM CHLORIDE 20 MEQ
10 PACKET (EA) ORAL
Qty: 0 | Refills: 0 | Status: COMPLETED | OUTPATIENT
Start: 2017-09-04 | End: 2017-09-04

## 2017-09-04 RX ORDER — MAGNESIUM HYDROXIDE 400 MG/1
30 TABLET, CHEWABLE ORAL DAILY
Qty: 0 | Refills: 0 | Status: DISCONTINUED | OUTPATIENT
Start: 2017-09-04 | End: 2017-09-08

## 2017-09-04 RX ORDER — POLYETHYLENE GLYCOL 3350 17 G/17G
17 POWDER, FOR SOLUTION ORAL DAILY
Qty: 0 | Refills: 0 | Status: DISCONTINUED | OUTPATIENT
Start: 2017-09-04 | End: 2017-09-08

## 2017-09-04 RX ORDER — HYDROMORPHONE HYDROCHLORIDE 2 MG/ML
0.5 INJECTION INTRAMUSCULAR; INTRAVENOUS; SUBCUTANEOUS
Qty: 0 | Refills: 0 | Status: DISCONTINUED | OUTPATIENT
Start: 2017-09-04 | End: 2017-09-04

## 2017-09-04 RX ORDER — APIXABAN 2.5 MG/1
2.5 TABLET, FILM COATED ORAL EVERY 12 HOURS
Qty: 0 | Refills: 0 | Status: DISCONTINUED | OUTPATIENT
Start: 2017-09-05 | End: 2017-09-08

## 2017-09-04 RX ORDER — HYDROMORPHONE HYDROCHLORIDE 2 MG/ML
0.5 INJECTION INTRAMUSCULAR; INTRAVENOUS; SUBCUTANEOUS
Qty: 0 | Refills: 0 | Status: DISCONTINUED | OUTPATIENT
Start: 2017-09-04 | End: 2017-09-05

## 2017-09-04 RX ORDER — PANTOPRAZOLE SODIUM 20 MG/1
40 TABLET, DELAYED RELEASE ORAL
Qty: 0 | Refills: 0 | Status: DISCONTINUED | OUTPATIENT
Start: 2017-09-04 | End: 2017-09-08

## 2017-09-04 RX ORDER — PIPERACILLIN AND TAZOBACTAM 4; .5 G/20ML; G/20ML
3.38 INJECTION, POWDER, LYOPHILIZED, FOR SOLUTION INTRAVENOUS EVERY 8 HOURS
Qty: 0 | Refills: 0 | Status: DISCONTINUED | OUTPATIENT
Start: 2017-09-04 | End: 2017-09-05

## 2017-09-04 RX ORDER — ONDANSETRON 8 MG/1
4 TABLET, FILM COATED ORAL ONCE
Qty: 0 | Refills: 0 | Status: COMPLETED | OUTPATIENT
Start: 2017-09-04 | End: 2017-09-04

## 2017-09-04 RX ORDER — VANCOMYCIN HCL 1 G
1000 VIAL (EA) INTRAVENOUS EVERY 24 HOURS
Qty: 0 | Refills: 0 | Status: DISCONTINUED | OUTPATIENT
Start: 2017-09-05 | End: 2017-09-08

## 2017-09-04 RX ORDER — SODIUM CHLORIDE 9 MG/ML
1000 INJECTION, SOLUTION INTRAVENOUS
Qty: 0 | Refills: 0 | Status: DISCONTINUED | OUTPATIENT
Start: 2017-09-04 | End: 2017-09-05

## 2017-09-04 RX ORDER — DOCUSATE SODIUM 100 MG
100 CAPSULE ORAL THREE TIMES A DAY
Qty: 0 | Refills: 0 | Status: DISCONTINUED | OUTPATIENT
Start: 2017-09-04 | End: 2017-09-08

## 2017-09-04 RX ORDER — METOPROLOL TARTRATE 50 MG
25 TABLET ORAL DAILY
Qty: 0 | Refills: 0 | Status: DISCONTINUED | OUTPATIENT
Start: 2017-09-04 | End: 2017-09-08

## 2017-09-04 RX ORDER — BICALUTAMIDE 50 MG/1
50 TABLET, FILM COATED ORAL DAILY
Qty: 0 | Refills: 0 | Status: DISCONTINUED | OUTPATIENT
Start: 2017-09-04 | End: 2017-09-08

## 2017-09-04 RX ORDER — OXYCODONE HYDROCHLORIDE 5 MG/1
10 TABLET ORAL
Qty: 0 | Refills: 0 | Status: DISCONTINUED | OUTPATIENT
Start: 2017-09-04 | End: 2017-09-05

## 2017-09-04 RX ADMIN — HYDROMORPHONE HYDROCHLORIDE 0.5 MILLIGRAM(S): 2 INJECTION INTRAMUSCULAR; INTRAVENOUS; SUBCUTANEOUS at 18:43

## 2017-09-04 RX ADMIN — ONDANSETRON 4 MILLIGRAM(S): 8 TABLET, FILM COATED ORAL at 18:32

## 2017-09-04 RX ADMIN — Medication 100 MILLIEQUIVALENT(S): at 09:02

## 2017-09-04 RX ADMIN — Medication 100 MILLIGRAM(S): at 05:36

## 2017-09-04 RX ADMIN — PIPERACILLIN AND TAZOBACTAM 25 GRAM(S): 4; .5 INJECTION, POWDER, LYOPHILIZED, FOR SOLUTION INTRAVENOUS at 13:16

## 2017-09-04 RX ADMIN — Medication 500 MILLIGRAM(S): at 11:55

## 2017-09-04 RX ADMIN — HYDROMORPHONE HYDROCHLORIDE 0.5 MILLIGRAM(S): 2 INJECTION INTRAMUSCULAR; INTRAVENOUS; SUBCUTANEOUS at 23:00

## 2017-09-04 RX ADMIN — LORATADINE 10 MILLIGRAM(S): 10 TABLET ORAL at 11:55

## 2017-09-04 RX ADMIN — Medication 1 PUFF(S): at 06:44

## 2017-09-04 RX ADMIN — HYDROMORPHONE HYDROCHLORIDE 0.5 MILLIGRAM(S): 2 INJECTION INTRAMUSCULAR; INTRAVENOUS; SUBCUTANEOUS at 22:30

## 2017-09-04 RX ADMIN — PIPERACILLIN AND TAZOBACTAM 25 GRAM(S): 4; .5 INJECTION, POWDER, LYOPHILIZED, FOR SOLUTION INTRAVENOUS at 05:36

## 2017-09-04 RX ADMIN — Medication 40 MILLIGRAM(S): at 05:36

## 2017-09-04 RX ADMIN — Medication 100 MILLIGRAM(S): at 13:16

## 2017-09-04 RX ADMIN — Medication 25 MILLIGRAM(S): at 21:22

## 2017-09-04 RX ADMIN — Medication 100 MILLIEQUIVALENT(S): at 10:51

## 2017-09-04 RX ADMIN — BICALUTAMIDE 50 MILLIGRAM(S): 50 TABLET, FILM COATED ORAL at 21:22

## 2017-09-04 RX ADMIN — Medication 25 MILLIGRAM(S): at 05:36

## 2017-09-04 RX ADMIN — PIPERACILLIN AND TAZOBACTAM 25 GRAM(S): 4; .5 INJECTION, POWDER, LYOPHILIZED, FOR SOLUTION INTRAVENOUS at 21:16

## 2017-09-04 RX ADMIN — PANTOPRAZOLE SODIUM 40 MILLIGRAM(S): 20 TABLET, DELAYED RELEASE ORAL at 06:14

## 2017-09-04 RX ADMIN — BICALUTAMIDE 50 MILLIGRAM(S): 50 TABLET, FILM COATED ORAL at 11:55

## 2017-09-04 RX ADMIN — Medication 250 MILLIGRAM(S): at 11:55

## 2017-09-04 RX ADMIN — Medication 4 MILLIGRAM(S): at 21:22

## 2017-09-04 RX ADMIN — HYDROMORPHONE HYDROCHLORIDE 0.5 MILLIGRAM(S): 2 INJECTION INTRAMUSCULAR; INTRAVENOUS; SUBCUTANEOUS at 19:05

## 2017-09-04 NOTE — BRIEF OPERATIVE NOTE - POST-OP DX
Failure of total hip arthroplasty, initial encounter  09/04/2017  Left, Infected, periprosthetic Fracture  Active  Jerry Morales

## 2017-09-04 NOTE — CONSULT NOTE ADULT - ASSESSMENT
The patient is an 84 year old male with a history of HTN, OA, prostate cancer, left THR 8/2017 who presents with periprosthetic fracture and concern for infection, to undergo surgery.    Plan:  - Continue metoprolol succinate  - Continue furosemide  - Echocardiogram with grossly normal LV systolic function, no significant valve issues  - The patient is asymptomatic from a cardiac perspective and there are no active cardiac issues. The patient is optimized to proceed from a cardiac perspective to OR without additional cardiac testing.

## 2017-09-04 NOTE — BRIEF OPERATIVE NOTE - PROCEDURE
Revision of right total hip arthroplasty by posterior approach  09/04/2017  Left, I&D of anterior incision, explantation hardware, insertion cement spacer  Active  [id number]

## 2017-09-04 NOTE — CONSULT NOTE ADULT - SUBJECTIVE AND OBJECTIVE BOX
History of Present Illness: The patient is an 84 year old male with a history of HTN, OA, prostate cancer, left THR 8/2017 who presents with redness and pain of surgical site, found to have periprosthetic fracture. He is planned to undergo surgery. He underwent the prior surgery in August with no issues. Denies chest pain or shortness of breath.    Past Medical/Surgical History:  OA, prostate cancer, left THR 8/2017    Medications:  MEDICATIONS  (STANDING):  doxazosin 4 milliGRAM(s) Oral at bedtime  loratadine 10 milliGRAM(s) Oral daily  bicalutamide 50 milliGRAM(s) Oral daily  metoprolol succinate ER 25 milliGRAM(s) Oral daily  furosemide    Tablet 40 milliGRAM(s) Oral daily  docusate sodium 100 milliGRAM(s) Oral three times a day  pantoprazole    Tablet 40 milliGRAM(s) Oral before breakfast  ascorbic acid 500 milliGRAM(s) Oral daily  influenza   Vaccine 0.5 milliLiter(s) IntraMuscular once  vancomycin  IVPB 1000 milliGRAM(s) IV Intermittent every 24 hours  fluticasone propionate   44 MICROgram(s) HFA Inhaler 1 Puff(s) Inhalation daily  piperacillin/tazobactam IVPB. 3.375 Gram(s) IV Intermittent every 8 hours  potassium chloride  10 mEq/100 mL IVPB 10 milliEquivalent(s) IV Intermittent every 1 hour    MEDICATIONS  (PRN):  HYDROmorphone  Injectable 0.5 milliGRAM(s) IV Push every 3 hours PRN Severe Pain (7 - 10)  oxyCODONE    IR 5 milliGRAM(s) Oral every 3 hours PRN Mild Pain (1 - 3)  oxyCODONE    IR 10 milliGRAM(s) Oral every 3 hours PRN Moderate Pain (4 - 6)  bisacodyl Suppository 10 milliGRAM(s) Rectal daily PRN Constipation       Family History: Non-contributory family history of premature cardiovascular atherosclerotic disease    Social History: No tobacco, alcohol or drug use    Review of Systems:  General: No fevers, chills, weight loss or gain  Skin: No rashes, color changes  Cardiovascular: No chest pain, orthopnea  Respiratory: No shortness of breath, cough  Gastrointestinal: No nausea, abdominal pain  Genitourinary: No incontinence, pain with urination  Musculoskeletal: No pain, swelling, decreased range of motion  Neurological: No headache, weakness  Psychiatric: No depression, anxiety  Endocrine: No weight loss or gain, increased thirst  All other systems are comprehensively negative.    Physical Exam:  Vitals:        Vital Signs Last 24 Hrs  T(C): 37 (04 Sep 2017 05:33), Max: 37.1 (03 Sep 2017 14:25)  T(F): 98.6 (04 Sep 2017 05:33), Max: 98.7 (03 Sep 2017 14:25)  HR: 81 (04 Sep 2017 05:33) (74 - 81)  BP: 119/69 (04 Sep 2017 05:33) (102/54 - 135/65)  BP(mean): --  RR: 18 (04 Sep 2017 05:33) (18 - 20)  SpO2: 91% (04 Sep 2017 05:33) (90% - 94%)  General: NAD  Neck: No JVD, no carotid bruit  Lungs: CTAB  CV: RRR, nl S1/S2, no M/R/G  Abdomen: S/NT/ND, +BS  Extremities: No LE edema, no cyanosis    Labs:                        9.8    5.9   )-----------( 289      ( 03 Sep 2017 07:43 )             30.3     09-04    143  |  104  |  26<H>  ----------------------------<  107<H>  3.4<L>   |  33<H>  |  1.70<H>    Ca    9.0      04 Sep 2017 07:31              ECG: NSR, normal axis, nonspecific ST abnormality

## 2017-09-04 NOTE — PROGRESS NOTE ADULT - SUBJECTIVE AND OBJECTIVE BOX
Patient is a 84y old  Male who presents with a chief complaint of "left hip" (02 Sep 2017 14:21)      INTERVAL HPI/OVERNIGHT EVENTS:  Pt is seen and examined.  for or today. no new complain.    Pain Location & Control:     MEDICATIONS  (STANDING):  doxazosin 4 milliGRAM(s) Oral at bedtime  loratadine 10 milliGRAM(s) Oral daily  bicalutamide 50 milliGRAM(s) Oral daily  metoprolol succinate ER 25 milliGRAM(s) Oral daily  furosemide    Tablet 40 milliGRAM(s) Oral daily  docusate sodium 100 milliGRAM(s) Oral three times a day  pantoprazole    Tablet 40 milliGRAM(s) Oral before breakfast  ascorbic acid 500 milliGRAM(s) Oral daily  influenza   Vaccine 0.5 milliLiter(s) IntraMuscular once  vancomycin  IVPB 1000 milliGRAM(s) IV Intermittent every 24 hours  fluticasone propionate   44 MICROgram(s) HFA Inhaler 1 Puff(s) Inhalation daily  piperacillin/tazobactam IVPB. 3.375 Gram(s) IV Intermittent every 8 hours  potassium chloride  10 mEq/100 mL IVPB 10 milliEquivalent(s) IV Intermittent every 1 hour    MEDICATIONS  (PRN):  HYDROmorphone  Injectable 0.5 milliGRAM(s) IV Push every 3 hours PRN Severe Pain (7 - 10)  oxyCODONE    IR 5 milliGRAM(s) Oral every 3 hours PRN Mild Pain (1 - 3)  oxyCODONE    IR 10 milliGRAM(s) Oral every 3 hours PRN Moderate Pain (4 - 6)  bisacodyl Suppository 10 milliGRAM(s) Rectal daily PRN Constipation      Allergies    No Known Allergies    Intolerances            Vital Signs Last 24 Hrs  T(C): 36.7 (04 Sep 2017 10:12), Max: 37.1 (03 Sep 2017 14:25)  T(F): 98.1 (04 Sep 2017 10:12), Max: 98.7 (03 Sep 2017 14:25)  HR: 75 (04 Sep 2017 10:12) (74 - 81)  BP: 121/76 (04 Sep 2017 10:12) (102/54 - 135/65)  BP(mean): --  RR: 16 (04 Sep 2017 10:12) (16 - 20)  SpO2: 93% (04 Sep 2017 10:12) (90% - 94%)        LABS:    04 Sep 2017 07:31    143    |  104    |  26     ----------------------------<  107    3.4     |  33     |  1.70     Ca    9.0        04 Sep 2017 07:31        RADIOLOGY & ADDITIONAL TESTS:    Imaging Personally Reviewed:  [ ] YES  [ ] NO    Consultant(s) Notes Reviewed:  [ x] YES  [ ] NO    Care Discussed with Consultants/Other Providers [x ] YES  [ ] NO

## 2017-09-05 DIAGNOSIS — R41.82 ALTERED MENTAL STATUS, UNSPECIFIED: ICD-10-CM

## 2017-09-05 DIAGNOSIS — E66.9 OBESITY, UNSPECIFIED: ICD-10-CM

## 2017-09-05 DIAGNOSIS — R09.02 HYPOXEMIA: ICD-10-CM

## 2017-09-05 LAB
-  AMIKACIN: SIGNIFICANT CHANGE UP
-  AMIKACIN: SIGNIFICANT CHANGE UP
-  AMPICILLIN/SULBACTAM: SIGNIFICANT CHANGE UP
-  AMPICILLIN: SIGNIFICANT CHANGE UP
-  AZTREONAM: SIGNIFICANT CHANGE UP
-  AZTREONAM: SIGNIFICANT CHANGE UP
-  CEFAZOLIN: SIGNIFICANT CHANGE UP
-  CEFEPIME: SIGNIFICANT CHANGE UP
-  CEFEPIME: SIGNIFICANT CHANGE UP
-  CEFOXITIN: SIGNIFICANT CHANGE UP
-  CEFTAZIDIME: SIGNIFICANT CHANGE UP
-  CEFTAZIDIME: SIGNIFICANT CHANGE UP
-  CEFTRIAXONE: SIGNIFICANT CHANGE UP
-  CIPROFLOXACIN: SIGNIFICANT CHANGE UP
-  CIPROFLOXACIN: SIGNIFICANT CHANGE UP
-  ERTAPENEM: SIGNIFICANT CHANGE UP
-  GENTAMICIN: SIGNIFICANT CHANGE UP
-  GENTAMICIN: SIGNIFICANT CHANGE UP
-  IMIPENEM: SIGNIFICANT CHANGE UP
-  IMIPENEM: SIGNIFICANT CHANGE UP
-  LEVOFLOXACIN: SIGNIFICANT CHANGE UP
-  LEVOFLOXACIN: SIGNIFICANT CHANGE UP
-  MEROPENEM: SIGNIFICANT CHANGE UP
-  MEROPENEM: SIGNIFICANT CHANGE UP
-  PIPERACILLIN/TAZOBACTAM: SIGNIFICANT CHANGE UP
-  PIPERACILLIN/TAZOBACTAM: SIGNIFICANT CHANGE UP
-  TOBRAMYCIN: SIGNIFICANT CHANGE UP
-  TOBRAMYCIN: SIGNIFICANT CHANGE UP
-  TRIMETHOPRIM/SULFAMETHOXAZOLE: SIGNIFICANT CHANGE UP
ANION GAP SERPL CALC-SCNC: 7 MMOL/L — SIGNIFICANT CHANGE UP (ref 5–17)
BASE EXCESS BLDA CALC-SCNC: 8.4 MMOL/L — HIGH (ref -2–2)
BLOOD GAS SOURCE: SIGNIFICANT CHANGE UP
BUN SERPL-MCNC: 24 MG/DL — HIGH (ref 7–23)
CALCIUM SERPL-MCNC: 8.5 MG/DL — SIGNIFICANT CHANGE UP (ref 8.4–10.5)
CHLORIDE SERPL-SCNC: 102 MMOL/L — SIGNIFICANT CHANGE UP (ref 96–108)
CO2 SERPL-SCNC: 33 MMOL/L — HIGH (ref 22–31)
CREAT SERPL-MCNC: 1.7 MG/DL — HIGH (ref 0.5–1.3)
CULTURE RESULTS: SIGNIFICANT CHANGE UP
GLUCOSE SERPL-MCNC: 123 MG/DL — HIGH (ref 70–99)
HCO3 BLDA-SCNC: 31 MMOL/L — HIGH (ref 21–29)
HCT VFR BLD CALC: 31.8 % — LOW (ref 39–50)
HGB BLD-MCNC: 10.3 G/DL — LOW (ref 13–17)
HOROWITZ INDEX BLDA+IHG-RTO: 36 — SIGNIFICANT CHANGE UP
MCHC RBC-ENTMCNC: 30.6 PG — SIGNIFICANT CHANGE UP (ref 27–34)
MCHC RBC-ENTMCNC: 32.4 GM/DL — SIGNIFICANT CHANGE UP (ref 32–36)
MCV RBC AUTO: 94.2 FL — SIGNIFICANT CHANGE UP (ref 80–100)
METHOD TYPE: SIGNIFICANT CHANGE UP
METHOD TYPE: SIGNIFICANT CHANGE UP
NIGHT BLUE STAIN TISS: SIGNIFICANT CHANGE UP
NT-PROBNP SERPL-SCNC: 626 PG/ML — HIGH (ref 0–450)
ORGANISM # SPEC MICROSCOPIC CNT: SIGNIFICANT CHANGE UP
PCO2 BLDA: 58 MMHG — HIGH (ref 32–46)
PH BLD: 7.38 — SIGNIFICANT CHANGE UP (ref 7.35–7.45)
PLATELET # BLD AUTO: 292 K/UL — SIGNIFICANT CHANGE UP (ref 150–400)
PO2 BLDA: 61 MMHG — LOW (ref 74–108)
POTASSIUM SERPL-MCNC: 3.9 MMOL/L — SIGNIFICANT CHANGE UP (ref 3.5–5.3)
POTASSIUM SERPL-SCNC: 3.9 MMOL/L — SIGNIFICANT CHANGE UP (ref 3.5–5.3)
RBC # BLD: 3.37 M/UL — LOW (ref 4.2–5.8)
RBC # FLD: 11.8 % — SIGNIFICANT CHANGE UP (ref 10.3–14.5)
SAO2 % BLDA: 91 % — LOW (ref 92–96)
SODIUM SERPL-SCNC: 142 MMOL/L — SIGNIFICANT CHANGE UP (ref 135–145)
SPECIMEN SOURCE: SIGNIFICANT CHANGE UP
VANCOMYCIN TROUGH SERPL-MCNC: 13.7 UG/ML — SIGNIFICANT CHANGE UP (ref 10–20)
WBC # BLD: 7.6 K/UL — SIGNIFICANT CHANGE UP (ref 3.8–10.5)
WBC # FLD AUTO: 7.6 K/UL — SIGNIFICANT CHANGE UP (ref 3.8–10.5)

## 2017-09-05 PROCEDURE — 71010: CPT | Mod: 26

## 2017-09-05 PROCEDURE — 70450 CT HEAD/BRAIN W/O DYE: CPT | Mod: 26

## 2017-09-05 PROCEDURE — 99233 SBSQ HOSP IP/OBS HIGH 50: CPT

## 2017-09-05 RX ORDER — MEROPENEM 1 G/30ML
INJECTION INTRAVENOUS
Qty: 0 | Refills: 0 | Status: DISCONTINUED | OUTPATIENT
Start: 2017-09-05 | End: 2017-09-08

## 2017-09-05 RX ORDER — TRAMADOL HYDROCHLORIDE 50 MG/1
25 TABLET ORAL THREE TIMES A DAY
Qty: 0 | Refills: 0 | Status: DISCONTINUED | OUTPATIENT
Start: 2017-09-05 | End: 2017-09-08

## 2017-09-05 RX ORDER — MEROPENEM 1 G/30ML
1000 INJECTION INTRAVENOUS EVERY 12 HOURS
Qty: 0 | Refills: 0 | Status: DISCONTINUED | OUTPATIENT
Start: 2017-09-06 | End: 2017-09-08

## 2017-09-05 RX ORDER — MEROPENEM 1 G/30ML
1000 INJECTION INTRAVENOUS ONCE
Qty: 0 | Refills: 0 | Status: COMPLETED | OUTPATIENT
Start: 2017-09-05 | End: 2017-09-05

## 2017-09-05 RX ORDER — SODIUM CHLORIDE 9 MG/ML
1000 INJECTION, SOLUTION INTRAVENOUS
Qty: 0 | Refills: 0 | Status: DISCONTINUED | OUTPATIENT
Start: 2017-09-05 | End: 2017-09-08

## 2017-09-05 RX ADMIN — HYDROMORPHONE HYDROCHLORIDE 0.5 MILLIGRAM(S): 2 INJECTION INTRAMUSCULAR; INTRAVENOUS; SUBCUTANEOUS at 06:30

## 2017-09-05 RX ADMIN — SODIUM CHLORIDE 60 MILLILITER(S): 9 INJECTION, SOLUTION INTRAVENOUS at 17:15

## 2017-09-05 RX ADMIN — APIXABAN 2.5 MILLIGRAM(S): 2.5 TABLET, FILM COATED ORAL at 21:09

## 2017-09-05 RX ADMIN — HYDROMORPHONE HYDROCHLORIDE 0.5 MILLIGRAM(S): 2 INJECTION INTRAMUSCULAR; INTRAVENOUS; SUBCUTANEOUS at 01:40

## 2017-09-05 RX ADMIN — HYDROMORPHONE HYDROCHLORIDE 0.5 MILLIGRAM(S): 2 INJECTION INTRAMUSCULAR; INTRAVENOUS; SUBCUTANEOUS at 02:10

## 2017-09-05 RX ADMIN — APIXABAN 2.5 MILLIGRAM(S): 2.5 TABLET, FILM COATED ORAL at 12:01

## 2017-09-05 RX ADMIN — HYDROMORPHONE HYDROCHLORIDE 0.5 MILLIGRAM(S): 2 INJECTION INTRAMUSCULAR; INTRAVENOUS; SUBCUTANEOUS at 06:01

## 2017-09-05 RX ADMIN — Medication 100 MILLIGRAM(S): at 21:09

## 2017-09-05 RX ADMIN — Medication 25 MILLIGRAM(S): at 05:25

## 2017-09-05 RX ADMIN — PANTOPRAZOLE SODIUM 40 MILLIGRAM(S): 20 TABLET, DELAYED RELEASE ORAL at 05:26

## 2017-09-05 RX ADMIN — Medication 4 MILLIGRAM(S): at 21:09

## 2017-09-05 RX ADMIN — Medication 250 MILLIGRAM(S): at 11:50

## 2017-09-05 RX ADMIN — PIPERACILLIN AND TAZOBACTAM 25 GRAM(S): 4; .5 INJECTION, POWDER, LYOPHILIZED, FOR SOLUTION INTRAVENOUS at 14:56

## 2017-09-05 RX ADMIN — BICALUTAMIDE 50 MILLIGRAM(S): 50 TABLET, FILM COATED ORAL at 12:04

## 2017-09-05 RX ADMIN — MEROPENEM 200 MILLIGRAM(S): 1 INJECTION INTRAVENOUS at 17:14

## 2017-09-05 RX ADMIN — PIPERACILLIN AND TAZOBACTAM 25 GRAM(S): 4; .5 INJECTION, POWDER, LYOPHILIZED, FOR SOLUTION INTRAVENOUS at 05:25

## 2017-09-05 RX ADMIN — LORATADINE 10 MILLIGRAM(S): 10 TABLET ORAL at 12:00

## 2017-09-05 NOTE — PROGRESS NOTE ADULT - ASSESSMENT
85 y/o male with pmh of OA, seasonal  rhinitis, Ca Prostate, left anterior THR on aug17  is admitted for infected surgical wound and periprosthetic fracture.

## 2017-09-05 NOTE — OCCUPATIONAL THERAPY INITIAL EVALUATION ADULT - PERTINENT HX OF CURRENT PROBLEM, REHAB EVAL
85 y/o male s/p Revision Left hip, I & D, explantation and insertion of cement spacer 09/04/17. Pt admitted 9/02/17 with redness at surgical site and c/o left hip pain s/p Left anterior THR 08/17/17 by Dr. Perry. Pt with +periprosthetic fx left hip. 83 y/o male s/p Revision Left hip, I & D, explantation and insertion of cement spacer 09/04/17. Pt admitted 9/02/17 from [facility] with redness at surgical site and c/o left hip pain s/p Left anterior THR 08/17/17 by Dr. Perry. Pt with +infection and +periprosetic fx left hip.

## 2017-09-05 NOTE — PROGRESS NOTE ADULT - SUBJECTIVE AND OBJECTIVE BOX
CRISTINA MCCLELLAN [id number]    Pt is a 84y year old Male s/p revision left THR. pain is 7/10. Tolerating regular diet, (+) voids.  Denies chest pain/shortness of breath/nausea/vomitting.     Vital Signs Last 24 Hrs  T(C): 37.2 (05 Sep 2017 05:02), Max: 37.2 (05 Sep 2017 05:02)  T(F): 98.9 (05 Sep 2017 05:02), Max: 98.9 (05 Sep 2017 05:02)  HR: 85 (05 Sep 2017 05:02) (75 - 96)  BP: 136/72 (05 Sep 2017 05:02) (108/74 - 184/84)  BP(mean): --  RR: 18 (05 Sep 2017 05:02) (14 - 21)  SpO2: 94% (05 Sep 2017 05:02) (90% - 99%)    I&O's Detail    04 Sep 2017 07:01  -  05 Sep 2017 07:00  --------------------------------------------------------  IN:    IV PiggyBack: 150 mL    lactated ringers.: 1600 mL    lactated ringers.: 1100 mL  Total IN: 2850 mL    OUT:    Estimated Blood Loss: 200 mL    Indwelling Catheter - Urethral: 920 mL    Voided: 835 mL  Total OUT: 1955 mL    Total NET: 895 mL                                10.3   7.6   )-----------( 292      ( 05 Sep 2017 07:34 )             31.8     09-05    142  |  102  |  24<H>  ----------------------------<  123<H>  3.9   |  33<H>  |  1.70<H>    Ca    8.5      05 Sep 2017 07:34          PE:   LLE: Dressing CDI, Sensation intact to light touch distally, (+2) DP/PT pulses, EHL/FHL/TA intact, Capillary refill < 2 seconds. negative calf tenderness. PAS on, abduction pillow in place     A: 84y year old Male s/p revision left THR POD#1    Plan:   -DVT ppx = PAS +  apixaban 2.5 milliGRAM(s) Oral every 12 hours    -PT/OT = OOB  -Hip dislocation precautions  -Pain control   - TTWB  -Medicine to follow   -Continue to Follow Labs  - Dressing change POD#2  -Incentive spirometry

## 2017-09-05 NOTE — PROGRESS NOTE ADULT - ASSESSMENT
The patient is an 84 year old male with a history of HTN, OA, prostate cancer, left THR 8/2017 who presents with periprosthetic fracture and concern for infection, to undergo surgery.    Plan:  - Continue metoprolol succinate  - Continue furosemide  - Echocardiogram with grossly normal LV systolic function, no significant valve issues  - On apixaban for DVT prophylaxis  - On vancomycin, zosyn The patient is an 84 year old male with a history of HTN, OA, prostate cancer, left THR 8/2017 who presents with periprosthetic fracture and concern for infection, to undergo surgery.    Plan:  - Continue metoprolol succinate  - Holding furosemide. On IVF.  - Echocardiogram with grossly normal LV systolic function, no significant valve issues  - On apixaban for DVT prophylaxis  - On vancomycin, zosyn

## 2017-09-05 NOTE — OCCUPATIONAL THERAPY INITIAL EVALUATION ADULT - GENERAL OBSERVATIONS, REHAB EVAL
Pt found supine in bed +IV, PCA, +PAS, +abduction wedge, supplemental oxygen Pt found supine in bed +IV, +Carmen catheter, +telemonitor, +bandage left hip, +abduction wedge, supplemental oxygen 3 liters via nasal cannula.

## 2017-09-05 NOTE — CONSULT NOTE ADULT - SUBJECTIVE AND OBJECTIVE BOX
Date/Time Patient Seen:  		  Referring MD:   Data Reviewed	       Patient is a 84y old  Male who presents with a chief complaint of "left hip" (02 Sep 2017 14:21)      Subjective/HPI    seen and examined, vs and meds reviewed, spoke with family at the bedside,   lethargic, but easily arousable  ABG ordered and reviewed  pt with complicated - operative and post op course  on oxygen at present    The patient is an 84 year old male with a history of HTN, OA, prostate cancer, left THR 8/2017 who presents with periprosthetic fracture and concern for infection, to undergo surgery.         PAST MEDICAL & SURGICAL HISTORY:  Hearing loss  Osteoarthritis  Fluid retention in legs  Seasonal allergies  Prostate cancer: 48 treatments of RT  History of hip replacement, total, left  S/P inguinal hernia repair: 1961  S/P cholecystectomy: 1985  History of appendectomy        Medication list         MEDICATIONS  (STANDING):  influenza   Vaccine 0.5 milliLiter(s) IntraMuscular once  piperacillin/tazobactam IVPB. 3.375 Gram(s) IV Intermittent every 8 hours  vancomycin  IVPB 1000 milliGRAM(s) IV Intermittent every 24 hours  apixaban 2.5 milliGRAM(s) Oral every 12 hours  doxazosin 4 milliGRAM(s) Oral at bedtime  loratadine 10 milliGRAM(s) Oral daily  bicalutamide 50 milliGRAM(s) Oral daily  metoprolol succinate ER 25 milliGRAM(s) Oral daily  pantoprazole    Tablet 40 milliGRAM(s) Oral before breakfast  docusate sodium 100 milliGRAM(s) Oral three times a day  dextrose 5% + sodium chloride 0.45%. 1000 milliLiter(s) (60 mL/Hr) IV Continuous <Continuous>    MEDICATIONS  (PRN):  aluminum hydroxide/magnesium hydroxide/simethicone Suspension 30 milliLiter(s) Oral four times a day PRN Indigestion  ondansetron Injectable 4 milliGRAM(s) IV Push every 6 hours PRN Nausea and/or Vomiting  polyethylene glycol 3350 17 Gram(s) Oral daily PRN Constipation  senna 2 Tablet(s) Oral at bedtime PRN Constipation  magnesium hydroxide Suspension 30 milliLiter(s) Oral daily PRN Constipation         Vitals log        ICU Vital Signs Last 24 Hrs  T(C): 36.6 (05 Sep 2017 09:32), Max: 37.2 (05 Sep 2017 05:02)  T(F): 97.9 (05 Sep 2017 09:32), Max: 98.9 (05 Sep 2017 05:02)  HR: 89 (05 Sep 2017 09:32) (80 - 96)  BP: 129/64 (05 Sep 2017 09:32) (108/74 - 184/84)  BP(mean): --  ABP: --  ABP(mean): --  RR: 18 (05 Sep 2017 09:32) (14 - 21)  SpO2: 90% (05 Sep 2017 09:32) (90% - 99%)           Input and Output:  I&O's Detail    04 Sep 2017 07:01  -  05 Sep 2017 07:00  --------------------------------------------------------  IN:    IV PiggyBack: 150 mL    lactated ringers.: 1600 mL    lactated ringers.: 1100 mL  Total IN: 2850 mL    OUT:    Estimated Blood Loss: 200 mL    Indwelling Catheter - Urethral: 920 mL    Voided: 835 mL  Total OUT: 1955 mL    Total NET: 895 mL      05 Sep 2017 07:01  -  05 Sep 2017 14:34  --------------------------------------------------------  IN:    Oral Fluid: 150 mL  Total IN: 150 mL    OUT:  Total OUT: 0 mL    Total NET: 150 mL          Lab Data                        10.3   7.6   )-----------( 292      ( 05 Sep 2017 07:34 )             31.8     09-05    142  |  102  |  24<H>  ----------------------------<  123<H>  3.9   |  33<H>  |  1.70<H>    Ca    8.5      05 Sep 2017 07:34      ABG - ( 05 Sep 2017 10:41 )  pH: x     /  pCO2: 58    /  pO2: 61    / HCO3: 31    / Base Excess: 8.4   /  SaO2: 91            non smoker  lives in [place]  ex smoker, remote history  has 4 children              Review of Systems	    dec alertness, but arousable with tactile and verbal stimuli      Objective     Physical Examination    obese, neck obese, head at, heart - s1s2, lungs- no wheezing, extr- chr changes      Pertinent Lab findings & Imaging      Kermit:  NO   Adequate UO     I&O's Detail    04 Sep 2017 07:01  -  05 Sep 2017 07:00  --------------------------------------------------------  IN:    IV PiggyBack: 150 mL    lactated ringers.: 1600 mL    lactated ringers.: 1100 mL  Total IN: 2850 mL    OUT:    Estimated Blood Loss: 200 mL    Indwelling Catheter - Urethral: 920 mL    Voided: 835 mL  Total OUT: 1955 mL    Total NET: 895 mL      05 Sep 2017 07:01  -  05 Sep 2017 14:34  --------------------------------------------------------  IN:    Oral Fluid: 150 mL  Total IN: 150 mL    OUT:  Total OUT: 0 mL    Total NET: 150 mL               Discussed with:     Cultures:	        Radiology      cxr - diminished volumes

## 2017-09-05 NOTE — PROGRESS NOTE ADULT - SUBJECTIVE AND OBJECTIVE BOX
Patient is a 84y old  Male who presents with a chief complaint of "left hip" (02 Sep 2017 14:21)      INTERVAL HPI/OVERNIGHT EVENTS:  Pt is seen and examined.  pt is drowsy, answering verbal command, says he is very sleepy, received his pain meds Dilaudid at 6am.   c/p pain in left hip on and off.    Pain Location & Control:     MEDICATIONS  (STANDING):  influenza   Vaccine 0.5 milliLiter(s) IntraMuscular once  piperacillin/tazobactam IVPB. 3.375 Gram(s) IV Intermittent every 8 hours  vancomycin  IVPB 1000 milliGRAM(s) IV Intermittent every 24 hours  apixaban 2.5 milliGRAM(s) Oral every 12 hours  doxazosin 4 milliGRAM(s) Oral at bedtime  loratadine 10 milliGRAM(s) Oral daily  bicalutamide 50 milliGRAM(s) Oral daily  metoprolol succinate ER 25 milliGRAM(s) Oral daily  pantoprazole    Tablet 40 milliGRAM(s) Oral before breakfast  docusate sodium 100 milliGRAM(s) Oral three times a day  dextrose 5% + sodium chloride 0.45%. 1000 milliLiter(s) (60 mL/Hr) IV Continuous <Continuous>    MEDICATIONS  (PRN):  aluminum hydroxide/magnesium hydroxide/simethicone Suspension 30 milliLiter(s) Oral four times a day PRN Indigestion  ondansetron Injectable 4 milliGRAM(s) IV Push every 6 hours PRN Nausea and/or Vomiting  polyethylene glycol 3350 17 Gram(s) Oral daily PRN Constipation  senna 2 Tablet(s) Oral at bedtime PRN Constipation  magnesium hydroxide Suspension 30 milliLiter(s) Oral daily PRN Constipation  oxyCODONE    IR 5 milliGRAM(s) Oral every 3 hours PRN Mild Pain  oxyCODONE    IR 10 milliGRAM(s) Oral every 3 hours PRN Moderate Pain      Allergies    No Known Allergies      Vital Signs Last 24 Hrs  T(C): 36.6 (05 Sep 2017 09:32), Max: 37.2 (05 Sep 2017 05:02)  T(F): 97.9 (05 Sep 2017 09:32), Max: 98.9 (05 Sep 2017 05:02)  HR: 89 (05 Sep 2017 09:32) (75 - 96)  BP: 129/64 (05 Sep 2017 09:32) (108/74 - 184/84)  BP(mean): --  RR: 18 (05 Sep 2017 09:32) (14 - 21)  SpO2: 90% (05 Sep 2017 09:32) (90% - 99%)        LABS:                        10.3   7.6   )-----------( 292      ( 05 Sep 2017 07:34 )             31.8     05 Sep 2017 07:34    142    |  102    |  24     ----------------------------<  123    3.9     |  33     |  1.70     Ca    8.5        05 Sep 2017 07:34      RADIOLOGY & ADDITIONAL TESTS:    Imaging Personally Reviewed:  [ ] YES  [ ] NO    Consultant(s) Notes Reviewed:  [x ] YES  [ ] NO    Care Discussed with Consultants/Other Providers [x ] YES  [ ] NO Patient is a 84y old  Male who presents with a chief complaint of "left hip" (02 Sep 2017 14:21)      INTERVAL HPI/OVERNIGHT EVENTS:  Pt is seen and examined.  84y Male  s/p  Left  Hip  Revision THR, I&D, cement spacer    pt is drowsy, answering verbal command, says he is very sleepy, received his pain meds Dilaudid at 6am.   c/p pain in left hip on and off.    Pain Location & Control:     MEDICATIONS  (STANDING):  influenza   Vaccine 0.5 milliLiter(s) IntraMuscular once  piperacillin/tazobactam IVPB. 3.375 Gram(s) IV Intermittent every 8 hours  vancomycin  IVPB 1000 milliGRAM(s) IV Intermittent every 24 hours  apixaban 2.5 milliGRAM(s) Oral every 12 hours  doxazosin 4 milliGRAM(s) Oral at bedtime  loratadine 10 milliGRAM(s) Oral daily  bicalutamide 50 milliGRAM(s) Oral daily  metoprolol succinate ER 25 milliGRAM(s) Oral daily  pantoprazole    Tablet 40 milliGRAM(s) Oral before breakfast  docusate sodium 100 milliGRAM(s) Oral three times a day  dextrose 5% + sodium chloride 0.45%. 1000 milliLiter(s) (60 mL/Hr) IV Continuous <Continuous>    MEDICATIONS  (PRN):  aluminum hydroxide/magnesium hydroxide/simethicone Suspension 30 milliLiter(s) Oral four times a day PRN Indigestion  ondansetron Injectable 4 milliGRAM(s) IV Push every 6 hours PRN Nausea and/or Vomiting  polyethylene glycol 3350 17 Gram(s) Oral daily PRN Constipation  senna 2 Tablet(s) Oral at bedtime PRN Constipation  magnesium hydroxide Suspension 30 milliLiter(s) Oral daily PRN Constipation  oxyCODONE    IR 5 milliGRAM(s) Oral every 3 hours PRN Mild Pain  oxyCODONE    IR 10 milliGRAM(s) Oral every 3 hours PRN Moderate Pain      Allergies    No Known Allergies      Vital Signs Last 24 Hrs  T(C): 36.6 (05 Sep 2017 09:32), Max: 37.2 (05 Sep 2017 05:02)  T(F): 97.9 (05 Sep 2017 09:32), Max: 98.9 (05 Sep 2017 05:02)  HR: 89 (05 Sep 2017 09:32) (75 - 96)  BP: 129/64 (05 Sep 2017 09:32) (108/74 - 184/84)  BP(mean): --  RR: 18 (05 Sep 2017 09:32) (14 - 21)  SpO2: 90% (05 Sep 2017 09:32) (90% - 99%)        LABS:                        10.3   7.6   )-----------( 292      ( 05 Sep 2017 07:34 )             31.8     05 Sep 2017 07:34    142    |  102    |  24     ----------------------------<  123    3.9     |  33     |  1.70     Ca    8.5        05 Sep 2017 07:34      RADIOLOGY & ADDITIONAL TESTS:    Imaging Personally Reviewed:  [ ] YES  [ ] NO    Consultant(s) Notes Reviewed:  [x ] YES  [ ] NO    Care Discussed with Consultants/Other Providers [x ] YES  [ ] NO

## 2017-09-05 NOTE — PHYSICAL THERAPY INITIAL EVALUATION ADULT - RANGE OF MOTION EXAMINATION, REHAB EVAL
Right LE ROM was WFL (within functional limits)/left not tested due to surgery and pain observed at 70 deg sitting EOB/bilateral upper extremity ROM was WFL (within functional limits)

## 2017-09-05 NOTE — CONSULT NOTE ADULT - ASSESSMENT
85 y/o male with pmh of OA, seasonal  rhinitis, Ca Prostate, left anterior THR on aug17th  is admitted with c/o pain in left hip, +redness around surgical site for few days. Pt was started on po antibiotics with no improvement of symptoms so admitted for further evaluation. Xray revealed periprosthetic fracture. Pt was evaluated by ortho in ED,  S/p debridement and revision of join yesterday.  Seen for AMS/Lethargy. Received Dilaudid this am for pain. Mentation is improving.  No signs of meningitis.  Limited but non focal exam.  CT Head - No acute pathology.  No signs of CVA.  Avoid narcotic pain meds.  Dilaudid is discontinued.  Use Tramadol PRN for pain.

## 2017-09-05 NOTE — CONSULT NOTE ADULT - PROBLEM SELECTOR RECOMMENDATION 9
most likely due to Narcotics   arousable  verbal  will hold Opioids  will manage pain with non opioid options  ABG noted  NIPPV ordered PRN use  will monitor pulse ox continuous

## 2017-09-05 NOTE — PHYSICAL THERAPY INITIAL EVALUATION ADULT - TRANSFER SAFETY CONCERNS NOTED: SIT/STAND, REHAB EVAL
decreased safety awareness/decreased step length/inability to maintain weight-bearing restrictions w/o assist

## 2017-09-05 NOTE — DIETITIAN INITIAL EVALUATION ADULT. - OTHER INFO
84M adm c redness at L hip surgical site (had L THR on 8/17), s/p L hip revision, on iv abx tx. Pt presently on regular diet c poor po intake today, noted to be given ativan this am and has been lethargic and sleepy since then; pt only nodded when writer asked questions, unable to obtain any pertinent nutrition information. Refuse breakfast and lunch per RN but states dtr visited later in day and was able to feed pt pudding and some juice. Po varied t/o adm (50-80%). Aspiration precautions in place. Skin: L hip surgical site, r buttock dti. 1+ BL pedal, L leg edema noted. Pt also p/w FABY on CKD.

## 2017-09-05 NOTE — PROGRESS NOTE ADULT - SUBJECTIVE AND OBJECTIVE BOX
VY CRISTINA is a 84yMale , patient examined and chart reviewed.       INTERVAL HPI/ OVERNIGHT EVENTS:   POD 1 Revision of right total hip arthroplasty by posterior approach  09/04/2017    Left, I&D of anterior incision, explantation hardware, insertion cement spacer.  Febrile to 100.9 post op.    PAST MEDICAL & SURGICAL HISTORY:  Hearing loss  Osteoarthritis  Fluid retention in legs  Seasonal allergies  Prostate cancer: 48 treatments of RT  History of hip replacement, total, left  S/P inguinal hernia repair: 1961  S/P cholecystectomy: 1985  History of appendectomy      For details regarding the patient's social history, family history, and other miscellaneous elements, please refer the initial infectious diseases consultation and/or the admitting history and physical examination for this admission.    ROS:  Unable to obtain due to : Lethargy due to pain meds    Current inpatient medications :    ANTIBIOTICS/RELEVANT:  piperacillin/tazobactam IVPB. 3.375 Gram(s) IV Intermittent every 8 hours  vancomycin  IVPB 1000 milliGRAM(s) IV Intermittent every 24 hours      apixaban 2.5 milliGRAM(s) Oral every 12 hours  doxazosin 4 milliGRAM(s) Oral at bedtime  loratadine 10 milliGRAM(s) Oral daily  bicalutamide 50 milliGRAM(s) Oral daily  metoprolol succinate ER 25 milliGRAM(s) Oral daily  pantoprazole    Tablet 40 milliGRAM(s) Oral before breakfast  aluminum hydroxide/magnesium hydroxide/simethicone Suspension 30 milliLiter(s) Oral four times a day PRN  ondansetron Injectable 4 milliGRAM(s) IV Push every 6 hours PRN  polyethylene glycol 3350 17 Gram(s) Oral daily PRN  senna 2 Tablet(s) Oral at bedtime PRN  magnesium hydroxide Suspension 30 milliLiter(s) Oral daily PRN  docusate sodium 100 milliGRAM(s) Oral three times a day  dextrose 5% + sodium chloride 0.45%. 1000 milliLiter(s) IV Continuous <Continuous>  traMADol 25 milliGRAM(s) Oral three times a day PRN      Objective:    09-04 @ 07:01  -  09-05 @ 07:00  --------------------------------------------------------  IN: 2850 mL / OUT: 1955 mL / NET: 895 mL    09-05 @ 07:01  -  09-05 @ 16:15  --------------------------------------------------------  IN: 150 mL / OUT: 0 mL / NET: 150 mL      T(C): 38.3 (09-05-17 @ 14:39), Max: 38.3 (09-05-17 @ 14:39)  HR: 89 (09-05-17 @ 14:39) (80 - 96)  BP: 130/77 (09-05-17 @ 14:39) (108/74 - 184/84)  RR: 18 (09-05-17 @ 14:39) (14 - 21)  SpO2: 93% (09-05-17 @ 14:39) (90% - 99%)  Wt(kg): --      Physical Exam:  General: well developed well nourished, in no acute distress  Eyes: sclera anicteric, pupils equal and reactive to light  ENMT: buccal mucosa moist, pharynx not injected  Neck: supple, trachea midline  Lungs: clear, no wheeze/rhonchi  Cardiovascular: regular rate and rhythm, S1 S2  Abdomen: soft, nontender, no organomegaly present, bowel sounds normal  Neurological: alert and oriented x3, Cranial Nerves II-XII grossly intact  Extremities: Left hip drsg stable      LABS:                          10.3   7.6   )-----------( 292      ( 05 Sep 2017 07:34 )             31.8       09-05    142  |  102  |  24<H>  ----------------------------<  123<H>  3.9   |  33<H>  |  1.70<H>    Ca    8.5      05 Sep 2017 07:34    Vancomycin Level, Trough: 13.7 ug/mL (09-05 @ 12:13)    ABG - ( 05 Sep 2017 10:41 )  pH: x     /  pCO2: 58    /  pO2: 61    / HCO3: 31    / Base Excess: 8.4   /  SaO2: 91          RECENT CULTURES:  Culture - Other (09.02.17 @ 12:35)    -  Ampicillin: R >16    -  Ampicillin/Sulbactam: R >16/8    -  Ceftriaxone: R 32    -  Ciprofloxacin: S <=0.5    -  Ciprofloxacin: S <=0.5    -  Aztreonam: R >16    -  Aztreonam: R >16    -  Cefazolin: R >16    -  Gentamicin: S 4    -  Levofloxacin: S <=1    -  Levofloxacin: S <=1    -  Meropenem: S <=1    -  Meropenem: S <=1    -  Tobramycin: S 4    -  Trimethoprim/Sulfamethoxazole: S <=0.5/9.5    -  Cefepime: I 16    -  Cefepime: S 8    -  Tobramycin: S <=2    -  Amikacin: S <=8    -  Amikacin: S <=8    -  Cefoxitin: R >16    -  Ceftazidime: R >16    -  Ceftazidime: R >16    -  Ertapenem: S <=0.5    -  Gentamicin: S <=1    -  Imipenem: S <=1    -  Imipenem: S <=1    -  Piperacillin/Tazobactam: R >64    -  Piperacillin/Tazobactam: R >64    Specimen Source: .Other Other, post op left hip    Culture Results:   Numerous Serratia marcescens  Numerous Pseudomonas aeruginosa  Few Coag Negative Staphylococcus    Organism Identification: Serratia marcescens  Pseudomonas aeruginosa    Organism: Serratia marcescens    Organism: Pseudomonas aeruginosa    Method Type: KASH    Method Type: KASH      Culture - Blood (09.02.17 @ 12:00)    Specimen Source: .Blood Blood    Culture Results:   No growth to date.        RADIOLOGY & ADDITIONAL STUDIES:  EXAM:  XR HIP WITH PELV 1V LT                          PROCEDURE DATE:  09/02/2017      INTERPRETATION:  Single frontal view the pelvis and 2 views of the left   hip    Clinical indication: Left hip soreness, evaluate for osteomyelitis    FINDINGS: The bones are osteopenic. The patient is status post total left   hip replacement. There is a minimally displaced periprosthetic fracture   along the medial aspect of the proximal left femoral shaft. There are   marked degenerative changes of the right hip with obliteration of the   joint space and degenerative changes of the lower lumbar spine. Calcified   bladder stones are noted. Surgical clips overlie the pubic symphysis and   left greater trochanter.    IMPRESSION: Minimally displaced periprosthetic fracture along the medial   aspect of the proximal left femoral shaft.      Additional findings as above.    Findings were discussed with Dr. Anders of the ED at the time of this   dictation.      Assessment :   84 year old male sp left hip ORIF on 8/17/17 admitted for Left hip surgical wound infection  POD 1 Revision of right total hip arthroplasty by posterior approach  09/04/2017    Left, I&D of anterior incision, explantation hardware, insertion cement spacer.  CKD  Surface culture noted  OR cultures in progress    Plan :   Dc Zosyn   Start Meropenam  Cont Vancomycin pending OR cultures  Monitor renal fx closely while on Vancomycin  Aspiration precautions  Encourage pulm toileting    > 35 minutes were spent in direct patient care reviewing notes, medications ,labs data/ imaging , discussion with multidisciplinary team.    Thank you for allowing me to participate in care of your patient .    Zurdo Braden MD  Infectious Disease  [phone]

## 2017-09-05 NOTE — CONSULT NOTE ADULT - SUBJECTIVE AND OBJECTIVE BOX
Patient is a 84y old  Male who presents with a chief complaint of "left hip" (02 Sep 2017 14:21)      HPI:  83 y/o male with pmh of OA, seasonal  rhinitis, Ca Prostate, left anterior THR on aug17th  is admitted with c/o pain in left hip, +redness around surgical site for few days. Pt was started on po antibiotics with no improvement of symptoms so admitted for further evaluation. Xray revealed periprosthetic fracture.  pt is evaluated by ortho in ED, needs debridement and revision of join,. Pt wants to think for surgery. ( is covering ).was on Eliquis for DVT prophylaxis, last dose is takenon (02 Sep 2017 14:18)  Neurology consult was called for AMS/Confusion.    PAST MEDICAL & SURGICAL HISTORY:  Hearing loss  Osteoarthritis  Fluid retention in legs  Seasonal allergies  Prostate cancer: 48 treatments of RT  History of hip replacement, total, left  S/P inguinal hernia repair: 1961  S/P cholecystectomy: 1985  History of appendectomy    MEDICATIONS  (STANDING):  influenza   Vaccine 0.5 milliLiter(s) IntraMuscular once  piperacillin/tazobactam IVPB. 3.375 Gram(s) IV Intermittent every 8 hours  vancomycin  IVPB 1000 milliGRAM(s) IV Intermittent every 24 hours  apixaban 2.5 milliGRAM(s) Oral every 12 hours  doxazosin 4 milliGRAM(s) Oral at bedtime  loratadine 10 milliGRAM(s) Oral daily  bicalutamide 50 milliGRAM(s) Oral daily  metoprolol succinate ER 25 milliGRAM(s) Oral daily  pantoprazole    Tablet 40 milliGRAM(s) Oral before breakfast  docusate sodium 100 milliGRAM(s) Oral three times a day  dextrose 5% + sodium chloride 0.45%. 1000 milliLiter(s) (60 mL/Hr) IV Continuous <Continuous>    MEDICATIONS  (PRN):  aluminum hydroxide/magnesium hydroxide/simethicone Suspension 30 milliLiter(s) Oral four times a day PRN Indigestion  ondansetron Injectable 4 milliGRAM(s) IV Push every 6 hours PRN Nausea and/or Vomiting  polyethylene glycol 3350 17 Gram(s) Oral daily PRN Constipation  senna 2 Tablet(s) Oral at bedtime PRN Constipation  magnesium hydroxide Suspension 30 milliLiter(s) Oral daily PRN Constipation      Allergies    No Known Allergies    Intolerances    SOCIAL HISTORY:    Ex Smoker. Quite in past.  Pt does drink socially.    FAMILY HISTORY:  Family history of coronary artery disease (Father)  Family history of rheumatic heart disease (Mother)      REVIEW OF SYSTEMS:    CONSTITUTIONAL: No fever  EYES: No eye pain,   ENMT:  No sinus or throat pain  NECK: No pain or stiffness  RESPIRATORY: No cough, No hemoptysis; No shortness of breath  CARDIOVASCULAR: No acute chest pain, palpitations,  or leg swelling  GASTROINTESTINAL: No abdominal pain. No nausea, vomiting, or hematemesis;  No melena or hematochezia.  GENITOURINARY: No  hematuria, or incontinence  MUSCULOSKELETAL: No joint swelling; No extremity pain  SKIN: No itching, rashes, or lesions   LYMPH NODES: No enlarged glands  NEUROLOGICAL: No headaches, memory loss,   PSYCHIATRIC: No depression, anxiety, mood swings, or difficulty sleeping  ENDOCRINE: No heat or cold intolerance;   HEME/LYMPH: No easy bruising, or bleeding gums  Allergy/Immunology. No medication allergy. No seasonal allergies.    PHYSICAL EXAM:  Vital Signs Last 24 Hrs  T(F): 100.9 (09-05-17 @ 14:39)  HR: 89 (09-05-17 @ 14:39)  BP: 130/77 (09-05-17 @ 14:39)  RR: 18 (09-05-17 @ 14:39)    GENERAL: NAD, well-groomed, well-developed  HEAD:  Atraumatic, Normocephalic  EYES: EOMI, PERRLA, conjunctiva and sclera clear  NECK: Supple, No JVD, thyroid non-palpable    On Neurological Examination:    Mental Status - Pt is alert, awake, Able to say month/year correctly. Higher functions are intact. Follows commands well and able to answer questions appropriately.    Speech -  Normal. Pt has no aphasia.    Cranial Nerves - Pupils 3 mm equal and reactive to light, extraocular eye movements intact. Pt has no visual field deficit. Pt has no facial asymmetry. Tongue - is in midline.    Motor Exam - Moves all extremities equally. No asymmetry is seen.      Sensory Exam - Pin prick, temperature, joint position and vibration are intact on either side. Pt withdraws all extremities equally on stimulation. No asymmetry seen. No complaints of tingling, numbness.    Gait - Not tested currently.    Deep tendon Reflexes - 2 plus all over.    Coordination - Fine finger movements are normal on both sides. Finger to nose is also normal on both sides.      Neck Supple -  Yes.    LABS:                        10.3   7.6   )-----------( 292      ( 05 Sep 2017 07:34 )             31.8     09-05    142  |  102  |  24<H>  ----------------------------<  123<H>  3.9   |  33<H>  |  1.70<H>    Ca    8.5      05 Sep 2017 07:34    RADIOLOGY & ADDITIONAL STUDIES:      < from: CT Head No Cont (09.05.17 @ 10:55) >  EXAM:  CT BRAIN                                  PROCEDURE DATE:  09/05/2017          INTERPRETATION:  CT BRAIN    HISTORY:  Alteration of  Consciousness    TECHNIQUE: CT of the head was performed without intravenous contrast.   Multiplanar reformatted images were then generated from the axial   acquired data.  This study was performed using automatic exposure control   (radiation dose reduction software) to obtain a diagnostic image quality   scan with patient dose as low as reasonably achievable.    COMPARISON: None available    FINDINGS:     INTRACRANIAL FINDINGS: There is age-related atrophy and chronic   microvascular ischemic change. No evidence for acute territorial infarct,   mass lesion, mass effect, or recent hemorrhage. There is no abnormal   extra axial collection.    EXTRACRANIAL FINDINGS: No extracalvarial soft tissue swelling. No   depressed calvarial fracture. The orbital contents are unremarkable. The   visualized paranasal sinuses are well aerated. The mastoid air cells are   clear.    IMPRESSION:     No acute intracranial hemorrhage, mass effect, or midline shift.       AALIYAH MARCUS M.D., ATTENDING RADIOLOGIST  This document has been electronically signed. Sep  5 2017 11:24AM              < end of copied text >

## 2017-09-05 NOTE — CONSULT NOTE ADULT - PROBLEM SELECTOR RECOMMENDATION 5
at risk for DEJA  discussed with family  work up as outpatient  monitor sat  supp o2  keep sat > 90 pct  NIPPV ordered for PRN use  will hold opioids for now  discussed course of care with family, if condition deteriorates will consider SPCU transfer

## 2017-09-05 NOTE — OCCUPATIONAL THERAPY INITIAL EVALUATION ADULT - ADDITIONAL COMMENTS
Lives alone in a nursing home home. No steps. Stall shower. Has a RW and SC. Pt was at [facility] for inpatient rehab s/p Left anterior THR 08/17/17. Prior to Left THR patient lived alone in a custodial home. No steps. Stall shower. Pt owns a RW and SC. Pt is [place] and wears bilateral hearing aides.

## 2017-09-05 NOTE — CONSULT NOTE ADULT - ATTENDING COMMENTS
Advanced care planning was discussed with family. Pt is full code.  Pain is accessed and addressed. Pain is being addressed.  Pt was screened for signs of clinical depression. No signs of depression.  Risk of falls accessed. Fall prevention and plan of care is in place.  Pt is screened for tobacco and alcohol use. Pt doesn't smoke or drink.  Use of narcotic pain meds was discussed. Pt is advised to use narcotic meds wisely and to refrain from over using them.  Plan of care was discussed with patient family. Questions answered.  Would continue to follow.

## 2017-09-05 NOTE — PROGRESS NOTE ADULT - SUBJECTIVE AND OBJECTIVE BOX
Chief Complaint: Periprosthetic fracture, infection    Interval Events: Underwent surgery yesterday. Sleeping this morning.    Review of Systems:  General: No fevers, chills, weight loss or gain  Skin: No rashes, color changes  Cardiovascular: No chest pain, orthopnea  Respiratory: No shortness of breath, cough  Gastrointestinal: No nausea, abdominal pain  Genitourinary: No incontinence, pain with urination  Musculoskeletal: No pain, swelling, decreased range of motion  Neurological: No headache, weakness  Psychiatric: No depression, anxiety  Endocrine: No weight loss or gain, increased thirst  All other systems are comprehensively negative.    Physical Exam:  Vitals:        Vital Signs Last 24 Hrs  T(C): 36.6 (05 Sep 2017 09:32), Max: 37.2 (05 Sep 2017 05:02)  T(F): 97.9 (05 Sep 2017 09:32), Max: 98.9 (05 Sep 2017 05:02)  HR: 89 (05 Sep 2017 09:32) (75 - 96)  BP: 129/64 (05 Sep 2017 09:32) (108/74 - 184/84)  BP(mean): --  RR: 18 (05 Sep 2017 09:32) (14 - 21)  SpO2: 90% (05 Sep 2017 09:32) (90% - 99%)  General: NAD  Neck: No JVD, no carotid bruit  Lungs: CTAB  CV: RRR, nl S1/S2, no M/R/G  Abdomen: S/NT/ND, +BS  Extremities: No LE edema, no cyanosis    Labs:                        10.3   7.6   )-----------( 292      ( 05 Sep 2017 07:34 )             31.8     09-05    142  |  102  |  24<H>  ----------------------------<  123<H>  3.9   |  33<H>  |  1.70<H>    Ca    8.5      05 Sep 2017 07:34

## 2017-09-06 DIAGNOSIS — R06.89 OTHER ABNORMALITIES OF BREATHING: ICD-10-CM

## 2017-09-06 LAB
ANION GAP SERPL CALC-SCNC: 6 MMOL/L — SIGNIFICANT CHANGE UP (ref 5–17)
BUN SERPL-MCNC: 25 MG/DL — HIGH (ref 7–23)
CALCIUM SERPL-MCNC: 8.5 MG/DL — SIGNIFICANT CHANGE UP (ref 8.4–10.5)
CHLORIDE SERPL-SCNC: 102 MMOL/L — SIGNIFICANT CHANGE UP (ref 96–108)
CO2 SERPL-SCNC: 32 MMOL/L — HIGH (ref 22–31)
CREAT SERPL-MCNC: 1.65 MG/DL — HIGH (ref 0.5–1.3)
GLUCOSE SERPL-MCNC: 125 MG/DL — HIGH (ref 70–99)
HCT VFR BLD CALC: 28.7 % — LOW (ref 39–50)
HGB BLD-MCNC: 9.7 G/DL — LOW (ref 13–17)
MCHC RBC-ENTMCNC: 31.2 PG — SIGNIFICANT CHANGE UP (ref 27–34)
MCHC RBC-ENTMCNC: 33.6 GM/DL — SIGNIFICANT CHANGE UP (ref 32–36)
MCV RBC AUTO: 92.8 FL — SIGNIFICANT CHANGE UP (ref 80–100)
PLATELET # BLD AUTO: 262 K/UL — SIGNIFICANT CHANGE UP (ref 150–400)
POTASSIUM SERPL-MCNC: 3.4 MMOL/L — LOW (ref 3.5–5.3)
POTASSIUM SERPL-SCNC: 3.4 MMOL/L — LOW (ref 3.5–5.3)
RBC # BLD: 3.1 M/UL — LOW (ref 4.2–5.8)
RBC # FLD: 11.9 % — SIGNIFICANT CHANGE UP (ref 10.3–14.5)
SODIUM SERPL-SCNC: 140 MMOL/L — SIGNIFICANT CHANGE UP (ref 135–145)
WBC # BLD: 8.3 K/UL — SIGNIFICANT CHANGE UP (ref 3.8–10.5)
WBC # FLD AUTO: 8.3 K/UL — SIGNIFICANT CHANGE UP (ref 3.8–10.5)

## 2017-09-06 PROCEDURE — 99233 SBSQ HOSP IP/OBS HIGH 50: CPT

## 2017-09-06 RX ORDER — POTASSIUM CHLORIDE 20 MEQ
40 PACKET (EA) ORAL ONCE
Qty: 0 | Refills: 0 | Status: COMPLETED | OUTPATIENT
Start: 2017-09-06 | End: 2017-09-06

## 2017-09-06 RX ADMIN — Medication 40 MILLIEQUIVALENT(S): at 11:29

## 2017-09-06 RX ADMIN — MEROPENEM 200 MILLIGRAM(S): 1 INJECTION INTRAVENOUS at 05:39

## 2017-09-06 RX ADMIN — APIXABAN 2.5 MILLIGRAM(S): 2.5 TABLET, FILM COATED ORAL at 08:30

## 2017-09-06 RX ADMIN — LORATADINE 10 MILLIGRAM(S): 10 TABLET ORAL at 11:30

## 2017-09-06 RX ADMIN — BICALUTAMIDE 50 MILLIGRAM(S): 50 TABLET, FILM COATED ORAL at 11:32

## 2017-09-06 RX ADMIN — Medication 100 MILLIGRAM(S): at 21:38

## 2017-09-06 RX ADMIN — Medication 25 MILLIGRAM(S): at 05:39

## 2017-09-06 RX ADMIN — Medication 4 MILLIGRAM(S): at 21:38

## 2017-09-06 RX ADMIN — APIXABAN 2.5 MILLIGRAM(S): 2.5 TABLET, FILM COATED ORAL at 21:38

## 2017-09-06 RX ADMIN — MEROPENEM 200 MILLIGRAM(S): 1 INJECTION INTRAVENOUS at 18:25

## 2017-09-06 RX ADMIN — SENNA PLUS 2 TABLET(S): 8.6 TABLET ORAL at 21:38

## 2017-09-06 RX ADMIN — TRAMADOL HYDROCHLORIDE 25 MILLIGRAM(S): 50 TABLET ORAL at 22:30

## 2017-09-06 RX ADMIN — TRAMADOL HYDROCHLORIDE 25 MILLIGRAM(S): 50 TABLET ORAL at 21:38

## 2017-09-06 RX ADMIN — Medication 100 MILLIGRAM(S): at 18:23

## 2017-09-06 RX ADMIN — Medication 250 MILLIGRAM(S): at 11:31

## 2017-09-06 RX ADMIN — TRAMADOL HYDROCHLORIDE 25 MILLIGRAM(S): 50 TABLET ORAL at 12:15

## 2017-09-06 RX ADMIN — Medication 100 MILLIGRAM(S): at 05:39

## 2017-09-06 RX ADMIN — PANTOPRAZOLE SODIUM 40 MILLIGRAM(S): 20 TABLET, DELAYED RELEASE ORAL at 05:39

## 2017-09-06 RX ADMIN — TRAMADOL HYDROCHLORIDE 25 MILLIGRAM(S): 50 TABLET ORAL at 11:29

## 2017-09-06 RX ADMIN — SODIUM CHLORIDE 60 MILLILITER(S): 9 INJECTION, SOLUTION INTRAVENOUS at 11:30

## 2017-09-06 NOTE — PROGRESS NOTE ADULT - SUBJECTIVE AND OBJECTIVE BOX
CRISTINA MCCLELLAN is a yMale , patient examined and chart reviewed.    INTERVAL HPI/ OVERNIGHT EVENTS:   No events, Low grade temps.    PAST MEDICAL & SURGICAL HISTORY:  Hearing loss  Osteoarthritis  Fluid retention in legs  Seasonal allergies  Prostate cancer: 48 treatments of RT  History of hip replacement, total, left  S/P inguinal hernia repair: 1961  S/P cholecystectomy: 1985  History of appendectomy      For details regarding the patient's social history, family history, and other miscellaneous elements, please refer the initial infectious diseases consultation and/or the admitting history and physical examination for this admission.      ROS:  CONSTITUTIONAL: + fever No chills,  EYES:  Negative  blurry vision or double vision  CARDIOVASCULAR:  Negative for chest pain or palpitations  RESPIRATORY:  Negative for cough, wheezing, or SOB   GASTROINTESTINAL:  Negative for nausea, vomiting, diarrhea, constipation, or abdominal pain  GENITOURINARY:  Negative frequency, urgency or dysuria  NEUROLOGIC:  No headache, confusion, dizziness, lightheadedness  All other systems were reviewed and are negative     Current inpatient medications :    ANTIBIOTICS/RELEVANT:  vancomycin  IVPB 1000 milliGRAM(s) IV Intermittent every 24 hours  meropenem IVPB 1000 milliGRAM(s) IV Intermittent every 12 hours  meropenem IVPB   IV Intermittent       apixaban 2.5 milliGRAM(s) Oral every 12 hours  doxazosin 4 milliGRAM(s) Oral at bedtime  loratadine 10 milliGRAM(s) Oral daily  bicalutamide 50 milliGRAM(s) Oral daily  metoprolol succinate ER 25 milliGRAM(s) Oral daily  pantoprazole    Tablet 40 milliGRAM(s) Oral before breakfast  aluminum hydroxide/magnesium hydroxide/simethicone Suspension 30 milliLiter(s) Oral four times a day PRN  ondansetron Injectable 4 milliGRAM(s) IV Push every 6 hours PRN  polyethylene glycol 3350 17 Gram(s) Oral daily PRN  bisacodyl Suppository 10 milliGRAM(s) Rectal daily PRN  senna 2 Tablet(s) Oral at bedtime PRN  magnesium hydroxide Suspension 30 milliLiter(s) Oral daily PRN  docusate sodium 100 milliGRAM(s) Oral three times a day  dextrose 5% + sodium chloride 0.45%. 1000 milliLiter(s) IV Continuous <Continuous>  traMADol 25 milliGRAM(s) Oral three times a day PRN      Objective:    09-05 @ 07:01 - 09-06 @ 07:00  --------------------------------------------------------  IN: 1410 mL / OUT: 600 mL / NET: 810 mL    09-06 @ 07:01  -  09-06 @ 16:38  --------------------------------------------------------  IN: 490 mL / OUT: 0 mL / NET: 490 mL      T(C): 36.9 (09-06-17 @ 14:10), Max: 38.3 (09-06-17 @ 05:14)  HR: 83 (09-06-17 @ 14:10) (81 - 84)  BP: 123/70 (09-06-17 @ 14:10) (109/62 - 123/70)  RR: 18 (09-06-17 @ 14:10) (18 - 20)  SpO2: 98% (09-06-17 @ 14:10) (90% - 98%)  Wt(kg): --      Physical Exam:  General: No acute distress  Eyes: sclera anicteric, pupils equal and reactive to light  ENMT: buccal mucosa moist, pharynx not injected  Neck: supple, trachea midline  Lungs: clear, no wheeze/rhonchi  Cardiovascular: regular rate and rhythm, S1 S2  Abdomen: soft, nontender, no organomegaly present, bowel sounds normal  Neurological: alert and oriented x3, Cranial Nerves II-XII grossly intact  Extremities: no cyanosis/clubbing/edema  Left hip drsg c/d/i    LABS:                          9.7    8.3   )-----------( 262      ( 06 Sep 2017 07:33 )             28.7       09-06    140  |  102  |  25<H>  ----------------------------<  125<H>  3.4<L>   |  32<H>  |  1.65<H>    Ca    8.5      06 Sep 2017 07:33      Vancomycin Level, Trough: 13.7 ug/mL (09-05 @ 12:13)    ABG - ( 05 Sep 2017 10:41 )  pH: x     /  pCO2: 58    /  pO2: 61    / HCO3: 31    / Base Excess: 8.4   /  SaO2: 91            RECENT CULTURES:  Culture - Other (09.02.17 @ 12:35)    -  Ampicillin: R >16    -  Ampicillin/Sulbactam: R >16/8    -  Ceftriaxone: R 32    -  Ciprofloxacin: S <=0.5    -  Ciprofloxacin: S <=0.5    -  Aztreonam: R >16    -  Aztreonam: R >16    -  Cefazolin: R >16    -  Gentamicin: S 4    -  Levofloxacin: S <=1    -  Levofloxacin: S <=1    -  Meropenem: S <=1    -  Meropenem: S <=1    -  Tobramycin: S 4    -  Trimethoprim/Sulfamethoxazole: S <=0.5/9.5    -  Cefepime: I 16    -  Cefepime: S 8    -  Tobramycin: S <=2    -  Amikacin: S <=8    -  Amikacin: S <=8    -  Cefoxitin: R >16    -  Ceftazidime: R >16    -  Ceftazidime: R >16    -  Ertapenem: S <=0.5    -  Gentamicin: S <=1    -  Imipenem: S <=1    -  Imipenem: S <=1    -  Piperacillin/Tazobactam: R >64    -  Piperacillin/Tazobactam: R >64    Specimen Source: .Other Other, post op left hip    Culture Results:   Numerous Serratia marcescens  Numerous Pseudomonas aeruginosa  Few Coag Negative Staphylococcus    Organism Identification: Serratia marcescens  Pseudomonas aeruginosa    Organism: Serratia marcescens    Organism: Pseudomonas aeruginosa    Method Type: KASH    Method Type: KASH        RADIOLOGY & ADDITIONAL STUDIES:    EXAM:  XR HIP WITH PELV 1V LT                          PROCEDURE DATE:  09/02/2017      INTERPRETATION:  Single frontal view the pelvis and 2 views of the left   hip    Clinical indication: Left hip soreness, evaluate for osteomyelitis    FINDINGS: The bones are osteopenic. The patient is status post total left   hip replacement. There is a minimally displaced periprosthetic fracture   along the medial aspect of the proximal left femoral shaft. There are   marked degenerative changes of the right hip with obliteration of the   joint space and degenerative changes of the lower lumbar spine. Calcified   bladder stones are noted. Surgical clips overlie the pubic symphysis and   left greater trochanter.    IMPRESSION: Minimally displaced periprosthetic fracture along the medial   aspect of the proximal left femoral shaft.      Additional findings as above.    Findings were discussed with Dr. Anders of the ED at the time of this   dictation.      Assessment :   84 year old male sp left hip ORIF on 8/17/17 admitted for Left hip surgical wound infection  POD 1 Revision of right total hip arthroplasty by posterior approach  09/04/2017    Left, I&D of anterior incision, explantation hardware, insertion cement spacer.  CKD  Surface culture noted  Looks like no OR cultures sent     Plan :   Meropenam and Vancomycin  Monitor renal fx closely while on Vancomycin  Aspiration precautions  Encourage pulm toileting  PICC line  Will need 6 weeks of IV antibx    I have discussed the above plan of care with patient in detail. He expressed understanding of the  treatment plan . Risks, benefits and alternatives discussed in detail.   I have asked if he has any questions or concerns and appropriately addressed them to the best of my ability .    > 35 minutes were spent in direct patient care reviewing notes, medications ,labs data/ imaging , discussion with multidisciplinary team.    Thank you for allowing me to participate in care of your patient .    Zurdo Braden MD  Infectious Disease  [phone] CRISTINA MCCLELLAN is a yMale , patient examined and chart reviewed.    INTERVAL HPI/ OVERNIGHT EVENTS:   No events, Low grade temps.    PAST MEDICAL & SURGICAL HISTORY:  Hearing loss  Osteoarthritis  Fluid retention in legs  Seasonal allergies  Prostate cancer: 48 treatments of RT  History of hip replacement, total, left  S/P inguinal hernia repair: 1961  S/P cholecystectomy: 1985  History of appendectomy      For details regarding the patient's social history, family history, and other miscellaneous elements, please refer the initial infectious diseases consultation and/or the admitting history and physical examination for this admission.      ROS:  CONSTITUTIONAL: + fever No chills,  EYES:  Negative  blurry vision or double vision  CARDIOVASCULAR:  Negative for chest pain or palpitations  RESPIRATORY:  Negative for cough, wheezing, or SOB   GASTROINTESTINAL:  Negative for nausea, vomiting, diarrhea, constipation, or abdominal pain  GENITOURINARY:  Negative frequency, urgency or dysuria  NEUROLOGIC:  No headache, confusion, dizziness, lightheadedness  All other systems were reviewed and are negative     Current inpatient medications :    ANTIBIOTICS/RELEVANT:  vancomycin  IVPB 1000 milliGRAM(s) IV Intermittent every 24 hours  meropenem IVPB 1000 milliGRAM(s) IV Intermittent every 12 hours  meropenem IVPB   IV Intermittent       apixaban 2.5 milliGRAM(s) Oral every 12 hours  doxazosin 4 milliGRAM(s) Oral at bedtime  loratadine 10 milliGRAM(s) Oral daily  bicalutamide 50 milliGRAM(s) Oral daily  metoprolol succinate ER 25 milliGRAM(s) Oral daily  pantoprazole    Tablet 40 milliGRAM(s) Oral before breakfast  aluminum hydroxide/magnesium hydroxide/simethicone Suspension 30 milliLiter(s) Oral four times a day PRN  ondansetron Injectable 4 milliGRAM(s) IV Push every 6 hours PRN  polyethylene glycol 3350 17 Gram(s) Oral daily PRN  bisacodyl Suppository 10 milliGRAM(s) Rectal daily PRN  senna 2 Tablet(s) Oral at bedtime PRN  magnesium hydroxide Suspension 30 milliLiter(s) Oral daily PRN  docusate sodium 100 milliGRAM(s) Oral three times a day  dextrose 5% + sodium chloride 0.45%. 1000 milliLiter(s) IV Continuous <Continuous>  traMADol 25 milliGRAM(s) Oral three times a day PRN      Objective:    09-05 @ 07:01 - 09-06 @ 07:00  --------------------------------------------------------  IN: 1410 mL / OUT: 600 mL / NET: 810 mL    09-06 @ 07:01  -  09-06 @ 16:38  --------------------------------------------------------  IN: 490 mL / OUT: 0 mL / NET: 490 mL      T(C): 36.9 (09-06-17 @ 14:10), Max: 38.3 (09-06-17 @ 05:14)  HR: 83 (09-06-17 @ 14:10) (81 - 84)  BP: 123/70 (09-06-17 @ 14:10) (109/62 - 123/70)  RR: 18 (09-06-17 @ 14:10) (18 - 20)  SpO2: 98% (09-06-17 @ 14:10) (90% - 98%)  Wt(kg): --      Physical Exam:  General: No acute distress  Eyes: sclera anicteric, pupils equal and reactive to light  ENMT: buccal mucosa moist, pharynx not injected  Neck: supple, trachea midline  Lungs: clear, no wheeze/rhonchi  Cardiovascular: regular rate and rhythm, S1 S2  Abdomen: soft, nontender, no organomegaly present, bowel sounds normal  Neurological: no focal deficit  Extremities: no cyanosis/clubbing/edema  Left hip drsg c/d/i    LABS:                          9.7    8.3   )-----------( 262      ( 06 Sep 2017 07:33 )             28.7       09-06    140  |  102  |  25<H>  ----------------------------<  125<H>  3.4<L>   |  32<H>  |  1.65<H>    Ca    8.5      06 Sep 2017 07:33      Vancomycin Level, Trough: 13.7 ug/mL (09-05 @ 12:13)    ABG - ( 05 Sep 2017 10:41 )  pH: x     /  pCO2: 58    /  pO2: 61    / HCO3: 31    / Base Excess: 8.4   /  SaO2: 91            RECENT CULTURES:  Culture - Other (09.02.17 @ 12:35)    -  Ampicillin: R >16    -  Ampicillin/Sulbactam: R >16/8    -  Ceftriaxone: R 32    -  Ciprofloxacin: S <=0.5    -  Ciprofloxacin: S <=0.5    -  Aztreonam: R >16    -  Aztreonam: R >16    -  Cefazolin: R >16    -  Gentamicin: S 4    -  Levofloxacin: S <=1    -  Levofloxacin: S <=1    -  Meropenem: S <=1    -  Meropenem: S <=1    -  Tobramycin: S 4    -  Trimethoprim/Sulfamethoxazole: S <=0.5/9.5    -  Cefepime: I 16    -  Cefepime: S 8    -  Tobramycin: S <=2    -  Amikacin: S <=8    -  Amikacin: S <=8    -  Cefoxitin: R >16    -  Ceftazidime: R >16    -  Ceftazidime: R >16    -  Ertapenem: S <=0.5    -  Gentamicin: S <=1    -  Imipenem: S <=1    -  Imipenem: S <=1    -  Piperacillin/Tazobactam: R >64    -  Piperacillin/Tazobactam: R >64    Specimen Source: .Other Other, post op left hip    Culture Results:   Numerous Serratia marcescens  Numerous Pseudomonas aeruginosa  Few Coag Negative Staphylococcus    Organism Identification: Serratia marcescens  Pseudomonas aeruginosa    Organism: Serratia marcescens    Organism: Pseudomonas aeruginosa    Method Type: KASH    Method Type: KASH        RADIOLOGY & ADDITIONAL STUDIES:    EXAM:  XR HIP WITH PELV 1V LT                          PROCEDURE DATE:  09/02/2017      INTERPRETATION:  Single frontal view the pelvis and 2 views of the left   hip    Clinical indication: Left hip soreness, evaluate for osteomyelitis    FINDINGS: The bones are osteopenic. The patient is status post total left   hip replacement. There is a minimally displaced periprosthetic fracture   along the medial aspect of the proximal left femoral shaft. There are   marked degenerative changes of the right hip with obliteration of the   joint space and degenerative changes of the lower lumbar spine. Calcified   bladder stones are noted. Surgical clips overlie the pubic symphysis and   left greater trochanter.    IMPRESSION: Minimally displaced periprosthetic fracture along the medial   aspect of the proximal left femoral shaft.      Additional findings as above.    Findings were discussed with Dr. Anders of the ED at the time of this   dictation.      Assessment :   84 year old male sp left hip ORIF on 8/17/17 admitted for Left hip surgical wound infection  Sp Revision of right total hip arthroplasty by posterior approach  09/04/2017    Left, I&D of anterior incision, explantation hardware, insertion cement spacer.  CKD  Surface culture noted  Looks like no OR cultures sent     Plan :   Meropenam and Vancomycin  Monitor renal fx closely while on Vancomycin  Aspiration precautions  Encourage pulm toileting  PICC line  Will need 6 weeks of IV antibx    I have discussed the above plan of care with patient in detail. He expressed understanding of the  treatment plan . Risks, benefits and alternatives discussed in detail.   I have asked if he has any questions or concerns and appropriately addressed them to the best of my ability .    > 35 minutes were spent in direct patient care reviewing notes, medications ,labs data/ imaging , discussion with multidisciplinary team.    Thank you for allowing me to participate in care of your patient .    Zurdo Braden MD  Infectious Disease  [phone]

## 2017-09-06 NOTE — PROGRESS NOTE ADULT - PROBLEM SELECTOR PLAN 2
most likely due to Opioids, caution with Opioids, use tramadol PRN  monitor sat  NIPPV PRN  tele monitoring

## 2017-09-06 NOTE — PROGRESS NOTE ADULT - SUBJECTIVE AND OBJECTIVE BOX
Chief Complaint: Periprosthetic fracture, infection    Interval Events: No events overnight.    Review of Systems:  General: No fevers, chills, weight loss or gain  Skin: No rashes, color changes  Cardiovascular: No chest pain, orthopnea  Respiratory: No shortness of breath, cough  Gastrointestinal: No nausea, abdominal pain  Genitourinary: No incontinence, pain with urination  Musculoskeletal: No pain, swelling, decreased range of motion  Neurological: No headache, weakness  Psychiatric: No depression, anxiety  Endocrine: No weight loss or gain, increased thirst  All other systems are comprehensively negative.    Physical Exam:  Vital Signs Last 24 Hrs  T(C): 36.7 (06 Sep 2017 10:24), Max: 38.3 (05 Sep 2017 14:39)  T(F): 98.1 (06 Sep 2017 10:24), Max: 100.9 (05 Sep 2017 14:39)  HR: 84 (06 Sep 2017 10:24) (81 - 89)  BP: 109/62 (06 Sep 2017 10:24) (109/62 - 130/77)  BP(mean): --  RR: 18 (06 Sep 2017 10:24) (18 - 20)  SpO2: 96% (06 Sep 2017 10:24) (90% - 96%)  General: NAD  Neck: No JVD, no carotid bruit  Lungs: CTAB  CV: RRR, nl S1/S2, no M/R/G  Abdomen: S/NT/ND, +BS  Extremities: No LE edema, no cyanosis    Labs:             09-06    140  |  102  |  25<H>  ----------------------------<  125<H>  3.4<L>   |  32<H>  |  1.65<H>    Ca    8.5      06 Sep 2017 07:33                          9.7    8.3   )-----------( 262      ( 06 Sep 2017 07:33 )             28.7

## 2017-09-06 NOTE — PROGRESS NOTE ADULT - SUBJECTIVE AND OBJECTIVE BOX
Neurology Follow up note    CRISTINA MCCLELLAN 84y Male    HPI:  85 y/o male with pmh of OA, seasonal  rhinitis, Ca Prostate, left anterior THR on aug17th  is admitted with c/o pain in left hip, +redness around surgical site for few days. Pt was started on po antibiotics with no improvement of symptoms so admitted for further evaluation. Xray revealed periprosthetic fracture.  pt is evaluated by ortho in ED, needs debridement and revision of join,. Pt wants to think for surgery. ( is covering ).was on Eliqis for DVT prophylaxis, last dose is taken yesterday. (02 Sep 2017 14:18)    Interval History -    Patient is seen, chart was reviewed and case was discussed with the treatment team.  Pt is not in any distress.   Lying on recliner chair comfortably.   No events reported overnight.     Vital Signs Last 24 Hrs  T(C): 36.7 (06 Sep 2017 10:24), Max: 38.3 (05 Sep 2017 14:39)  T(F): 98.1 (06 Sep 2017 10:24), Max: 100.9 (05 Sep 2017 14:39)  HR: 84 (06 Sep 2017 10:24) (81 - 89)  BP: 109/62 (06 Sep 2017 10:24) (109/62 - 130/77)  BP(mean): --  RR: 18 (06 Sep 2017 10:24) (18 - 20)  SpO2: 96% (06 Sep 2017 10:24) (90% - 96%)    REVIEW OF SYSTEMS:    CONSTITUTIONAL: No fever  EYES: No eye pain,   ENMT:  No sinus or throat pain  NECK: No pain or stiffness  RESPIRATORY: No cough, No hemoptysis; No shortness of breath  CARDIOVASCULAR: No acute chest pain, palpitations,  or leg swelling  GASTROINTESTINAL: No abdominal pain. No nausea, vomiting, or hematemesis;  No melena or hematochezia.  GENITOURINARY: No  hematuria, or incontinence  MUSCULOSKELETAL: No joint swelling; No extremity pain  SKIN: No itching, rashes, or lesions   LYMPH NODES: No enlarged glands  NEUROLOGICAL: No headaches, memory loss,   PSYCHIATRIC: No depression, anxiety, mood swings, or difficulty sleeping  ENDOCRINE: No heat or cold intolerance;   HEME/LYMPH: No easy bruising, or bleeding gums  Allergy/Immunology. No medication allergy. No seasonal allergies.    GENERAL: NAD, well-groomed, well-developed  HEAD:  Atraumatic, Normocephalic  EYES: EOMI, PERRLA, conjunctiva and sclera clear  NECK: Supple, No JVD, thyroid non-palpable    On Neurological Examination:    Mental Status - Pt is alert, awake, Able to say month/year correctly. [employer].  Higher functions are intact. Follows commands well and able to answer questions appropriately.    Speech -  Normal. Pt has no aphasia.    Cranial Nerves - Pupils 3 mm equal and reactive to light, extraocular eye movements intact. Pt has no visual field deficit. Pt has no facial asymmetry. Tongue - is in midline.    Motor Exam - Moves all extremities equally. No asymmetry is seen.      Sensory Exam - Pin prick, temperature, joint position and vibration are intact on either side. Pt withdraws all extremities equally on stimulation. No asymmetry seen. No complaints of tingling, numbness.    Gait - Not tested currently.    Deep tendon Reflexes - 2 plus all over.    Coordination - Fine finger movements are normal on both sides. Finger to nose is also normal on both sides.      Neck Supple -  Yes.    MEDICATIONS    influenza   Vaccine 0.5 milliLiter(s) IntraMuscular once  vancomycin  IVPB 1000 milliGRAM(s) IV Intermittent every 24 hours  apixaban 2.5 milliGRAM(s) Oral every 12 hours  doxazosin 4 milliGRAM(s) Oral at bedtime  loratadine 10 milliGRAM(s) Oral daily  bicalutamide 50 milliGRAM(s) Oral daily  metoprolol succinate ER 25 milliGRAM(s) Oral daily  pantoprazole    Tablet 40 milliGRAM(s) Oral before breakfast  aluminum hydroxide/magnesium hydroxide/simethicone Suspension 30 milliLiter(s) Oral four times a day PRN  ondansetron Injectable 4 milliGRAM(s) IV Push every 6 hours PRN  polyethylene glycol 3350 17 Gram(s) Oral daily PRN  bisacodyl Suppository 10 milliGRAM(s) Rectal daily PRN  senna 2 Tablet(s) Oral at bedtime PRN  magnesium hydroxide Suspension 30 milliLiter(s) Oral daily PRN  docusate sodium 100 milliGRAM(s) Oral three times a day  dextrose 5% + sodium chloride 0.45%. 1000 milliLiter(s) IV Continuous <Continuous>  traMADol 25 milliGRAM(s) Oral three times a day PRN  meropenem IVPB 1000 milliGRAM(s) IV Intermittent every 12 hours  meropenem IVPB   IV Intermittent     Allergies    No Known Allergies    LABS:    CBC Full  -  ( 06 Sep 2017 07:33 )  WBC Count : 8.3 K/uL  Hemoglobin : 9.7 g/dL  Hematocrit : 28.7 %  Platelet Count - Automated : 262 K/uL  Mean Cell Volume : 92.8 fl  Mean Cell Hemoglobin : 31.2 pg  Mean Cell Hemoglobin Concentration : 33.6 gm/dL    09-06    140  |  102  |  25<H>  ----------------------------<  125<H>  3.4<L>   |  32<H>  |  1.65<H>    Ca    8.5      06 Sep 2017 07:33    RADIOLOGY          < from: CT Head No Cont (09.05.17 @ 10:55) >  EXAM:  CT BRAIN                                  PROCEDURE DATE:  09/05/2017          INTERPRETATION:  CT BRAIN    HISTORY:  Alteration of  Consciousness    TECHNIQUE: CT of the head was performed without intravenous contrast.   Multiplanar reformatted images were then generated from the axial   acquired data.  This study was performed using automatic exposure control   (radiation dose reduction software) to obtain a diagnostic image quality   scan with patient dose as low as reasonably achievable.    COMPARISON: None available    FINDINGS:     INTRACRANIAL FINDINGS: There is age-related atrophy and chronic   microvascular ischemic change. No evidence for acute territorial infarct,   mass lesion, mass effect, or recent hemorrhage. There is no abnormal   extra axial collection.    EXTRACRANIAL FINDINGS: No extracalvarial soft tissue swelling. No   depressed calvarial fracture. The orbital contents are unremarkable. The   visualized paranasal sinuses are well aerated. The mastoid air cells are   clear.    IMPRESSION:     No acute intracranial hemorrhage, mass effect, or midline shift.       AALIYAH MARCUS M.D., ATTENDING RADIOLOGIST  This document has been electronically signed. Sep  5 2017 11:24AM              < end of copied text >

## 2017-09-06 NOTE — PROGRESS NOTE ADULT - ATTENDING COMMENTS
will give void trial today, if ok ten d/c rodrigues catheter. will give void trial today, if ok ten d/c rordigues catheter.  hypokalemia- replete K, check bmp in am.

## 2017-09-06 NOTE — PROGRESS NOTE ADULT - SUBJECTIVE AND OBJECTIVE BOX
POST OPERATIVE DAY #: 2   STATUS POST: Left hip I&D, Revision THR, placement of cement spacer                       SUBJECTIVE: Patient seen and examined. Resting comfortably in bed. Feeling depressed secondary to his current status.    Reported Pain score = 4    OBJECTIVE:     Vital Signs Last 24 Hrs  T(C): 38.3 (06 Sep 2017 05:14), Max: 38.3 (05 Sep 2017 14:39)  T(F): 100.9 (06 Sep 2017 05:14), Max: 100.9 (05 Sep 2017 14:39)  HR: 84 (06 Sep 2017 05:14) (81 - 89)  BP: 111/71 (06 Sep 2017 05:14) (111/67 - 130/77)  RR: 20 (06 Sep 2017 05:14) (18 - 20)  SpO2: 92% (06 Sep 2017 05:14) (90% - 93%)    Left hip:          Dressings removed: anterior incision clean and intact with scant serous drainage, sutures in place; posterior incision clean/dry/intact, staples in place  Abduction pillow and SCDs in place  Bilateral LEs:         Sensation:  intact to light touch          Motor exam:  5/5 dorsiflexion/plantarflexion/EHL          2+ DP pulses          calf supple, NT    LABS:                        9.7    8.3   )-----------( 262      ( 06 Sep 2017 07:33 )             28.7     09-06    140  |  102  |  25<H>  ----------------------------<  125<H>  3.4<L>   |  32<H>  |  1.65<H>    Ca    8.5      06 Sep 2017 07:33            MEDICATIONS:  Anticoagulation:  apixaban 2.5 milliGRAM(s) Oral every 12 hours      Pain medications:   traMADol 25 milliGRAM(s) Oral three times a day PRN        A/P : Patient stable s/p  Left hip I&D, Revision THR, placement of cement spacer, POD #2  -    Dressing changed  -    IV abx per ID, will follow cxs  -    Pain control  -    DVT ppx: Eliquis  -    Weight bearing status: WBAT   -    Continue total hip precautions  -    Physical Therapy  -    Occupational Therapy  -    Discharge plan: rehab when medically cleared POST OPERATIVE DAY #: 2   STATUS POST: Left hip I&D, Revision THR, placement of cement spacer                       SUBJECTIVE: Patient seen and examined. Resting comfortably in bed. Feeling depressed secondary to his current status.    Reported Pain score = 4    OBJECTIVE:     Vital Signs Last 24 Hrs  T(C): 38.3 (06 Sep 2017 05:14), Max: 38.3 (05 Sep 2017 14:39)  T(F): 100.9 (06 Sep 2017 05:14), Max: 100.9 (05 Sep 2017 14:39)  HR: 84 (06 Sep 2017 05:14) (81 - 89)  BP: 111/71 (06 Sep 2017 05:14) (111/67 - 130/77)  RR: 20 (06 Sep 2017 05:14) (18 - 20)  SpO2: 92% (06 Sep 2017 05:14) (90% - 93%)    Left hip:          Dressings removed: anterior incision clean and intact with scant serous drainage, sutures in place; posterior incision clean/dry/intact, staples in place  Abduction pillow and SCDs in place  Bilateral LEs:         Sensation:  intact to light touch          Motor exam:  5/5 dorsiflexion/plantarflexion/EHL          2+ DP pulses          calf supple, NT    LABS:                        9.7    8.3   )-----------( 262      ( 06 Sep 2017 07:33 )             28.7     09-06    140  |  102  |  25<H>  ----------------------------<  125<H>  3.4<L>   |  32<H>  |  1.65<H>    Ca    8.5      06 Sep 2017 07:33            MEDICATIONS:  Anticoagulation:  apixaban 2.5 milliGRAM(s) Oral every 12 hours      Pain medications:   traMADol 25 milliGRAM(s) Oral three times a day PRN        A/P : Patient stable s/p  Left hip I&D, Revision THR, placement of cement spacer, POD #2  -    Dressing changed  -    IV abx per ID, will follow cxs  -    Pain control  -    DVT ppx: Eliquis  -    Weight bearing status: TTWB  -    Continue total hip precautions  -    Physical Therapy  -    Occupational Therapy  -    Discharge plan: rehab when medically cleared

## 2017-09-06 NOTE — PROGRESS NOTE ADULT - SUBJECTIVE AND OBJECTIVE BOX
Date/Time Patient Seen:  		  Referring MD:   Data Reviewed	       Patient is a 84y old  Male who presents with a chief complaint of "left hip" (02 Sep 2017 14:21)  in bed  on oxygen  refused NIPPV  vs and meds reviewed  on ABX therapy      Subjective/HPI     PAST MEDICAL & SURGICAL HISTORY:  Hearing loss  Osteoarthritis  Fluid retention in legs  Seasonal allergies  Prostate cancer: 48 treatments of RT  History of hip replacement, total, left  S/P inguinal hernia repair: 1961  S/P cholecystectomy: 1985  History of appendectomy        Medication list         MEDICATIONS  (STANDING):  influenza   Vaccine 0.5 milliLiter(s) IntraMuscular once  vancomycin  IVPB 1000 milliGRAM(s) IV Intermittent every 24 hours  apixaban 2.5 milliGRAM(s) Oral every 12 hours  doxazosin 4 milliGRAM(s) Oral at bedtime  loratadine 10 milliGRAM(s) Oral daily  bicalutamide 50 milliGRAM(s) Oral daily  metoprolol succinate ER 25 milliGRAM(s) Oral daily  pantoprazole    Tablet 40 milliGRAM(s) Oral before breakfast  docusate sodium 100 milliGRAM(s) Oral three times a day  dextrose 5% + sodium chloride 0.45%. 1000 milliLiter(s) (60 mL/Hr) IV Continuous <Continuous>  meropenem IVPB 1000 milliGRAM(s) IV Intermittent every 12 hours  meropenem IVPB   IV Intermittent     MEDICATIONS  (PRN):  aluminum hydroxide/magnesium hydroxide/simethicone Suspension 30 milliLiter(s) Oral four times a day PRN Indigestion  ondansetron Injectable 4 milliGRAM(s) IV Push every 6 hours PRN Nausea and/or Vomiting  polyethylene glycol 3350 17 Gram(s) Oral daily PRN Constipation  bisacodyl Suppository 10 milliGRAM(s) Rectal daily PRN If no bowel movement by POD#2  senna 2 Tablet(s) Oral at bedtime PRN Constipation  magnesium hydroxide Suspension 30 milliLiter(s) Oral daily PRN Constipation  traMADol 25 milliGRAM(s) Oral three times a day PRN Moderate Pain (4 - 6)         Vitals log        ICU Vital Signs Last 24 Hrs  T(C): 38.3 (06 Sep 2017 05:14), Max: 38.3 (05 Sep 2017 14:39)  T(F): 100.9 (06 Sep 2017 05:14), Max: 100.9 (05 Sep 2017 14:39)  HR: 84 (06 Sep 2017 05:14) (81 - 89)  BP: 111/71 (06 Sep 2017 05:14) (111/67 - 130/77)  BP(mean): --  ABP: --  ABP(mean): --  RR: 20 (06 Sep 2017 05:14) (18 - 20)  SpO2: 92% (06 Sep 2017 05:14) (90% - 93%)           Input and Output:  I&O's Detail    04 Sep 2017 07:01  -  05 Sep 2017 07:00  --------------------------------------------------------  IN:    IV PiggyBack: 150 mL    lactated ringers.: 1600 mL    lactated ringers.: 1100 mL  Total IN: 2850 mL    OUT:    Estimated Blood Loss: 200 mL    Indwelling Catheter - Urethral: 920 mL    Voided: 835 mL  Total OUT: 1955 mL    Total NET: 895 mL      05 Sep 2017 07:01  -  06 Sep 2017 06:46  --------------------------------------------------------  IN:    dextrose 5% + sodium chloride 0.45%.: 540 mL    Oral Fluid: 150 mL  Total IN: 690 mL    OUT:    Indwelling Catheter - Urethral: 600 mL  Total OUT: 600 mL    Total NET: 90 mL          Lab Data                        10.3   7.6   )-----------( 292      ( 05 Sep 2017 07:34 )             31.8     09-05    142  |  102  |  24<H>  ----------------------------<  123<H>  3.9   |  33<H>  |  1.70<H>    Ca    8.5      05 Sep 2017 07:34      ABG - ( 05 Sep 2017 10:41 )  pH: x     /  pCO2: 58    /  pO2: 61    / HCO3: 31    / Base Excess: 8.4   /  SaO2: 91                      Review of Systems	      Objective     Physical Examination  obese  head at  heart - s1s2  lungs - dec BS  abd - soft        Pertinent Lab findings & Imaging      Kermit:  NO   Adequate UO     I&O's Detail    04 Sep 2017 07:01  -  05 Sep 2017 07:00  --------------------------------------------------------  IN:    IV PiggyBack: 150 mL    lactated ringers.: 1600 mL    lactated ringers.: 1100 mL  Total IN: 2850 mL    OUT:    Estimated Blood Loss: 200 mL    Indwelling Catheter - Urethral: 920 mL    Voided: 835 mL  Total OUT: 1955 mL    Total NET: 895 mL      05 Sep 2017 07:01  -  06 Sep 2017 06:46  --------------------------------------------------------  IN:    dextrose 5% + sodium chloride 0.45%.: 540 mL    Oral Fluid: 150 mL  Total IN: 690 mL    OUT:    Indwelling Catheter - Urethral: 600 mL  Total OUT: 600 mL    Total NET: 90 mL               Discussed with:     Cultures:	        Radiology

## 2017-09-06 NOTE — PROGRESS NOTE ADULT - SUBJECTIVE AND OBJECTIVE BOX
Patient is a 84y old  Male who presents with a chief complaint of "left hip" (02 Sep 2017 14:21)      INTERVAL HPI/OVERNIGHT EVENTS:  Pt is seen and examined.  feels ok. more awake today.     Pain Location & Control:     MEDICATIONS  (STANDING):  influenza   Vaccine 0.5 milliLiter(s) IntraMuscular once  vancomycin  IVPB 1000 milliGRAM(s) IV Intermittent every 24 hours  apixaban 2.5 milliGRAM(s) Oral every 12 hours  doxazosin 4 milliGRAM(s) Oral at bedtime  loratadine 10 milliGRAM(s) Oral daily  bicalutamide 50 milliGRAM(s) Oral daily  metoprolol succinate ER 25 milliGRAM(s) Oral daily  pantoprazole    Tablet 40 milliGRAM(s) Oral before breakfast  docusate sodium 100 milliGRAM(s) Oral three times a day  dextrose 5% + sodium chloride 0.45%. 1000 milliLiter(s) (60 mL/Hr) IV Continuous <Continuous>  meropenem IVPB 1000 milliGRAM(s) IV Intermittent every 12 hours  meropenem IVPB   IV Intermittent     MEDICATIONS  (PRN):  aluminum hydroxide/magnesium hydroxide/simethicone Suspension 30 milliLiter(s) Oral four times a day PRN Indigestion  ondansetron Injectable 4 milliGRAM(s) IV Push every 6 hours PRN Nausea and/or Vomiting  polyethylene glycol 3350 17 Gram(s) Oral daily PRN Constipation  bisacodyl Suppository 10 milliGRAM(s) Rectal daily PRN If no bowel movement by POD#2  senna 2 Tablet(s) Oral at bedtime PRN Constipation  magnesium hydroxide Suspension 30 milliLiter(s) Oral daily PRN Constipation  traMADol 25 milliGRAM(s) Oral three times a day PRN Moderate Pain (4 - 6)      Allergies    No Known Allergies    Intolerances            Vital Signs Last 24 Hrs  T(C): 38.3 (06 Sep 2017 05:14), Max: 38.3 (05 Sep 2017 14:39)  T(F): 100.9 (06 Sep 2017 05:14), Max: 100.9 (05 Sep 2017 14:39)  HR: 84 (06 Sep 2017 05:14) (81 - 89)  BP: 111/71 (06 Sep 2017 05:14) (111/67 - 130/77)  BP(mean): --  RR: 20 (06 Sep 2017 05:14) (18 - 20)  SpO2: 92% (06 Sep 2017 05:14) (90% - 93%)        LABS:                        9.7    8.3   )-----------( 262      ( 06 Sep 2017 07:33 )             28.7     06 Sep 2017 07:33    140    |  102    |  25     ----------------------------<  125    3.4     |  32     |  1.65     Ca    8.5        06 Sep 2017 07:33      RADIOLOGY & ADDITIONAL TESTS:    Imaging Personally Reviewed:  [ ] YES  [ ] NO    Consultant(s) Notes Reviewed:  [ x] YES  [ ] NO    Care Discussed with Consultants/Other Providers [ x] YES  [ ] NO Patient is a 84y old  Male who presents with a chief complaint of "left hip" (02 Sep 2017 14:21)      INTERVAL HPI/OVERNIGHT EVENTS:  Pt is seen and examined.  84y Male  s/p  Left  Hip  Revision THR, I&D, cement spacer    feels ok. more awake today.     Pain Location & Control:     MEDICATIONS  (STANDING):  influenza   Vaccine 0.5 milliLiter(s) IntraMuscular once  vancomycin  IVPB 1000 milliGRAM(s) IV Intermittent every 24 hours  apixaban 2.5 milliGRAM(s) Oral every 12 hours  doxazosin 4 milliGRAM(s) Oral at bedtime  loratadine 10 milliGRAM(s) Oral daily  bicalutamide 50 milliGRAM(s) Oral daily  metoprolol succinate ER 25 milliGRAM(s) Oral daily  pantoprazole    Tablet 40 milliGRAM(s) Oral before breakfast  docusate sodium 100 milliGRAM(s) Oral three times a day  dextrose 5% + sodium chloride 0.45%. 1000 milliLiter(s) (60 mL/Hr) IV Continuous <Continuous>  meropenem IVPB 1000 milliGRAM(s) IV Intermittent every 12 hours  meropenem IVPB   IV Intermittent     MEDICATIONS  (PRN):  aluminum hydroxide/magnesium hydroxide/simethicone Suspension 30 milliLiter(s) Oral four times a day PRN Indigestion  ondansetron Injectable 4 milliGRAM(s) IV Push every 6 hours PRN Nausea and/or Vomiting  polyethylene glycol 3350 17 Gram(s) Oral daily PRN Constipation  bisacodyl Suppository 10 milliGRAM(s) Rectal daily PRN If no bowel movement by POD#2  senna 2 Tablet(s) Oral at bedtime PRN Constipation  magnesium hydroxide Suspension 30 milliLiter(s) Oral daily PRN Constipation  traMADol 25 milliGRAM(s) Oral three times a day PRN Moderate Pain (4 - 6)      Allergies    No Known Allergies    Intolerances            Vital Signs Last 24 Hrs  T(C): 38.3 (06 Sep 2017 05:14), Max: 38.3 (05 Sep 2017 14:39)  T(F): 100.9 (06 Sep 2017 05:14), Max: 100.9 (05 Sep 2017 14:39)  HR: 84 (06 Sep 2017 05:14) (81 - 89)  BP: 111/71 (06 Sep 2017 05:14) (111/67 - 130/77)  BP(mean): --  RR: 20 (06 Sep 2017 05:14) (18 - 20)  SpO2: 92% (06 Sep 2017 05:14) (90% - 93%)        LABS:                        9.7    8.3   )-----------( 262      ( 06 Sep 2017 07:33 )             28.7     06 Sep 2017 07:33    140    |  102    |  25     ----------------------------<  125    3.4     |  32     |  1.65     Ca    8.5        06 Sep 2017 07:33      RADIOLOGY & ADDITIONAL TESTS:    Imaging Personally Reviewed:  [ ] YES  [ ] NO    Consultant(s) Notes Reviewed:  [ x] YES  [ ] NO    Care Discussed with Consultants/Other Providers [ x] YES  [ ] NO

## 2017-09-06 NOTE — PROGRESS NOTE ADULT - ASSESSMENT
The patient is an 84 year old male with a history of HTN, OA, prostate cancer, left THR 8/2017 who presents with infected periprosthetic fracture s/p surgery.    Plan:  - Continue metoprolol succinate  - Echocardiogram with grossly normal LV systolic function, no significant valve issues  - On apixaban for DVT prophylaxis  - On vancomycin, meropenem

## 2017-09-06 NOTE — PROGRESS NOTE ADULT - ASSESSMENT
85 y/o male with pmh of OA, seasonal  rhinitis, Ca Prostate, left anterior THR on aug17th  is admitted with c/o pain in left hip, +redness around surgical site for few days. Pt was started on po antibiotics with no improvement of symptoms so admitted for further evaluation. Xray revealed periprosthetic fracture. Pt was evaluated by ortho in ED,  S/p debridement and revision of joint.  Seen for AMS/Lethargy secondary to delirium/medication effect. Received Dilaudid for pain.   CT Head - No acute pathology.  No signs of CVA.  Avoid narcotic pain meds.  Dilaudid is discontinued.  On Tramadol PRN for pain.

## 2017-09-07 LAB
ANION GAP SERPL CALC-SCNC: 3 MMOL/L — LOW (ref 5–17)
BUN SERPL-MCNC: 24 MG/DL — HIGH (ref 7–23)
CALCIUM SERPL-MCNC: 8.5 MG/DL — SIGNIFICANT CHANGE UP (ref 8.4–10.5)
CHLORIDE SERPL-SCNC: 102 MMOL/L — SIGNIFICANT CHANGE UP (ref 96–108)
CO2 SERPL-SCNC: 33 MMOL/L — HIGH (ref 22–31)
CREAT SERPL-MCNC: 1.53 MG/DL — HIGH (ref 0.5–1.3)
CULTURE RESULTS: SIGNIFICANT CHANGE UP
CULTURE RESULTS: SIGNIFICANT CHANGE UP
GLUCOSE SERPL-MCNC: 117 MG/DL — HIGH (ref 70–99)
HCT VFR BLD CALC: 27.1 % — LOW (ref 39–50)
HGB BLD-MCNC: 8.9 G/DL — LOW (ref 13–17)
MCHC RBC-ENTMCNC: 30.9 PG — SIGNIFICANT CHANGE UP (ref 27–34)
MCHC RBC-ENTMCNC: 32.9 GM/DL — SIGNIFICANT CHANGE UP (ref 32–36)
MCV RBC AUTO: 94 FL — SIGNIFICANT CHANGE UP (ref 80–100)
PLATELET # BLD AUTO: 242 K/UL — SIGNIFICANT CHANGE UP (ref 150–400)
POTASSIUM SERPL-MCNC: 3.7 MMOL/L — SIGNIFICANT CHANGE UP (ref 3.5–5.3)
POTASSIUM SERPL-SCNC: 3.7 MMOL/L — SIGNIFICANT CHANGE UP (ref 3.5–5.3)
RBC # BLD: 2.88 M/UL — LOW (ref 4.2–5.8)
RBC # FLD: 11.6 % — SIGNIFICANT CHANGE UP (ref 10.3–14.5)
SODIUM SERPL-SCNC: 138 MMOL/L — SIGNIFICANT CHANGE UP (ref 135–145)
SPECIMEN SOURCE: SIGNIFICANT CHANGE UP
SPECIMEN SOURCE: SIGNIFICANT CHANGE UP
WBC # BLD: 7.4 K/UL — SIGNIFICANT CHANGE UP (ref 3.8–10.5)
WBC # FLD AUTO: 7.4 K/UL — SIGNIFICANT CHANGE UP (ref 3.8–10.5)

## 2017-09-07 PROCEDURE — 99233 SBSQ HOSP IP/OBS HIGH 50: CPT

## 2017-09-07 PROCEDURE — 71010: CPT | Mod: 26

## 2017-09-07 RX ORDER — CHLORHEXIDINE GLUCONATE 213 G/1000ML
1 SOLUTION TOPICAL DAILY
Qty: 0 | Refills: 0 | Status: DISCONTINUED | OUTPATIENT
Start: 2017-09-07 | End: 2017-09-08

## 2017-09-07 RX ORDER — FUROSEMIDE 40 MG
40 TABLET ORAL DAILY
Qty: 0 | Refills: 0 | Status: DISCONTINUED | OUTPATIENT
Start: 2017-09-07 | End: 2017-09-08

## 2017-09-07 RX ADMIN — LORATADINE 10 MILLIGRAM(S): 10 TABLET ORAL at 11:27

## 2017-09-07 RX ADMIN — TRAMADOL HYDROCHLORIDE 25 MILLIGRAM(S): 50 TABLET ORAL at 06:25

## 2017-09-07 RX ADMIN — TRAMADOL HYDROCHLORIDE 25 MILLIGRAM(S): 50 TABLET ORAL at 11:25

## 2017-09-07 RX ADMIN — TRAMADOL HYDROCHLORIDE 25 MILLIGRAM(S): 50 TABLET ORAL at 10:35

## 2017-09-07 RX ADMIN — CHLORHEXIDINE GLUCONATE 1 APPLICATION(S): 213 SOLUTION TOPICAL at 21:55

## 2017-09-07 RX ADMIN — SODIUM CHLORIDE 60 MILLILITER(S): 9 INJECTION, SOLUTION INTRAVENOUS at 17:43

## 2017-09-07 RX ADMIN — TRAMADOL HYDROCHLORIDE 25 MILLIGRAM(S): 50 TABLET ORAL at 19:12

## 2017-09-07 RX ADMIN — Medication 40 MILLIGRAM(S): at 11:27

## 2017-09-07 RX ADMIN — Medication 250 MILLIGRAM(S): at 10:15

## 2017-09-07 RX ADMIN — Medication 100 MILLIGRAM(S): at 21:54

## 2017-09-07 RX ADMIN — TRAMADOL HYDROCHLORIDE 25 MILLIGRAM(S): 50 TABLET ORAL at 18:37

## 2017-09-07 RX ADMIN — MEROPENEM 200 MILLIGRAM(S): 1 INJECTION INTRAVENOUS at 05:39

## 2017-09-07 RX ADMIN — APIXABAN 2.5 MILLIGRAM(S): 2.5 TABLET, FILM COATED ORAL at 21:54

## 2017-09-07 RX ADMIN — BICALUTAMIDE 50 MILLIGRAM(S): 50 TABLET, FILM COATED ORAL at 17:42

## 2017-09-07 RX ADMIN — Medication 25 MILLIGRAM(S): at 05:40

## 2017-09-07 RX ADMIN — PANTOPRAZOLE SODIUM 40 MILLIGRAM(S): 20 TABLET, DELAYED RELEASE ORAL at 05:40

## 2017-09-07 RX ADMIN — TRAMADOL HYDROCHLORIDE 25 MILLIGRAM(S): 50 TABLET ORAL at 05:40

## 2017-09-07 RX ADMIN — SODIUM CHLORIDE 60 MILLILITER(S): 9 INJECTION, SOLUTION INTRAVENOUS at 21:54

## 2017-09-07 RX ADMIN — Medication 4 MILLIGRAM(S): at 21:54

## 2017-09-07 RX ADMIN — Medication 100 MILLIGRAM(S): at 05:40

## 2017-09-07 RX ADMIN — SENNA PLUS 2 TABLET(S): 8.6 TABLET ORAL at 21:54

## 2017-09-07 RX ADMIN — APIXABAN 2.5 MILLIGRAM(S): 2.5 TABLET, FILM COATED ORAL at 10:15

## 2017-09-07 RX ADMIN — MEROPENEM 200 MILLIGRAM(S): 1 INJECTION INTRAVENOUS at 17:42

## 2017-09-07 NOTE — PROGRESS NOTE ADULT - SUBJECTIVE AND OBJECTIVE BOX
CRISTINA MCCLELLAN is a yMale , patient examined and chart reviewed.     INTERVAL HPI/ OVERNIGHT EVENTS:   No events, Afebrile.    PAST MEDICAL & SURGICAL HISTORY:  Hearing loss  Osteoarthritis  Fluid retention in legs  Seasonal allergies  Prostate cancer: 48 treatments of RT  History of hip replacement, total, left  S/P inguinal hernia repair: 1961  S/P cholecystectomy: 1985  History of appendectomy      For details regarding the patient's social history, family history, and other miscellaneous elements, please refer the initial infectious diseases consultation and/or the admitting history and physical examination for this admission.    ROS:  CONSTITUTIONAL:  Negative fever or chills,   EYES:  Negative  blurry vision or double vision  CARDIOVASCULAR:  Negative for chest pain or palpitations  RESPIRATORY:  Negative for cough, wheezing, or SOB   GASTROINTESTINAL:  Negative for nausea, vomiting, diarrhea, constipation, or abdominal pain  GENITOURINARY:  Negative frequency, urgency or dysuria  NEUROLOGIC:  No headache, confusion, dizziness, lightheadedness  All other systems were reviewed and are negative       Current inpatient medications :    ANTIBIOTICS/RELEVANT:  vancomycin  IVPB 1000 milliGRAM(s) IV Intermittent every 24 hours  meropenem IVPB 1000 milliGRAM(s) IV Intermittent every 12 hours  meropenem IVPB   IV Intermittent       apixaban 2.5 milliGRAM(s) Oral every 12 hours  doxazosin 4 milliGRAM(s) Oral at bedtime  loratadine 10 milliGRAM(s) Oral daily  bicalutamide 50 milliGRAM(s) Oral daily  metoprolol succinate ER 25 milliGRAM(s) Oral daily  pantoprazole    Tablet 40 milliGRAM(s) Oral before breakfast  aluminum hydroxide/magnesium hydroxide/simethicone Suspension 30 milliLiter(s) Oral four times a day PRN  ondansetron Injectable 4 milliGRAM(s) IV Push every 6 hours PRN  polyethylene glycol 3350 17 Gram(s) Oral daily PRN  bisacodyl Suppository 10 milliGRAM(s) Rectal daily PRN  senna 2 Tablet(s) Oral at bedtime PRN  magnesium hydroxide Suspension 30 milliLiter(s) Oral daily PRN  docusate sodium 100 milliGRAM(s) Oral three times a day  dextrose 5% + sodium chloride 0.45%. 1000 milliLiter(s) IV Continuous <Continuous>  traMADol 25 milliGRAM(s) Oral three times a day PRN  furosemide    Tablet 40 milliGRAM(s) Oral daily      Objective:    09-06 @ 07:01  -  09-07 @ 07:00  --------------------------------------------------------  IN: 1850 mL / OUT: 1175 mL / NET: 675 mL    09-07 @ 07:01  -  09-07 @ 16:05  --------------------------------------------------------  IN: 420 mL / OUT: 50 mL / NET: 370 mL      T(C): 36.9 (09-07-17 @ 14:41), Max: 37.1 (09-07-17 @ 00:57)  HR: 86 (09-07-17 @ 14:41) (79 - 86)  BP: 148/71 (09-07-17 @ 14:41) (111/67 - 148/71)  RR: 20 (09-07-17 @ 14:41) (18 - 20)  SpO2: 97% (09-07-17 @ 14:41) (96% - 98%)  Wt(kg): --      Physical Exam:  General: No acute distress  Eyes: sclera anicteric, pupils equal and reactive to light  ENMT: buccal mucosa moist, pharynx not injected  Neck: supple, trachea midline  Lungs: clear, no wheeze/rhonchi  Cardiovascular: regular rate and rhythm, S1 S2  Abdomen: soft, nontender, no organomegaly present, bowel sounds normal  Neurological: no focal deficit  Extremities: no cyanosis/clubbing/edema  Left hip drsg c/d/i      LABS:                          8.9    7.4   )-----------( 242      ( 07 Sep 2017 07:39 )             27.1       09-07    138  |  102  |  24<H>  ----------------------------<  117<H>  3.7   |  33<H>  |  1.53<H>    Ca    8.5      07 Sep 2017 07:39                    RECENT CULTURES:  Culture - Other (09.02.17 @ 12:35)    -  Ampicillin: R >16    -  Ampicillin/Sulbactam: R >16/8    -  Ceftriaxone: R 32    -  Ciprofloxacin: S <=0.5    -  Ciprofloxacin: S <=0.5    -  Aztreonam: R >16    -  Aztreonam: R >16    -  Cefazolin: R >16    -  Gentamicin: S 4    -  Levofloxacin: S <=1    -  Levofloxacin: S <=1    -  Meropenem: S <=1    -  Meropenem: S <=1    -  Tobramycin: S 4    -  Trimethoprim/Sulfamethoxazole: S <=0.5/9.5    -  Cefepime: I 16    -  Cefepime: S 8    -  Tobramycin: S <=2    -  Amikacin: S <=8    -  Amikacin: S <=8    -  Cefoxitin: R >16    -  Ceftazidime: R >16    -  Ceftazidime: R >16    -  Ertapenem: S <=0.5    -  Gentamicin: S <=1    -  Imipenem: S <=1    -  Imipenem: S <=1    -  Piperacillin/Tazobactam: R >64    -  Piperacillin/Tazobactam: R >64    Specimen Source: .Other Other, post op left hip    Culture Results:   Numerous Serratia marcescens  Numerous Pseudomonas aeruginosa  Few Coag Negative Staphylococcus    Organism Identification: Serratia marcescens  Pseudomonas aeruginosa    Organism: Serratia marcescens    Organism: Pseudomonas aeruginosa    Method Type: KASH    Method Type: KASH        RADIOLOGY & ADDITIONAL STUDIES:    EXAM:  XR HIP WITH PELV 1V LT                          PROCEDURE DATE:  09/02/2017      INTERPRETATION:  Single frontal view the pelvis and 2 views of the left   hip    Clinical indication: Left hip soreness, evaluate for osteomyelitis    FINDINGS: The bones are osteopenic. The patient is status post total left   hip replacement. There is a minimally displaced periprosthetic fracture   along the medial aspect of the proximal left femoral shaft. There are   marked degenerative changes of the right hip with obliteration of the   joint space and degenerative changes of the lower lumbar spine. Calcified   bladder stones are noted. Surgical clips overlie the pubic symphysis and   left greater trochanter.    IMPRESSION: Minimally displaced periprosthetic fracture along the medial   aspect of the proximal left femoral shaft.      Additional findings as above.    Findings were discussed with Dr. Punsal of the ED at the time of this   dictation.      Assessment :   84 year old male sp left hip ORIF on 8/17/17 admitted for Left hip surgical wound infection  Sp Revision of right total hip arthroplasty by posterior approach  09/04/2017    Left, I&D of anterior incision, explantation hardware, insertion cement spacer.  CKD  Surface culture noted  Looks like no OR cultures sent     Plan :   Cont Meropenam and Vancomycin till 10/18/17  Monitor renal fx closely while on Vancomycin  Aspiration precautions  Encourage pulm toileting  PICC line  Will need 6 weeks of IV antibx      I have discussed the above plan of care with patient in detail. He expressed understanding of the  treatment plan . Risks, benefits and alternatives discussed in detail.  I have asked if he has any questions or concerns and appropriately addressed them to the best of my ability .    > 35 minutes were spent in direct patient care reviewing notes, medications ,labs data/ imaging , discussion with multidisciplinary team.    Thank you for allowing me to participate in care of your patient .    Zurdo Braden MD  Infectious Disease  [phone]

## 2017-09-07 NOTE — PROGRESS NOTE ADULT - SUBJECTIVE AND OBJECTIVE BOX
Patient is a 84y old  Male who presents with a chief complaint of "left hip" (02 Sep 2017 14:21)      INTERVAL HPI/OVERNIGHT EVENTS:  Pt is seen and examined.   c/o pain in left hip at times.  otherwise feels ok.  Pain Location & Control:     MEDICATIONS  (STANDING):  influenza   Vaccine 0.5 milliLiter(s) IntraMuscular once  vancomycin  IVPB 1000 milliGRAM(s) IV Intermittent every 24 hours  apixaban 2.5 milliGRAM(s) Oral every 12 hours  doxazosin 4 milliGRAM(s) Oral at bedtime  loratadine 10 milliGRAM(s) Oral daily  bicalutamide 50 milliGRAM(s) Oral daily  metoprolol succinate ER 25 milliGRAM(s) Oral daily  pantoprazole    Tablet 40 milliGRAM(s) Oral before breakfast  docusate sodium 100 milliGRAM(s) Oral three times a day  dextrose 5% + sodium chloride 0.45%. 1000 milliLiter(s) (60 mL/Hr) IV Continuous <Continuous>  meropenem IVPB 1000 milliGRAM(s) IV Intermittent every 12 hours  meropenem IVPB   IV Intermittent   furosemide    Tablet 40 milliGRAM(s) Oral daily    MEDICATIONS  (PRN):  aluminum hydroxide/magnesium hydroxide/simethicone Suspension 30 milliLiter(s) Oral four times a day PRN Indigestion  ondansetron Injectable 4 milliGRAM(s) IV Push every 6 hours PRN Nausea and/or Vomiting  polyethylene glycol 3350 17 Gram(s) Oral daily PRN Constipation  bisacodyl Suppository 10 milliGRAM(s) Rectal daily PRN If no bowel movement by POD#2  senna 2 Tablet(s) Oral at bedtime PRN Constipation  magnesium hydroxide Suspension 30 milliLiter(s) Oral daily PRN Constipation  traMADol 25 milliGRAM(s) Oral three times a day PRN Moderate Pain (4 - 6)      Allergies    No Known Allergies    Intolerances            Vital Signs Last 24 Hrs  T(C): 36.7 (07 Sep 2017 05:08), Max: 37.1 (07 Sep 2017 00:57)  T(F): 98 (07 Sep 2017 05:08), Max: 98.7 (07 Sep 2017 00:57)  HR: 80 (07 Sep 2017 05:08) (79 - 83)  BP: 111/67 (07 Sep 2017 05:08) (111/67 - 126/72)  BP(mean): --  RR: 20 (07 Sep 2017 05:08) (18 - 20)  SpO2: 98% (07 Sep 2017 05:08) (96% - 98%)        LABS:                        8.9    7.4   )-----------( 242      ( 07 Sep 2017 07:39 )             27.1     07 Sep 2017 07:39    138    |  102    |  24     ----------------------------<  117    3.7     |  33     |  1.53     Ca    8.5        07 Sep 2017 07:39        RADIOLOGY & ADDITIONAL TESTS:    Imaging Personally Reviewed:  [ ] YES  [ ] NO    Consultant(s) Notes Reviewed:  [x ] YES  [ ] NO    Care Discussed with Consultants/Other Providers [ x] YES  [ ] NO Patient is a 84y old  Male who presents with a chief complaint of "left hip" (02 Sep 2017 14:21)      INTERVAL HPI/OVERNIGHT EVENTS:      Pt is seen and examined.  84y Male  s/p  Left  Hip  Revision THR, I&D, cement spacer     c/o pain in left hip at times.  otherwise feels ok.  Pain Location & Control:     MEDICATIONS  (STANDING):  influenza   Vaccine 0.5 milliLiter(s) IntraMuscular once  vancomycin  IVPB 1000 milliGRAM(s) IV Intermittent every 24 hours  apixaban 2.5 milliGRAM(s) Oral every 12 hours  doxazosin 4 milliGRAM(s) Oral at bedtime  loratadine 10 milliGRAM(s) Oral daily  bicalutamide 50 milliGRAM(s) Oral daily  metoprolol succinate ER 25 milliGRAM(s) Oral daily  pantoprazole    Tablet 40 milliGRAM(s) Oral before breakfast  docusate sodium 100 milliGRAM(s) Oral three times a day  dextrose 5% + sodium chloride 0.45%. 1000 milliLiter(s) (60 mL/Hr) IV Continuous <Continuous>  meropenem IVPB 1000 milliGRAM(s) IV Intermittent every 12 hours  meropenem IVPB   IV Intermittent   furosemide    Tablet 40 milliGRAM(s) Oral daily    MEDICATIONS  (PRN):  aluminum hydroxide/magnesium hydroxide/simethicone Suspension 30 milliLiter(s) Oral four times a day PRN Indigestion  ondansetron Injectable 4 milliGRAM(s) IV Push every 6 hours PRN Nausea and/or Vomiting  polyethylene glycol 3350 17 Gram(s) Oral daily PRN Constipation  bisacodyl Suppository 10 milliGRAM(s) Rectal daily PRN If no bowel movement by POD#2  senna 2 Tablet(s) Oral at bedtime PRN Constipation  magnesium hydroxide Suspension 30 milliLiter(s) Oral daily PRN Constipation  traMADol 25 milliGRAM(s) Oral three times a day PRN Moderate Pain (4 - 6)      Allergies    No Known Allergies    Intolerances            Vital Signs Last 24 Hrs  T(C): 36.7 (07 Sep 2017 05:08), Max: 37.1 (07 Sep 2017 00:57)  T(F): 98 (07 Sep 2017 05:08), Max: 98.7 (07 Sep 2017 00:57)  HR: 80 (07 Sep 2017 05:08) (79 - 83)  BP: 111/67 (07 Sep 2017 05:08) (111/67 - 126/72)  BP(mean): --  RR: 20 (07 Sep 2017 05:08) (18 - 20)  SpO2: 98% (07 Sep 2017 05:08) (96% - 98%)        LABS:                        8.9    7.4   )-----------( 242      ( 07 Sep 2017 07:39 )             27.1     07 Sep 2017 07:39    138    |  102    |  24     ----------------------------<  117    3.7     |  33     |  1.53     Ca    8.5        07 Sep 2017 07:39        RADIOLOGY & ADDITIONAL TESTS:    Imaging Personally Reviewed:  [ ] YES  [ ] NO    Consultant(s) Notes Reviewed:  [x ] YES  [ ] NO    Care Discussed with Consultants/Other Providers [ x] YES  [ ] NO

## 2017-09-07 NOTE — PROGRESS NOTE ADULT - SUBJECTIVE AND OBJECTIVE BOX
Chief Complaint: Periprosthetic fracture, infection    Interval Events: Episode of AMS, resolved. No events overnight.    Review of Systems:  General: No fevers, chills, weight loss or gain  Skin: No rashes, color changes  Cardiovascular: No chest pain, orthopnea  Respiratory: No shortness of breath, cough  Gastrointestinal: No nausea, abdominal pain  Genitourinary: No incontinence, pain with urination  Musculoskeletal: No pain, swelling, decreased range of motion  Neurological: No headache, weakness  Psychiatric: No depression, anxiety  Endocrine: No weight loss or gain, increased thirst  All other systems are comprehensively negative.    Physical Exam:  Vital Signs Last 24 Hrs  T(C): 36.7 (07 Sep 2017 05:08), Max: 37.1 (07 Sep 2017 00:57)  T(F): 98 (07 Sep 2017 05:08), Max: 98.7 (07 Sep 2017 00:57)  HR: 80 (07 Sep 2017 05:08) (79 - 84)  BP: 111/67 (07 Sep 2017 05:08) (109/62 - 126/72)  BP(mean): --  RR: 20 (07 Sep 2017 05:08) (18 - 20)  SpO2: 98% (07 Sep 2017 05:08) (96% - 98%)  General: NAD  Neck: No JVD, no carotid bruit  Lungs: CTAB  CV: RRR, nl S1/S2, no M/R/G  Abdomen: S/NT/ND, +BS  Extremities: No LE edema, no cyanosis    Labs:             09-07    138  |  102  |  24<H>  ----------------------------<  117<H>  3.7   |  33<H>  |  1.53<H>    Ca    8.5      07 Sep 2017 07:39                          8.9    7.4   )-----------( 242      ( 07 Sep 2017 07:39 )             27.1

## 2017-09-07 NOTE — PROGRESS NOTE ADULT - ASSESSMENT
83 y/o male with pmh of OA, seasonal  rhinitis, Ca Prostate, left anterior THR on aug17th  is admitted with c/o pain in left hip, +redness around surgical site for few days. Pt was started on po antibiotics with no improvement of symptoms so admitted for further evaluation. Xray revealed periprosthetic fracture. Pt was evaluated by ortho in ED,  S/p debridement and revision of joint.  Seen for AMS/Lethargy secondary to delirium/medication effect. Received Dilaudid for pain.   CT Head - No acute pathology.  No signs of CVA.  Avoid narcotic pain meds.  Dilaudid is discontinued.  On Tramadol PRN for pain.  Surgical wound infection on Antibiotics.  ID is following.  Awaits PICC Line.

## 2017-09-07 NOTE — PROGRESS NOTE ADULT - SUBJECTIVE AND OBJECTIVE BOX
Neurology Follow up note    RCISTINA MCCLELLAN 84y Male    HPI:  85 y/o male with pmh of OA, seasonal  rhinitis, Ca Prostate, left anterior THR on aug17th  is admitted with c/o pain in left hip, +redness around surgical site for few days. Pt was started on po antibiotics with no improvement of symptoms so admitted for further evaluation. Xray revealed periprosthetic fracture.  pt is evaluated by ortho in ED, needs debridement and revision of join,. Pt wants to think for surgery. ( is covering ).was on Eliqis for DVT prophylaxis, last dose is taken yesterday. (02 Sep 2017 14:18)    Interval History -    Patient is seen, chart was reviewed and case was discussed with the treatment team.  Pt is not in any distress.   Lying on bed comfortably.     Vital Signs Last 24 Hrs  T(C): 36.7 (07 Sep 2017 05:08), Max: 37.1 (07 Sep 2017 00:57)  T(F): 98 (07 Sep 2017 05:08), Max: 98.7 (07 Sep 2017 00:57)  HR: 80 (07 Sep 2017 05:08) (79 - 84)  BP: 111/67 (07 Sep 2017 05:08) (109/62 - 126/72)  BP(mean): --  RR: 20 (07 Sep 2017 05:08) (18 - 20)  SpO2: 98% (07 Sep 2017 05:08) (96% - 98%)    REVIEW OF SYSTEMS:    CONSTITUTIONAL: No fever  EYES: No eye pain,   ENMT:  No sinus or throat pain  NECK: No pain or stiffness  RESPIRATORY: No cough, No hemoptysis; No shortness of breath  CARDIOVASCULAR: No acute chest pain, palpitations,  or leg swelling  GASTROINTESTINAL: No abdominal pain. No nausea, vomiting, or hematemesis;  No melena or hematochezia.  GENITOURINARY: No  hematuria, or incontinence  MUSCULOSKELETAL: No joint swelling; No extremity pain  SKIN: No itching, rashes, or lesions   LYMPH NODES: No enlarged glands  NEUROLOGICAL: No headaches, memory loss,   PSYCHIATRIC: No depression, anxiety, mood swings, or difficulty sleeping  ENDOCRINE: No heat or cold intolerance;   HEME/LYMPH: No easy bruising, or bleeding gums  Allergy/Immunology. No medication allergy. No seasonal allergies.    GENERAL: NAD, well-groomed, well-developed  HEAD:  Atraumatic, Normocephalic  EYES: EOMI, PERRLA, conjunctiva and sclera clear  NECK: Supple, No JVD, thyroid non-palpable    On Neurological Examination:    Mental Status - Pt is alert, awake, Able to say month/year correctly. Higher functions are intact. Follows commands well and able to answer questions appropriately.    Speech -  Normal. Pt has no aphasia.    Cranial Nerves - Pupils 3 mm equal and reactive to light, extraocular eye movements intact. Pt has no visual field deficit. Pt has no facial asymmetry. Tongue - is in midline.    Motor Exam - Moves all extremities equally. No asymmetry is seen.      Sensory Exam - Pin prick, temperature, joint position and vibration are intact on either side. Pt withdraws all extremities equally on stimulation. No asymmetry seen. No complaints of tingling, numbness.    Gait - Not tested.    Deep tendon Reflexes - 2 plus all over.    Coordination - Fine finger movements are normal on both sides. Finger to nose is also normal on both sides.      Neck Supple -  Yes.    MEDICATIONS    influenza   Vaccine 0.5 milliLiter(s) IntraMuscular once  vancomycin  IVPB 1000 milliGRAM(s) IV Intermittent every 24 hours  apixaban 2.5 milliGRAM(s) Oral every 12 hours  doxazosin 4 milliGRAM(s) Oral at bedtime  loratadine 10 milliGRAM(s) Oral daily  bicalutamide 50 milliGRAM(s) Oral daily  metoprolol succinate ER 25 milliGRAM(s) Oral daily  pantoprazole    Tablet 40 milliGRAM(s) Oral before breakfast  aluminum hydroxide/magnesium hydroxide/simethicone Suspension 30 milliLiter(s) Oral four times a day PRN  ondansetron Injectable 4 milliGRAM(s) IV Push every 6 hours PRN  polyethylene glycol 3350 17 Gram(s) Oral daily PRN  bisacodyl Suppository 10 milliGRAM(s) Rectal daily PRN  senna 2 Tablet(s) Oral at bedtime PRN  magnesium hydroxide Suspension 30 milliLiter(s) Oral daily PRN  docusate sodium 100 milliGRAM(s) Oral three times a day  dextrose 5% + sodium chloride 0.45%. 1000 milliLiter(s) IV Continuous <Continuous>  traMADol 25 milliGRAM(s) Oral three times a day PRN  meropenem IVPB 1000 milliGRAM(s) IV Intermittent every 12 hours  meropenem IVPB   IV Intermittent       Allergies    No Known Allergies    LABS:    CBC Full  -  ( 07 Sep 2017 07:39 )  WBC Count : 7.4 K/uL  Hemoglobin : 8.9 g/dL  Hematocrit : 27.1 %  Platelet Count - Automated : 242 K/uL  Mean Cell Volume : 94.0 fl  Mean Cell Hemoglobin : 30.9 pg  Mean Cell Hemoglobin Concentration : 32.9 gm/dL    09-07    138  |  102  |  24<H>  ----------------------------<  117<H>  3.7   |  33<H>  |  1.53<H>    Ca    8.5      07 Sep 2017 07:39    RADIOLOGY    < from: CT Head No Cont (09.05.17 @ 10:55) >  EXAM:  CT BRAIN                            PROCEDURE DATE:  09/05/2017          INTERPRETATION:  CT BRAIN    HISTORY:  Alteration of  Consciousness    TECHNIQUE: CT of the head was performed without intravenous contrast.   Multiplanar reformatted images were then generated from the axial   acquired data.  This study was performed using automatic exposure control   (radiation dose reduction software) to obtain a diagnostic image quality   scan with patient dose as low as reasonably achievable.    COMPARISON: None available    FINDINGS:     INTRACRANIAL FINDINGS: There is age-related atrophy and chronic   microvascular ischemic change. No evidence for acute territorial infarct,   mass lesion, mass effect, or recent hemorrhage. There is no abnormal   extra axial collection.    EXTRACRANIAL FINDINGS: No extracalvarial soft tissue swelling. No   depressed calvarial fracture. The orbital contents are unremarkable. The   visualized paranasal sinuses are well aerated. The mastoid air cells are   clear.    IMPRESSION:     No acute intracranial hemorrhage, mass effect, or midline shift.       AALIYAH MARCUS M.D., ATTENDING RADIOLOGIST  This document has been electronically signed. Sep  5 2017 11:24AM              < end of copied text >

## 2017-09-07 NOTE — PROGRESS NOTE ADULT - SUBJECTIVE AND OBJECTIVE BOX
Date/Time Patient Seen:  		  Referring MD:   Data Reviewed	       Patient is a 84y old  Male who presents with a chief complaint of "left hip" (02 Sep 2017 14:21)    in bed  seen and examined  vs and meds reviewed    Subjective/HPI     PAST MEDICAL & SURGICAL HISTORY:  Hearing loss  Osteoarthritis  Fluid retention in legs  Seasonal allergies  Prostate cancer: 48 treatments of RT  History of hip replacement, total, left  S/P inguinal hernia repair: 1961  S/P cholecystectomy: 1985  History of appendectomy        Medication list         MEDICATIONS  (STANDING):  influenza   Vaccine 0.5 milliLiter(s) IntraMuscular once  vancomycin  IVPB 1000 milliGRAM(s) IV Intermittent every 24 hours  apixaban 2.5 milliGRAM(s) Oral every 12 hours  doxazosin 4 milliGRAM(s) Oral at bedtime  loratadine 10 milliGRAM(s) Oral daily  bicalutamide 50 milliGRAM(s) Oral daily  metoprolol succinate ER 25 milliGRAM(s) Oral daily  pantoprazole    Tablet 40 milliGRAM(s) Oral before breakfast  docusate sodium 100 milliGRAM(s) Oral three times a day  dextrose 5% + sodium chloride 0.45%. 1000 milliLiter(s) (60 mL/Hr) IV Continuous <Continuous>  meropenem IVPB 1000 milliGRAM(s) IV Intermittent every 12 hours  meropenem IVPB   IV Intermittent     MEDICATIONS  (PRN):  aluminum hydroxide/magnesium hydroxide/simethicone Suspension 30 milliLiter(s) Oral four times a day PRN Indigestion  ondansetron Injectable 4 milliGRAM(s) IV Push every 6 hours PRN Nausea and/or Vomiting  polyethylene glycol 3350 17 Gram(s) Oral daily PRN Constipation  bisacodyl Suppository 10 milliGRAM(s) Rectal daily PRN If no bowel movement by POD#2  senna 2 Tablet(s) Oral at bedtime PRN Constipation  magnesium hydroxide Suspension 30 milliLiter(s) Oral daily PRN Constipation  traMADol 25 milliGRAM(s) Oral three times a day PRN Moderate Pain (4 - 6)         Vitals log        ICU Vital Signs Last 24 Hrs  T(C): 36.7 (07 Sep 2017 05:08), Max: 37.1 (07 Sep 2017 00:57)  T(F): 98 (07 Sep 2017 05:08), Max: 98.7 (07 Sep 2017 00:57)  HR: 80 (07 Sep 2017 05:08) (79 - 84)  BP: 111/67 (07 Sep 2017 05:08) (109/62 - 126/72)  BP(mean): --  ABP: --  ABP(mean): --  RR: 20 (07 Sep 2017 05:08) (18 - 20)  SpO2: 98% (07 Sep 2017 05:08) (96% - 98%)           Input and Output:  I&O's Detail    06 Sep 2017 07:01  -  07 Sep 2017 07:00  --------------------------------------------------------  IN:    dextrose 5% + sodium chloride 0.45%.: 1260 mL    Oral Fluid: 490 mL    Solution: 100 mL  Total IN: 1850 mL    OUT:    Indwelling Catheter - Urethral: 500 mL    Voided: 675 mL  Total OUT: 1175 mL    Total NET: 675 mL          Lab Data                        9.7    8.3   )-----------( 262      ( 06 Sep 2017 07:33 )             28.7     09-06    140  |  102  |  25<H>  ----------------------------<  125<H>  3.4<L>   |  32<H>  |  1.65<H>    Ca    8.5      06 Sep 2017 07:33      ABG - ( 05 Sep 2017 10:41 )  pH: x     /  pCO2: 58    /  pO2: 61    / HCO3: 31    / Base Excess: 8.4   /  SaO2: 91                      Review of Systems	      Objective     Physical Examination  on nippv overnight  head at  heart - s1s2  frail  weak  lungs - dec BS  abd - soft  obese        Pertinent Lab findings & Imaging      Kermit:  NO   Adequate UO     I&O's Detail    06 Sep 2017 07:01  -  07 Sep 2017 07:00  --------------------------------------------------------  IN:    dextrose 5% + sodium chloride 0.45%.: 1260 mL    Oral Fluid: 490 mL    Solution: 100 mL  Total IN: 1850 mL    OUT:    Indwelling Catheter - Urethral: 500 mL    Voided: 675 mL  Total OUT: 1175 mL    Total NET: 675 mL               Discussed with:     Cultures:	        Radiology

## 2017-09-07 NOTE — PROGRESS NOTE ADULT - SUBJECTIVE AND OBJECTIVE BOX
POST OPERATIVE DAY #: 3    84y Male  s/p  Left  Hip  Revision THR, I&D, cement spacer                         SUBJECTIVE: Patient seen and examined at bedside.     Pain:  well controlled       Pain scale: 3  /10  Denies CP, SOB, N/V/D, weakness, numbness   No new complains     OBJECTIVE:     Vital Signs Last 24 Hrs  T(C): 36.7 (07 Sep 2017 05:08), Max: 37.1 (07 Sep 2017 00:57)  T(F): 98 (07 Sep 2017 05:08), Max: 98.7 (07 Sep 2017 00:57)  HR: 80 (07 Sep 2017 05:08) (79 - 83)  BP: 111/67 (07 Sep 2017 05:08) (111/67 - 126/72)  BP(mean): --  RR: 20 (07 Sep 2017 05:08) (18 - 20)  SpO2: 98% (07 Sep 2017 05:08) (96% - 98%)    Affected extremity: LLE         Dressing: changed, incision clean/dry/intact                             Sensation: intact to light touch          Motor exam: 5  / 5 Tibialis Anterior/Gastrocnemius-Soleus, EHL/FHL         warm, well-perfused; capillary refill < 3 seconds         negative calf tenderness B/L LE    LABS:                        8.9    7.4   )-----------( 242      ( 07 Sep 2017 07:39 )             27.1     09-07    138  |  102  |  24<H>  ----------------------------<  117<H>  3.7   |  33<H>  |  1.53<H>    Ca    8.5      07 Sep 2017 07:39      Wound cx: Pseudomonas aeruginosa    MEDICATIONS:  Infection prophylaxis:  vancomycin  IVPB 1000 milliGRAM(s) IV Intermittent every 24 hours  meropenem IVPB 1000 milliGRAM(s) IV Intermittent every 12 hours  meropenem IVPB   IV Intermittent     Pain management:  ondansetron Injectable 4 milliGRAM(s) IV Push every 6 hours PRN  traMADol 25 milliGRAM(s) Oral three times a day PRN    DVT prophylaxis:   apixaban 2.5 milliGRAM(s) Oral every 12 hours      RADIOLOGY & ADDITIONAL STUDIES:    ASSESSMENT AND PLAN:     - Analgesic pain control  - DVT prophylaxis:  Eliquis2.5mg twice a day  SCDs       - Antibiotics:  IV  Vancomycin, IV Meropenem  - Pending PICC line today  - PT/OT: Weight Bearing Status:  Weight bearing as tolerated, OOBTC          Posteiror Hip precautions   Abduction pillow      -  Incentive spirometry  - IVF  - Advance diet as tolerated  - Hospitalist, ID, Cardiology, Neurology, Pulmonology is following  -  Follow up labs  -  Disposition: Subacute Rehab

## 2017-09-08 ENCOUNTER — TRANSCRIPTION ENCOUNTER (OUTPATIENT)
Age: 82
End: 2017-09-08

## 2017-09-08 VITALS
SYSTOLIC BLOOD PRESSURE: 102 MMHG | RESPIRATION RATE: 16 BRPM | HEART RATE: 82 BPM | DIASTOLIC BLOOD PRESSURE: 64 MMHG | OXYGEN SATURATION: 94 % | TEMPERATURE: 98 F

## 2017-09-08 PROCEDURE — 99238 HOSP IP/OBS DSCHRG MGMT 30/<: CPT

## 2017-09-08 RX ORDER — APIXABAN 2.5 MG/1
1 TABLET, FILM COATED ORAL
Qty: 0 | Refills: 0 | COMMUNITY
Start: 2017-09-08

## 2017-09-08 RX ORDER — MEROPENEM 1 G/30ML
0 INJECTION INTRAVENOUS
Qty: 0 | Refills: 0 | COMMUNITY
Start: 2017-09-08 | End: 2017-10-18

## 2017-09-08 RX ORDER — TRAMADOL HYDROCHLORIDE 50 MG/1
0.5 TABLET ORAL
Qty: 0 | Refills: 0 | DISCHARGE
Start: 2017-09-08

## 2017-09-08 RX ORDER — LACTOBACILLUS ACIDOPHILUS 100MM CELL
1 CAPSULE ORAL
Qty: 0 | Refills: 0 | Status: DISCONTINUED | OUTPATIENT
Start: 2017-09-08 | End: 2017-09-08

## 2017-09-08 RX ORDER — LACTOBACILLUS ACIDOPHILUS 100MM CELL
0 CAPSULE ORAL
Qty: 0 | Refills: 0 | COMMUNITY
Start: 2017-09-08

## 2017-09-08 RX ORDER — VANCOMYCIN HCL 1 G
1 VIAL (EA) INTRAVENOUS
Qty: 0 | Refills: 0 | COMMUNITY
Start: 2017-09-08 | End: 2017-10-18

## 2017-09-08 RX ADMIN — TRAMADOL HYDROCHLORIDE 25 MILLIGRAM(S): 50 TABLET ORAL at 07:00

## 2017-09-08 RX ADMIN — LORATADINE 10 MILLIGRAM(S): 10 TABLET ORAL at 11:18

## 2017-09-08 RX ADMIN — TRAMADOL HYDROCHLORIDE 25 MILLIGRAM(S): 50 TABLET ORAL at 11:10

## 2017-09-08 RX ADMIN — Medication 250 MILLIGRAM(S): at 10:19

## 2017-09-08 RX ADMIN — PANTOPRAZOLE SODIUM 40 MILLIGRAM(S): 20 TABLET, DELAYED RELEASE ORAL at 06:08

## 2017-09-08 RX ADMIN — INFLUENZA VIRUS VACCINE 0.5 MILLILITER(S): 15; 15; 15; 15 SUSPENSION INTRAMUSCULAR at 11:19

## 2017-09-08 RX ADMIN — TRAMADOL HYDROCHLORIDE 25 MILLIGRAM(S): 50 TABLET ORAL at 06:08

## 2017-09-08 RX ADMIN — BICALUTAMIDE 50 MILLIGRAM(S): 50 TABLET, FILM COATED ORAL at 11:18

## 2017-09-08 RX ADMIN — Medication 40 MILLIGRAM(S): at 06:08

## 2017-09-08 RX ADMIN — Medication 100 MILLIGRAM(S): at 06:08

## 2017-09-08 RX ADMIN — SODIUM CHLORIDE 60 MILLILITER(S): 9 INJECTION, SOLUTION INTRAVENOUS at 08:28

## 2017-09-08 RX ADMIN — CHLORHEXIDINE GLUCONATE 1 APPLICATION(S): 213 SOLUTION TOPICAL at 08:07

## 2017-09-08 RX ADMIN — APIXABAN 2.5 MILLIGRAM(S): 2.5 TABLET, FILM COATED ORAL at 08:27

## 2017-09-08 RX ADMIN — Medication 25 MILLIGRAM(S): at 06:08

## 2017-09-08 RX ADMIN — TRAMADOL HYDROCHLORIDE 25 MILLIGRAM(S): 50 TABLET ORAL at 10:19

## 2017-09-08 RX ADMIN — MEROPENEM 200 MILLIGRAM(S): 1 INJECTION INTRAVENOUS at 06:08

## 2017-09-08 NOTE — PROGRESS NOTE ADULT - VASCULAR
Equal and normal pulses (carotid, femoral, dorsalis pedis)

## 2017-09-08 NOTE — DISCHARGE NOTE ADULT - MEDICATION SUMMARY - MEDICATIONS TO STOP TAKING
I will STOP taking the medications listed below when I get home from the hospital:    benzocaine-menthol 15 mg-3.6 mg mucous membrane lozenge  --  mucous membrane

## 2017-09-08 NOTE — PROGRESS NOTE ADULT - CONSTITUTIONAL
Well-developed, well nourished

## 2017-09-08 NOTE — PROGRESS NOTE ADULT - PROBLEM SELECTOR PLAN 3
at risk for DEJA  ABG noted  cont NIPPV use Prn
ckd  monitor labs  am labs pending
wound care  pain control  dvt p  on ABX  ID following  serial labs  orthop follow up
c/w inhaler.

## 2017-09-08 NOTE — DISCHARGE NOTE ADULT - CARE PLAN
(2) assistive person
Principal Discharge DX:	History of hip replacement, total, left  Goal:	Improve ambulation, ADLs and quality of life  Instructions for follow-up, activity and diet:	PT/OT Total Hip Protocol; Ambulation, transfers & ADLs  TTWB Left LE; Walker/cane use as instructed by PT/OT  Anterior THR precautions for 3 weeks: No straight leg raise; No external rotation of hip when extended-standing or lying flat; No hyperextension of hip when standing (kickback)  Ice packs to hip.  Physical Therapy /Occupational Therapy  Total Hip Protocol   - Ambulation- Transfers  - ADLs (activities of daily living)  Posterior Total Hip Replacement precautions for 4 weeks  - Abduction pillow   - High hip chair for sitting  - No internal rotation,   - No crossing legs   - No sleeping on opposite sides  Anterior THR revised to Post THR with spacer, s/p I & D

## 2017-09-08 NOTE — PROGRESS NOTE ADULT - PROBLEM SELECTOR PROBLEM 8
CKD (chronic kidney disease) stage 3, GFR 30-59 ml/min

## 2017-09-08 NOTE — PROGRESS NOTE ADULT - PROBLEM SELECTOR PROBLEM 5
Fluid retention in legs

## 2017-09-08 NOTE — PROGRESS NOTE ADULT - PROBLEM SELECTOR PROBLEM 9
Altered mental status, unspecified altered mental status type

## 2017-09-08 NOTE — DISCHARGE NOTE ADULT - INSTRUCTIONS
none  For Constipation :   • Increase your water intake. Drink at least 8 glasses of water daily.  • Try adding fiber to your diet by eating fruits, vegetables and foods that are rich in grains.  • If you do experience constipation, you may take an over-the-counter stool softener/laxative such as Birdie Colace, [place] or  Milk of Magnesia.

## 2017-09-08 NOTE — PROGRESS NOTE ADULT - PROBLEM SELECTOR PROBLEM 1
Altered mental status, unspecified altered mental status type
Obesity
Obesity
Surgical wound infection, initial encounter

## 2017-09-08 NOTE — PROGRESS NOTE ADULT - PROBLEM SELECTOR PLAN 2
caution with pain regimen, avoid pure opioids, cont with tramadol as needed  monitor vs and sat  pt currently on IVF and LASIX, consider dc IVF  monitor labs  monitor sat  NIPPV use encouraged  increase activity as tolerated  prognosis guarded  I peter

## 2017-09-08 NOTE — DISCHARGE NOTE ADULT - CARE PROVIDER_API CALL
Jacinto Titus), Orthopaedic Surgery  [address] [place] [address]  Phone: [phone]  Fax: [phone]

## 2017-09-08 NOTE — PROGRESS NOTE ADULT - PROBLEM SELECTOR PROBLEM 3
CKD (chronic kidney disease) stage 3, GFR 30-59 ml/min
Obesity
Surgical wound infection, initial encounter
Seasonal allergic rhinitis, unspecified chronicity, unspecified trigger

## 2017-09-08 NOTE — PROGRESS NOTE ADULT - SUBJECTIVE AND OBJECTIVE BOX
Date/Time Patient Seen:  		  Referring MD:   Data Reviewed	       Patient is a 84y old  Male who presents with a chief complaint of "left hip" (02 Sep 2017 14:21)  in bed  seen and examined  vs and meds reviewed  on lasix and IVF  non compliant with NIPPV      Subjective/HPI     PAST MEDICAL & SURGICAL HISTORY:  Hearing loss  Osteoarthritis  Fluid retention in legs  Seasonal allergies  Prostate cancer: 48 treatments of RT  History of hip replacement, total, left  S/P inguinal hernia repair: 1961  S/P cholecystectomy: 1985  History of appendectomy        Medication list         MEDICATIONS  (STANDING):  influenza   Vaccine 0.5 milliLiter(s) IntraMuscular once  vancomycin  IVPB 1000 milliGRAM(s) IV Intermittent every 24 hours  apixaban 2.5 milliGRAM(s) Oral every 12 hours  doxazosin 4 milliGRAM(s) Oral at bedtime  loratadine 10 milliGRAM(s) Oral daily  bicalutamide 50 milliGRAM(s) Oral daily  metoprolol succinate ER 25 milliGRAM(s) Oral daily  pantoprazole    Tablet 40 milliGRAM(s) Oral before breakfast  docusate sodium 100 milliGRAM(s) Oral three times a day  dextrose 5% + sodium chloride 0.45%. 1000 milliLiter(s) (60 mL/Hr) IV Continuous <Continuous>  meropenem IVPB 1000 milliGRAM(s) IV Intermittent every 12 hours  meropenem IVPB   IV Intermittent   furosemide    Tablet 40 milliGRAM(s) Oral daily  chlorhexidine 4% Liquid 1 Application(s) Topical daily    MEDICATIONS  (PRN):  aluminum hydroxide/magnesium hydroxide/simethicone Suspension 30 milliLiter(s) Oral four times a day PRN Indigestion  ondansetron Injectable 4 milliGRAM(s) IV Push every 6 hours PRN Nausea and/or Vomiting  polyethylene glycol 3350 17 Gram(s) Oral daily PRN Constipation  bisacodyl Suppository 10 milliGRAM(s) Rectal daily PRN If no bowel movement by POD#2  senna 2 Tablet(s) Oral at bedtime PRN Constipation  magnesium hydroxide Suspension 30 milliLiter(s) Oral daily PRN Constipation  traMADol 25 milliGRAM(s) Oral three times a day PRN Moderate Pain (4 - 6)         Vitals log        ICU Vital Signs Last 24 Hrs  T(C): 36.4 (08 Sep 2017 05:32), Max: 37.1 (07 Sep 2017 17:30)  T(F): 97.6 (08 Sep 2017 05:32), Max: 98.7 (07 Sep 2017 17:30)  HR: 81 (08 Sep 2017 05:32) (75 - 86)  BP: 102/58 (08 Sep 2017 05:32) (102/58 - 148/71)  BP(mean): --  ABP: --  ABP(mean): --  RR: 18 (08 Sep 2017 05:32) (18 - 20)  SpO2: 94% (08 Sep 2017 05:32) (94% - 97%)           Input and Output:  I&O's Detail    06 Sep 2017 07:01  -  07 Sep 2017 07:00  --------------------------------------------------------  IN:    dextrose 5% + sodium chloride 0.45%.: 1260 mL    Oral Fluid: 490 mL    Solution: 100 mL  Total IN: 1850 mL    OUT:    Indwelling Catheter - Urethral: 500 mL    Voided: 675 mL  Total OUT: 1175 mL    Total NET: 675 mL      07 Sep 2017 07:01  -  08 Sep 2017 06:34  --------------------------------------------------------  IN:    dextrose 5% + sodium chloride 0.45%.: 1320 mL    Oral Fluid: 420 mL    Solution: 250 mL    Solution: 100 mL  Total IN: 2090 mL    OUT:    Voided: 1070 mL  Total OUT: 1070 mL    Total NET: 1020 mL          Lab Data                        8.9    7.4   )-----------( 242      ( 07 Sep 2017 07:39 )             27.1     09-07    138  |  102  |  24<H>  ----------------------------<  117<H>  3.7   |  33<H>  |  1.53<H>    Ca    8.5      07 Sep 2017 07:39              Review of Systems	      Objective     Physical Examination    head at  obese  heart - s1s2  lungs - dec BS      Pertinent Lab findings & Imaging      Kermit:  NO   Adequate UO     I&O's Detail    06 Sep 2017 07:01  -  07 Sep 2017 07:00  --------------------------------------------------------  IN:    dextrose 5% + sodium chloride 0.45%.: 1260 mL    Oral Fluid: 490 mL    Solution: 100 mL  Total IN: 1850 mL    OUT:    Indwelling Catheter - Urethral: 500 mL    Voided: 675 mL  Total OUT: 1175 mL    Total NET: 675 mL      07 Sep 2017 07:01  -  08 Sep 2017 06:34  --------------------------------------------------------  IN:    dextrose 5% + sodium chloride 0.45%.: 1320 mL    Oral Fluid: 420 mL    Solution: 250 mL    Solution: 100 mL  Total IN: 2090 mL    OUT:    Voided: 1070 mL  Total OUT: 1070 mL    Total NET: 1020 mL               Discussed with:     Cultures:	        Radiology

## 2017-09-08 NOTE — PROGRESS NOTE ADULT - PROVIDER SPECIALTY LIST ADULT
Cardiology
Hospitalist
Infectious Disease
Neurology
Orthopedics
Pulmonology
Neurology
Hospitalist

## 2017-09-08 NOTE — PROGRESS NOTE ADULT - PROBLEM SELECTOR PROBLEM 7
FABY (acute kidney injury)

## 2017-09-08 NOTE — PROGRESS NOTE ADULT - NEGATIVE GASTROINTESTINAL SYMPTOMS
no nausea/no abdominal pain/no vomiting
no nausea/no vomiting/no abdominal pain
no vomiting/no nausea/no abdominal pain
no vomiting/no abdominal pain/no nausea

## 2017-09-08 NOTE — PROGRESS NOTE ADULT - PROBLEM SELECTOR PROBLEM 2
Hypoventilation
Hypoventilation
Surgical wound infection, initial encounter
Other specified forms of hearing loss, unspecified laterality

## 2017-09-08 NOTE — PROGRESS NOTE ADULT - PROBLEM SELECTOR PLAN 9
likely due to pain meds.  pt is more awake today. back to baseline mental status.
likely due to pain meds.   will get CT head. c/w tele monitoring.  d/c dilaudid.

## 2017-09-08 NOTE — DISCHARGE NOTE ADULT - PLAN OF CARE
Improve ambulation, ADLs and quality of life PT/OT Total Hip Protocol; Ambulation, transfers & ADLs  TTWB Left LE; Walker/cane use as instructed by PT/OT  Anterior THR precautions for 3 weeks: No straight leg raise; No external rotation of hip when extended-standing or lying flat; No hyperextension of hip when standing (kickback)  Ice packs to hip.  Physical Therapy /Occupational Therapy  Total Hip Protocol   - Ambulation- Transfers  - ADLs (activities of daily living)  Posterior Total Hip Replacement precautions for 4 weeks  - Abduction pillow   - High hip chair for sitting  - No internal rotation,   - No crossing legs   - No sleeping on opposite sides  Anterior THR revised to Post THR with spacer, s/p I & D

## 2017-09-08 NOTE — PROGRESS NOTE ADULT - PROBLEM SELECTOR PLAN 10
improved..
not in distress. check ABG, CXR.  pulmonary consult- .

## 2017-09-08 NOTE — PROGRESS NOTE ADULT - SUBJECTIVE AND OBJECTIVE BOX
POST OPERATIVE DAY #:  4  STATUS POST: I & D Left hip, Revision Left THR with placement of cement spacer                       SUBJECTIVE: Patient seen and examined.    Reported Pain score = 5    OBJECTIVE:     Vital Signs Last 24 Hrs  T(C): 36.9 (08 Sep 2017 10:01), Max: 37.1 (07 Sep 2017 17:30)  T(F): 98.4 (08 Sep 2017 10:01), Max: 98.7 (07 Sep 2017 17:30)  HR: 82 (08 Sep 2017 10:01) (75 - 86)  BP: 102/64 (08 Sep 2017 10:01) (102/58 - 148/71)  RR: 16 (08 Sep 2017 10:01) (16 - 20)  SpO2: 94% (08 Sep 2017 10:01) (94% - 97%)    Left hip:          Dressing removed: anterior incision clean/intact with scant serous d/c, sutures in place. Posterior incision with scant serous d/c, clean and intact. Staples in place  Bilateral LEs:         Sensation:  intact to light touch :          Motor exam:  5/5 dorsiflexion/plantarflexion/EHL          2+ DP pulses          calf supple, NT    LABS:                        8.9    7.4   )-----------( 242      ( 07 Sep 2017 07:39 )             27.1     09-07    138  |  102  |  24<H>  ----------------------------<  117<H>  3.7   |  33<H>  |  1.53<H>    Ca    8.5      07 Sep 2017 07:39            MEDICATIONS:  Anticoagulation:  apixaban 2.5 milliGRAM(s) Oral every 12 hours      Pain medications:   traMADol 25 milliGRAM(s) Oral three times a day PRN        A/P :   Patient stable s/p I & D  & revision Left THR with cement spacer  POD # 4  -    Pain control  -    DVT ppx: Eliquis  -    Weight bearing status: TTWB   -    Continue total hip precautions  -    Physical Therapy  -    Occupational Therapy  -    Discharge plan: rehab today

## 2017-09-08 NOTE — DISCHARGE NOTE ADULT - MEDICATION SUMMARY - MEDICATIONS TO TAKE
I will START or STAY ON the medications listed below when I get home from the hospital:    acetaminophen 500 mg oral tablet  -- 2 tab(s) by mouth every 8 hours  -- Indication: For Pain    traMADol 50 mg oral tablet  -- 0.5 tab(s) by mouth 3 times a day, As needed, Moderate Pain (4 - 6)  -- Indication: For Pain     Milk of Magnesia  --  by mouth     400mg in 5ml po prn  -- Indication: For Constipation    doxazosin 4 mg oral tablet  -- 1 tab(s) by mouth once a day  -- Indication: For Per MD    apixaban 2.5 mg oral tablet  -- 1 tab(s) by mouth every 12 hours for a total 35 days post-op  -- Indication: For Prevention of blood clots    loratadine 10 mg oral tablet  -- 1 tab(s) by mouth once a day  -- Indication: For Allergies    Lupron Depot 22.5 mg/3 months intramuscular injection, extended release  --  intramuscular   -- Indication: For Per MD    bicalutamide 50 mg oral tablet  -- 1 tab(s) by mouth every 24 hours  -- Indication: For Per MD    metoprolol succinate 25 mg oral tablet, extended release  -- 1 tab(s) by mouth once a day  -- Indication: For High blood pressure    meropenem  --     -- Indication: For Antibiotic    furosemide 40 mg oral tablet  -- 1 tab(s) by mouth once a day  -- Indication: For diuretic    vancomycin 1 g intravenous injection  -- 1 gram(s) intravenous every 24 hours, monitor renal function  -- Indication: For Antibiotic    senna oral tablet  -- 2 tab(s) by mouth once a day (at bedtime)  -- Indication: For Constipation    docusate sodium 100 mg oral capsule  -- 1 cap(s) by mouth 3 times a day  -- Indication: For Stool softener    polyethylene glycol 3350 oral powder for reconstitution  -- 17 gram(s) by mouth once a day, As needed, Constipation  -- Indication: For Constipation    lactobacillus acidophilus oral capsule  --  by mouth   -- Indication: For Probiotic    pantoprazole 40 mg oral delayed release tablet  -- 1 tab(s) by mouth once a day (before a meal)  -- Indication: For Prevention of ulcers    fluticasone propionate  --     -- Indication: For Allergies    Centrum Silver oral tablet  -- 1 tab(s) by mouth once a day  -- Indication: For vitamin    Vitamin C 500 mg oral tablet  -- 1 tab(s) by mouth once a day  -- Indication: For vitamin

## 2017-09-08 NOTE — PROGRESS NOTE ADULT - ASSESSMENT
The patient is an 84 year old male with a history of HTN, OA, prostate cancer, left THR 8/2017 who presents with infected periprosthetic fracture s/p surgery.    Plan:  - Continue metoprolol succinate  - Echocardiogram with grossly normal LV systolic function, no significant valve issues  - On apixaban for DVT prophylaxis  - Continue furosemide 40 mg daily  - Discontinue fluids  - Discharge planning

## 2017-09-08 NOTE — PROGRESS NOTE ADULT - SUBJECTIVE AND OBJECTIVE BOX
Patient is a 84y old  Male who presents with a chief complaint of "left hip" (08 Sep 2017 10:15)      INTERVAL HPI/OVERNIGHT EVENTS:  Pt is seen and examined.  pain is controlled.    Pain Location & Control:     MEDICATIONS  (STANDING):  vancomycin  IVPB 1000 milliGRAM(s) IV Intermittent every 24 hours  apixaban 2.5 milliGRAM(s) Oral every 12 hours  doxazosin 4 milliGRAM(s) Oral at bedtime  loratadine 10 milliGRAM(s) Oral daily  bicalutamide 50 milliGRAM(s) Oral daily  metoprolol succinate ER 25 milliGRAM(s) Oral daily  pantoprazole    Tablet 40 milliGRAM(s) Oral before breakfast  docusate sodium 100 milliGRAM(s) Oral three times a day  meropenem IVPB 1000 milliGRAM(s) IV Intermittent every 12 hours  meropenem IVPB   IV Intermittent   furosemide    Tablet 40 milliGRAM(s) Oral daily  chlorhexidine 4% Liquid 1 Application(s) Topical daily    MEDICATIONS  (PRN):  aluminum hydroxide/magnesium hydroxide/simethicone Suspension 30 milliLiter(s) Oral four times a day PRN Indigestion  ondansetron Injectable 4 milliGRAM(s) IV Push every 6 hours PRN Nausea and/or Vomiting  polyethylene glycol 3350 17 Gram(s) Oral daily PRN Constipation  bisacodyl Suppository 10 milliGRAM(s) Rectal daily PRN If no bowel movement by POD#2  senna 2 Tablet(s) Oral at bedtime PRN Constipation  magnesium hydroxide Suspension 30 milliLiter(s) Oral daily PRN Constipation  traMADol 25 milliGRAM(s) Oral three times a day PRN Moderate Pain (4 - 6)      Allergies    No Known Allergies    Intolerances            Vital Signs Last 24 Hrs  T(C): 36.9 (08 Sep 2017 10:01), Max: 37.1 (07 Sep 2017 17:30)  T(F): 98.4 (08 Sep 2017 10:01), Max: 98.7 (07 Sep 2017 17:30)  HR: 82 (08 Sep 2017 10:01) (75 - 86)  BP: 102/64 (08 Sep 2017 10:01) (102/58 - 148/71)  BP(mean): --  RR: 16 (08 Sep 2017 10:01) (16 - 20)  SpO2: 94% (08 Sep 2017 10:01) (94% - 97%)        LABS:      Ca    8.5        07 Sep 2017 07:39              Cultures      RADIOLOGY & ADDITIONAL TESTS:    Imaging Personally Reviewed:  [ ] YES  [ ] NO    Consultant(s) Notes Reviewed:  [ ] YES  [ ] NO    Care Discussed with Consultants/Other Providers [ ] YES  [ ] NO Patient is a 84y old  Male who presents with a chief complaint of "left hip" (08 Sep 2017 10:15)      INTERVAL HPI/OVERNIGHT EVENTS:    s/p  Left  Hip  Revision THR, I&D, cement spacer   Pt is seen and examined.  pain is controlled.    Pain Location & Control:     MEDICATIONS  (STANDING):  vancomycin  IVPB 1000 milliGRAM(s) IV Intermittent every 24 hours  apixaban 2.5 milliGRAM(s) Oral every 12 hours  doxazosin 4 milliGRAM(s) Oral at bedtime  loratadine 10 milliGRAM(s) Oral daily  bicalutamide 50 milliGRAM(s) Oral daily  metoprolol succinate ER 25 milliGRAM(s) Oral daily  pantoprazole    Tablet 40 milliGRAM(s) Oral before breakfast  docusate sodium 100 milliGRAM(s) Oral three times a day  meropenem IVPB 1000 milliGRAM(s) IV Intermittent every 12 hours  meropenem IVPB   IV Intermittent   furosemide    Tablet 40 milliGRAM(s) Oral daily  chlorhexidine 4% Liquid 1 Application(s) Topical daily    MEDICATIONS  (PRN):  aluminum hydroxide/magnesium hydroxide/simethicone Suspension 30 milliLiter(s) Oral four times a day PRN Indigestion  ondansetron Injectable 4 milliGRAM(s) IV Push every 6 hours PRN Nausea and/or Vomiting  polyethylene glycol 3350 17 Gram(s) Oral daily PRN Constipation  bisacodyl Suppository 10 milliGRAM(s) Rectal daily PRN If no bowel movement by POD#2  senna 2 Tablet(s) Oral at bedtime PRN Constipation  magnesium hydroxide Suspension 30 milliLiter(s) Oral daily PRN Constipation  traMADol 25 milliGRAM(s) Oral three times a day PRN Moderate Pain (4 - 6)      Allergies    No Known Allergies    Intolerances            Vital Signs Last 24 Hrs  T(C): 36.9 (08 Sep 2017 10:01), Max: 37.1 (07 Sep 2017 17:30)  T(F): 98.4 (08 Sep 2017 10:01), Max: 98.7 (07 Sep 2017 17:30)  HR: 82 (08 Sep 2017 10:01) (75 - 86)  BP: 102/64 (08 Sep 2017 10:01) (102/58 - 148/71)  BP(mean): --  RR: 16 (08 Sep 2017 10:01) (16 - 20)  SpO2: 94% (08 Sep 2017 10:01) (94% - 97%)        LABS:      Ca    8.5        07 Sep 2017 07:39              Cultures      RADIOLOGY & ADDITIONAL TESTS:    Imaging Personally Reviewed:  [ ] YES  [ ] NO    Consultant(s) Notes Reviewed:  [ ] YES  [ ] NO    Care Discussed with Consultants/Other Providers [ ] YES  [ ] NO

## 2017-09-08 NOTE — PROGRESS NOTE ADULT - PROBLEM SELECTOR PLAN 1
at risk for DEJA  enc use empiric NIPPV  monitor sat  keep sat > 90 pct
resolving issue  cont use of NIPPV  caution with pain meds  on tramadol PRN  avoid opioids if possible
at risk for DEJA, O[facility]  work up as outpatient  empiric NIPPV in the hospital
c/w IV antibiotics vancomycin.  surgical plan as per ortho.  f/u culture report.  ID consult- is appriciated.  Ortho  (covering for ).
c/w IV antibiotics vancomycin, zosyn.  surgical plan as per ortho.  f/u culture report.  ID consult- is appriciated.  Ortho  (covering for ).
c/w IV antibiotics vancomycin.  surgical plan as per ortho.  f/u culture report.  ID consult- is appriciated.  Ortho  (covering for ).
c/w IV antibiotics vancomycin, meropenum..    ID consult- is appriciated.  neds 6weeks of iv antibiotics.  for PICC line today.  Ortho  (covering for ).
c/w IV antibiotics vancomycin, meropenum..  Cont Meropenam and Vancomycin till 10/18/17  Monitor renal fx closely while on Vancomycin  Aspiration precautions  Encourage pulm toileting  PICC line left arm.  Will need 6 weeks of IV antibx    needs 6weeks of iv antibiotics.  for PICC line today.  Ortho  (covering for ).
c/w IV antibiotics vancomycin, meropenum..  surgical plan as per ortho.  f/u culture report.  ID consult- is appriciated.  Ortho  (covering for ).

## 2017-09-08 NOTE — DISCHARGE NOTE ADULT - HOSPITAL COURSE
This patient was admitted to [facility] on 9/2/17, after transfer to [facility] from rehab, with incisional redness and pain s/p Left Anterior THR by Dr. Perry on 8/17/17.  Patient was found to have periprosthetic fracture and probable infected hardware. Patient was medically cleared to undergo I & D and revision Left THR. Patient underwent I & D Left THR with revision and placement of cement spacer by Dr. Jacinto Titus on 9/4/17. Procedure was well tolerated.  No operative or divina-operative complications arose during patients hospital course.  Patient is receiving antibiotics according to ID recommendations. Eliquis was given for DVT prophylaxis.  Anesthesia, Medical Hospitalist, Pulmonary, Infectious Disease, Physical Therapy and Occupational Therapy were consulted. Patient is stable for discharge with a good prognosis.  Appropriate discharge instructions and medications are provided in this document.

## 2017-09-08 NOTE — PROGRESS NOTE ADULT - PROBLEM SELECTOR PROBLEM 6
Periprosth fracture around internal prosth l hip jt, init

## 2017-09-08 NOTE — DISCHARGE NOTE ADULT - PATIENT PORTAL LINK FT
“You can access the [employer] Patient Portal, offered by [facility], by registering with the following website: [email or web address]”

## 2017-09-08 NOTE — PROGRESS NOTE ADULT - PROBLEM SELECTOR PLAN 8
avoid nephrotoxic meds.

## 2017-09-08 NOTE — PROGRESS NOTE ADULT - PROBLEM SELECTOR PROBLEM 4
Surgical wound infection, initial encounter
Primary osteoarthritis of both hips

## 2017-09-08 NOTE — PROGRESS NOTE ADULT - NSHPATTENDINGPLANDISCUSS_GEN_ALL_CORE
Patient. D/w covering nurse.
Patient. D/w covering nurse.
pt, rn,cardiology, pulmonary.
pt, rn, pulmonary.
pt, rn,family, pa,.
pt, rn,id
pt, rn,cardiology.
PT,RN.

## 2017-09-08 NOTE — PROGRESS NOTE ADULT - RESPIRATORY
Breath Sounds equal & clear to percussion & auscultation, no accessory muscle use

## 2017-09-08 NOTE — PROGRESS NOTE ADULT - SUBJECTIVE AND OBJECTIVE BOX
Chief Complaint: Periprosthetic fracture, infection    Interval Events: No events overnight.    Review of Systems:  General: No fevers, chills, weight loss or gain  Skin: No rashes, color changes  Cardiovascular: No chest pain, orthopnea  Respiratory: No shortness of breath, cough  Gastrointestinal: No nausea, abdominal pain  Genitourinary: No incontinence, pain with urination  Musculoskeletal: No pain, swelling, decreased range of motion  Neurological: No headache, weakness  Psychiatric: No depression, anxiety  Endocrine: No weight loss or gain, increased thirst  All other systems are comprehensively negative.    Physical Exam:  Vital Signs Last 24 Hrs  T(C): 36.9 (08 Sep 2017 10:01), Max: 37.1 (07 Sep 2017 17:30)  T(F): 98.4 (08 Sep 2017 10:01), Max: 98.7 (07 Sep 2017 17:30)  HR: 82 (08 Sep 2017 10:01) (75 - 86)  BP: 102/64 (08 Sep 2017 10:01) (102/58 - 148/71)  BP(mean): --  RR: 16 (08 Sep 2017 10:01) (16 - 20)  SpO2: 94% (08 Sep 2017 10:01) (94% - 97%)  General: NAD  Neck: No JVD, no carotid bruit  Lungs: CTAB  CV: RRR, nl S1/S2, no M/R/G  Abdomen: S/NT/ND, +BS  Extremities: No LE edema, no cyanosis    Labs:             09-07    138  |  102  |  24<H>  ----------------------------<  117<H>  3.7   |  33<H>  |  1.53<H>    Ca    8.5      07 Sep 2017 07:39                          8.9    7.4   )-----------( 242      ( 07 Sep 2017 07:39 )             27.1

## 2017-10-19 PROCEDURE — 96375 TX/PRO/DX INJ NEW DRUG ADDON: CPT

## 2017-10-19 PROCEDURE — 87206 SMEAR FLUORESCENT/ACID STAI: CPT

## 2017-10-19 PROCEDURE — 97165 OT EVAL LOW COMPLEX 30 MIN: CPT

## 2017-10-19 PROCEDURE — 90686 IIV4 VACC NO PRSV 0.5 ML IM: CPT

## 2017-10-19 PROCEDURE — 80053 COMPREHEN METABOLIC PANEL: CPT

## 2017-10-19 PROCEDURE — 83880 ASSAY OF NATRIURETIC PEPTIDE: CPT

## 2017-10-19 PROCEDURE — 97161 PT EVAL LOW COMPLEX 20 MIN: CPT

## 2017-10-19 PROCEDURE — 99285 EMERGENCY DEPT VISIT HI MDM: CPT

## 2017-10-19 PROCEDURE — 93005 ELECTROCARDIOGRAM TRACING: CPT

## 2017-10-19 PROCEDURE — 70450 CT HEAD/BRAIN W/O DYE: CPT

## 2017-10-19 PROCEDURE — 88300 SURGICAL PATH GROSS: CPT

## 2017-10-19 PROCEDURE — 85730 THROMBOPLASTIN TIME PARTIAL: CPT

## 2017-10-19 PROCEDURE — 87086 URINE CULTURE/COLONY COUNT: CPT

## 2017-10-19 PROCEDURE — 86901 BLOOD TYPING SEROLOGIC RH(D): CPT

## 2017-10-19 PROCEDURE — 83605 ASSAY OF LACTIC ACID: CPT

## 2017-10-19 PROCEDURE — 76000 FLUOROSCOPY <1 HR PHYS/QHP: CPT

## 2017-10-19 PROCEDURE — 87040 BLOOD CULTURE FOR BACTERIA: CPT

## 2017-10-19 PROCEDURE — 85610 PROTHROMBIN TIME: CPT

## 2017-10-19 PROCEDURE — 73501 X-RAY EXAM HIP UNI 1 VIEW: CPT

## 2017-10-19 PROCEDURE — 85027 COMPLETE CBC AUTOMATED: CPT

## 2017-10-19 PROCEDURE — 80048 BASIC METABOLIC PNL TOTAL CA: CPT

## 2017-10-19 PROCEDURE — 86850 RBC ANTIBODY SCREEN: CPT

## 2017-10-19 PROCEDURE — 96374 THER/PROPH/DIAG INJ IV PUSH: CPT

## 2017-10-19 PROCEDURE — 86140 C-REACTIVE PROTEIN: CPT

## 2017-10-19 PROCEDURE — 97110 THERAPEUTIC EXERCISES: CPT

## 2017-10-19 PROCEDURE — 36415 COLL VENOUS BLD VENIPUNCTURE: CPT

## 2017-10-19 PROCEDURE — 36569 INSJ PICC 5 YR+ W/O IMAGING: CPT

## 2017-10-19 PROCEDURE — 97535 SELF CARE MNGMENT TRAINING: CPT

## 2017-10-19 PROCEDURE — 97530 THERAPEUTIC ACTIVITIES: CPT

## 2017-10-19 PROCEDURE — C1889: CPT

## 2017-10-19 PROCEDURE — 87070 CULTURE OTHR SPECIMN AEROBIC: CPT

## 2017-10-19 PROCEDURE — 87116 MYCOBACTERIA CULTURE: CPT

## 2017-10-19 PROCEDURE — 93306 TTE W/DOPPLER COMPLETE: CPT

## 2017-10-19 PROCEDURE — 87015 SPECIMEN INFECT AGNT CONCNTJ: CPT

## 2017-10-19 PROCEDURE — 94640 AIRWAY INHALATION TREATMENT: CPT

## 2017-10-19 PROCEDURE — 86900 BLOOD TYPING SEROLOGIC ABO: CPT

## 2017-10-19 PROCEDURE — C1776: CPT

## 2017-10-19 PROCEDURE — 97116 GAIT TRAINING THERAPY: CPT

## 2017-10-19 PROCEDURE — 87186 SC STD MICRODIL/AGAR DIL: CPT

## 2017-10-19 PROCEDURE — 82803 BLOOD GASES ANY COMBINATION: CPT

## 2017-10-19 PROCEDURE — 85652 RBC SED RATE AUTOMATED: CPT

## 2017-10-19 PROCEDURE — 94660 CPAP INITIATION&MGMT: CPT

## 2017-10-19 PROCEDURE — 88311 DECALCIFY TISSUE: CPT

## 2017-10-19 PROCEDURE — 88305 TISSUE EXAM BY PATHOLOGIST: CPT

## 2017-10-19 PROCEDURE — 71045 X-RAY EXAM CHEST 1 VIEW: CPT

## 2017-10-19 PROCEDURE — 81001 URINALYSIS AUTO W/SCOPE: CPT

## 2017-10-19 PROCEDURE — 80202 ASSAY OF VANCOMYCIN: CPT

## 2017-10-19 PROCEDURE — 73502 X-RAY EXAM HIP UNI 2-3 VIEWS: CPT

## 2017-10-19 PROCEDURE — C1713: CPT

## 2017-10-25 LAB
CULTURE RESULTS: SIGNIFICANT CHANGE UP
SPECIMEN SOURCE: SIGNIFICANT CHANGE UP

## 2017-11-06 ENCOUNTER — OUTPATIENT (OUTPATIENT)
Dept: OUTPATIENT SERVICES | Facility: HOSPITAL | Age: 82
LOS: 1 days | End: 2017-11-06
Payer: MEDICARE

## 2017-11-06 DIAGNOSIS — Z96.642 PRESENCE OF LEFT ARTIFICIAL HIP JOINT: Chronic | ICD-10-CM

## 2017-11-06 DIAGNOSIS — Z90.49 ACQUIRED ABSENCE OF OTHER SPECIFIED PARTS OF DIGESTIVE TRACT: Chronic | ICD-10-CM

## 2017-11-06 DIAGNOSIS — M16.12 UNILATERAL PRIMARY OSTEOARTHRITIS, LEFT HIP: ICD-10-CM

## 2017-11-06 DIAGNOSIS — Z98.890 OTHER SPECIFIED POSTPROCEDURAL STATES: Chronic | ICD-10-CM

## 2017-11-06 LAB
B PERT IGG+IGM PNL SER: ABNORMAL
COLOR FLD: SIGNIFICANT CHANGE UP
EOSINOPHIL # FLD: 4 % — SIGNIFICANT CHANGE UP
FLUID INTAKE SUBSTANCE CLASS: SIGNIFICANT CHANGE UP
FLUID SEGMENTED GRANULOCYTES: 81 % — SIGNIFICANT CHANGE UP
GRAM STN FLD: SIGNIFICANT CHANGE UP
LYMPHOCYTES # FLD: 13 % — SIGNIFICANT CHANGE UP
MONOS+MACROS # FLD: 2 % — SIGNIFICANT CHANGE UP
RCV VOL RI: HIGH /UL (ref 0–5)
SPECIMEN SOURCE: SIGNIFICANT CHANGE UP
TOTAL NUCLEATED CELL COUNT, BODY FLUID: 637 /UL — HIGH (ref 0–5)
TUBE TYPE: SIGNIFICANT CHANGE UP

## 2017-11-06 PROCEDURE — 10030 IMG GID FLU COLL DRG SFT TIS: CPT

## 2017-11-06 PROCEDURE — 87070 CULTURE OTHR SPECIMN AEROBIC: CPT

## 2017-11-06 PROCEDURE — 77002 NEEDLE LOCALIZATION BY XRAY: CPT | Mod: 26

## 2017-11-06 PROCEDURE — 87205 SMEAR GRAM STAIN: CPT

## 2017-11-06 PROCEDURE — 89051 BODY FLUID CELL COUNT: CPT

## 2017-11-06 PROCEDURE — 76000 FLUOROSCOPY <1 HR PHYS/QHP: CPT

## 2017-11-06 PROCEDURE — 20610 DRAIN/INJ JOINT/BURSA W/O US: CPT | Mod: LT

## 2017-11-06 PROCEDURE — 87075 CULTR BACTERIA EXCEPT BLOOD: CPT

## 2017-11-08 PROBLEM — Z00.00 ENCOUNTER FOR PREVENTIVE HEALTH EXAMINATION: Status: ACTIVE | Noted: 2017-11-08

## 2017-11-11 LAB
CULTURE RESULTS: SIGNIFICANT CHANGE UP
SPECIMEN SOURCE: SIGNIFICANT CHANGE UP

## 2017-11-14 ENCOUNTER — APPOINTMENT (OUTPATIENT)
Dept: SURGERY | Facility: CLINIC | Age: 82
End: 2017-11-14

## 2017-11-24 VITALS
HEIGHT: 62 IN | HEART RATE: 76 BPM | SYSTOLIC BLOOD PRESSURE: 154 MMHG | DIASTOLIC BLOOD PRESSURE: 63 MMHG | RESPIRATION RATE: 16 BRPM | OXYGEN SATURATION: 92 % | WEIGHT: 197.09 LBS | TEMPERATURE: 97 F

## 2017-11-24 NOTE — PATIENT PROFILE ADULT. - ABILITY TO HEAR (WITH HEARING AID OR HEARING APPLIANCE IF NORMALLY USED):
Mildly to Moderately Impaired: difficulty hearing in some environments or speaker may need to increase volume or speak distinctly/uses bilateral hearing aids

## 2017-11-24 NOTE — PATIENT PROFILE ADULT. - PSH
History of appendectomy    S/P cholecystectomy  1985  S/P inguinal hernia repair  1961 Eye problem  corrective lens in the right  History of appendectomy    History of hip replacement, total, left  left hip er inserted secondary to infection and left upper IV present.  S/P cholecystectomy  1985  S/P inguinal hernia repair  1961 cannot remember which side

## 2017-11-27 ENCOUNTER — INPATIENT (INPATIENT)
Facility: HOSPITAL | Age: 82
LOS: 6 days | Discharge: NURSING, REHAB OR LONG-TERM CARE | DRG: 467 | End: 2017-12-04
Attending: ORTHOPAEDIC SURGERY | Admitting: ORTHOPAEDIC SURGERY
Payer: MEDICARE

## 2017-11-27 ENCOUNTER — RESULT REVIEW (OUTPATIENT)
Age: 82
End: 2017-11-27

## 2017-11-27 DIAGNOSIS — Z90.49 ACQUIRED ABSENCE OF OTHER SPECIFIED PARTS OF DIGESTIVE TRACT: Chronic | ICD-10-CM

## 2017-11-27 DIAGNOSIS — Z96.642 PRESENCE OF LEFT ARTIFICIAL HIP JOINT: Chronic | ICD-10-CM

## 2017-11-27 DIAGNOSIS — M00.9 PYOGENIC ARTHRITIS, UNSPECIFIED: ICD-10-CM

## 2017-11-27 DIAGNOSIS — H57.9 UNSPECIFIED DISORDER OF EYE AND ADNEXA: Chronic | ICD-10-CM

## 2017-11-27 DIAGNOSIS — Z98.890 OTHER SPECIFIED POSTPROCEDURAL STATES: Chronic | ICD-10-CM

## 2017-11-27 LAB
ALBUMIN SERPL ELPH-MCNC: 2.1 G/DL — LOW (ref 3.3–5)
ALP SERPL-CCNC: 144 U/L — HIGH (ref 40–120)
ALT FLD-CCNC: 82 U/L — HIGH (ref 10–45)
ANION GAP SERPL CALC-SCNC: 13 MMOL/L — SIGNIFICANT CHANGE UP (ref 5–17)
APTT BLD: 27 SEC — LOW (ref 27.5–37.4)
APTT BLD: 30.4 SEC — SIGNIFICANT CHANGE UP (ref 27.5–37.4)
AST SERPL-CCNC: 124 U/L — HIGH (ref 10–40)
BASE EXCESS BLDA CALC-SCNC: 1.3 MMOL/L — SIGNIFICANT CHANGE UP (ref -2–3)
BASE EXCESS BLDA CALC-SCNC: 1.5 MMOL/L — SIGNIFICANT CHANGE UP (ref -2–3)
BASE EXCESS BLDA CALC-SCNC: 3.3 MMOL/L — HIGH (ref -2–3)
BASE EXCESS BLDA CALC-SCNC: SIGNIFICANT CHANGE UP MMOL/L (ref -2–3)
BILIRUB SERPL-MCNC: 1.3 MG/DL — HIGH (ref 0.2–1.2)
BUN SERPL-MCNC: 26 MG/DL — HIGH (ref 7–23)
CA-I BLDA-SCNC: 1.05 MMOL/L — LOW (ref 1.12–1.3)
CA-I BLDA-SCNC: 1.18 MMOL/L — SIGNIFICANT CHANGE UP (ref 1.12–1.3)
CA-I BLDA-SCNC: SIGNIFICANT CHANGE UP MMOL/L (ref 1.12–1.3)
CALCIUM SERPL-MCNC: 8.9 MG/DL — SIGNIFICANT CHANGE UP (ref 8.4–10.5)
CHLORIDE SERPL-SCNC: 104 MMOL/L — SIGNIFICANT CHANGE UP (ref 96–108)
CO2 SERPL-SCNC: 24 MMOL/L — SIGNIFICANT CHANGE UP (ref 22–31)
COHGB MFR BLDA: 0.3 % — SIGNIFICANT CHANGE UP
COHGB MFR BLDA: 0.9 % — SIGNIFICANT CHANGE UP
COHGB MFR BLDA: SIGNIFICANT CHANGE UP %
CREAT SERPL-MCNC: 1.34 MG/DL — HIGH (ref 0.5–1.3)
GAS PNL BLDA: SIGNIFICANT CHANGE UP
GLUCOSE BLDC GLUCOMTR-MCNC: 140 MG/DL — HIGH (ref 70–99)
GLUCOSE BLDC GLUCOMTR-MCNC: 95 MG/DL — SIGNIFICANT CHANGE UP (ref 70–99)
GLUCOSE SERPL-MCNC: 159 MG/DL — HIGH (ref 70–99)
HCO3 BLDA-SCNC: 24 MMOL/L — SIGNIFICANT CHANGE UP (ref 21–28)
HCO3 BLDA-SCNC: 25 MMOL/L — SIGNIFICANT CHANGE UP (ref 21–28)
HCO3 BLDA-SCNC: 28 MMOL/L — SIGNIFICANT CHANGE UP (ref 21–28)
HCT VFR BLD CALC: 30.5 % — LOW (ref 39–50)
HCV AB S/CO SERPL IA: 0.13 S/CO — SIGNIFICANT CHANGE UP
HCV AB SERPL-IMP: SIGNIFICANT CHANGE UP
HGB BLD-MCNC: 10.4 G/DL — LOW (ref 13–17)
HGB BLDA-MCNC: 10.2 G/DL — LOW (ref 13–17)
HGB BLDA-MCNC: 12.5 G/DL — LOW (ref 13–17)
HGB BLDA-MCNC: SIGNIFICANT CHANGE UP G/DL (ref 13–17)
HIV 1+2 AB+HIV1 P24 AG SERPL QL IA: SIGNIFICANT CHANGE UP
INR BLD: 1.04 — SIGNIFICANT CHANGE UP (ref 0.88–1.16)
INR BLD: 1.25 — HIGH (ref 0.88–1.16)
LACTATE SERPL-SCNC: 1.8 MMOL/L — SIGNIFICANT CHANGE UP (ref 0.5–2)
MAGNESIUM SERPL-MCNC: 1.4 MG/DL — LOW (ref 1.6–2.6)
MCHC RBC-ENTMCNC: 28.9 PG — SIGNIFICANT CHANGE UP (ref 27–34)
MCHC RBC-ENTMCNC: 34.1 G/DL — SIGNIFICANT CHANGE UP (ref 32–36)
MCV RBC AUTO: 84.7 FL — SIGNIFICANT CHANGE UP (ref 80–100)
METHGB MFR BLDA: 0.1 % — SIGNIFICANT CHANGE UP
METHGB MFR BLDA: 0.3 % — SIGNIFICANT CHANGE UP
METHGB MFR BLDA: SIGNIFICANT CHANGE UP %
MRSA PCR RESULT.: POSITIVE
O2 CT VFR BLDA CALC: 15.3 ML/DL — SIGNIFICANT CHANGE UP (ref 15–23)
O2 CT VFR BLDA CALC: SIGNIFICANT CHANGE UP (ref 15–23)
O2 CT VFR BLDA CALC: SIGNIFICANT CHANGE UP ML/DL (ref 15–23)
OXYHGB MFR BLDA: 87 % — LOW (ref 94–100)
OXYHGB MFR BLDA: 99 % — SIGNIFICANT CHANGE UP (ref 94–100)
OXYHGB MFR BLDA: SIGNIFICANT CHANGE UP % (ref 94–100)
PCO2 BLDA: 32 MMHG — LOW (ref 35–48)
PCO2 BLDA: 36 MMHG — SIGNIFICANT CHANGE UP (ref 35–48)
PCO2 BLDA: 43 MMHG — SIGNIFICANT CHANGE UP (ref 35–48)
PCO2 BLDA: SIGNIFICANT CHANGE UP MMHG (ref 35–48)
PH BLDA: 7.43 — SIGNIFICANT CHANGE UP (ref 7.35–7.45)
PH BLDA: 7.46 — HIGH (ref 7.35–7.45)
PH BLDA: 7.5 — HIGH (ref 7.35–7.45)
PH BLDA: SIGNIFICANT CHANGE UP (ref 7.35–7.45)
PHOSPHATE SERPL-MCNC: 4.3 MG/DL — SIGNIFICANT CHANGE UP (ref 2.5–4.5)
PLATELET # BLD AUTO: 131 K/UL — LOW (ref 150–400)
PO2 BLDA: 380 MMHG — HIGH (ref 83–108)
PO2 BLDA: 54 MMHG — CRITICAL LOW (ref 83–108)
PO2 BLDA: 98 MMHG — SIGNIFICANT CHANGE UP (ref 83–108)
PO2 BLDA: SIGNIFICANT CHANGE UP MMHG (ref 83–108)
POTASSIUM BLDA-SCNC: 3.2 MMOL/L — LOW (ref 3.5–4.9)
POTASSIUM BLDA-SCNC: 3.8 MMOL/L — SIGNIFICANT CHANGE UP (ref 3.5–4.9)
POTASSIUM BLDA-SCNC: SIGNIFICANT CHANGE UP MMOL/L (ref 3.5–4.9)
POTASSIUM SERPL-MCNC: 3.2 MMOL/L — LOW (ref 3.5–5.3)
POTASSIUM SERPL-SCNC: 3.2 MMOL/L — LOW (ref 3.5–5.3)
PROT SERPL-MCNC: 4.2 G/DL — LOW (ref 6–8.3)
PROTHROM AB SERPL-ACNC: 11.6 SEC — SIGNIFICANT CHANGE UP (ref 9.8–12.7)
PROTHROM AB SERPL-ACNC: 13.9 SEC — HIGH (ref 9.8–12.7)
RBC # BLD: 3.6 M/UL — LOW (ref 4.2–5.8)
RBC # FLD: 14 % — SIGNIFICANT CHANGE UP (ref 10.3–16.9)
S AUREUS DNA NOSE QL NAA+PROBE: POSITIVE
SAO2 % BLDA: 100 % — SIGNIFICANT CHANGE UP (ref 95–100)
SAO2 % BLDA: 88 % — LOW (ref 95–100)
SAO2 % BLDA: 98 % — SIGNIFICANT CHANGE UP (ref 95–100)
SAO2 % BLDA: SIGNIFICANT CHANGE UP % (ref 95–100)
SODIUM BLDA-SCNC: 138 MMOL/L — SIGNIFICANT CHANGE UP (ref 138–146)
SODIUM BLDA-SCNC: 142 MMOL/L — SIGNIFICANT CHANGE UP (ref 138–146)
SODIUM BLDA-SCNC: SIGNIFICANT CHANGE UP MMOL/L (ref 138–146)
SODIUM SERPL-SCNC: 141 MMOL/L — SIGNIFICANT CHANGE UP (ref 135–145)
WBC # BLD: 8.8 K/UL — SIGNIFICANT CHANGE UP (ref 3.8–10.5)
WBC # FLD AUTO: 8.8 K/UL — SIGNIFICANT CHANGE UP (ref 3.8–10.5)

## 2017-11-27 PROCEDURE — 73502 X-RAY EXAM HIP UNI 2-3 VIEWS: CPT | Mod: 26

## 2017-11-27 PROCEDURE — 71010: CPT | Mod: 26

## 2017-11-27 PROCEDURE — 93010 ELECTROCARDIOGRAM REPORT: CPT

## 2017-11-27 RX ORDER — DEXTROSE 50 % IN WATER 50 %
25 SYRINGE (ML) INTRAVENOUS ONCE
Qty: 0 | Refills: 0 | Status: DISCONTINUED | OUTPATIENT
Start: 2017-11-27 | End: 2017-12-04

## 2017-11-27 RX ORDER — HYDROMORPHONE HYDROCHLORIDE 2 MG/ML
0.5 INJECTION INTRAMUSCULAR; INTRAVENOUS; SUBCUTANEOUS
Qty: 0 | Refills: 0 | Status: DISCONTINUED | OUTPATIENT
Start: 2017-11-27 | End: 2017-11-27

## 2017-11-27 RX ORDER — ASCORBIC ACID 60 MG
1 TABLET,CHEWABLE ORAL
Qty: 0 | Refills: 0 | COMMUNITY

## 2017-11-27 RX ORDER — ASPIRIN/CALCIUM CARB/MAGNESIUM 324 MG
325 TABLET ORAL
Qty: 0 | Refills: 0 | Status: DISCONTINUED | OUTPATIENT
Start: 2017-11-27 | End: 2017-12-04

## 2017-11-27 RX ORDER — DEXTROSE 50 % IN WATER 50 %
1 SYRINGE (ML) INTRAVENOUS ONCE
Qty: 0 | Refills: 0 | Status: DISCONTINUED | OUTPATIENT
Start: 2017-11-27 | End: 2017-12-04

## 2017-11-27 RX ORDER — LACTOBACILLUS ACIDOPHILUS 100MM CELL
1 CAPSULE ORAL DAILY
Qty: 0 | Refills: 0 | Status: DISCONTINUED | OUTPATIENT
Start: 2017-11-27 | End: 2017-12-04

## 2017-11-27 RX ORDER — ONDANSETRON 8 MG/1
4 TABLET, FILM COATED ORAL EVERY 6 HOURS
Qty: 0 | Refills: 0 | Status: DISCONTINUED | OUTPATIENT
Start: 2017-11-27 | End: 2017-12-04

## 2017-11-27 RX ORDER — MAGNESIUM SULFATE 500 MG/ML
2 VIAL (ML) INJECTION ONCE
Qty: 0 | Refills: 0 | Status: COMPLETED | OUTPATIENT
Start: 2017-11-27 | End: 2017-11-28

## 2017-11-27 RX ORDER — BUPIVACAINE 13.3 MG/ML
20 INJECTION, SUSPENSION, LIPOSOMAL INFILTRATION ONCE
Qty: 0 | Refills: 0 | Status: DISCONTINUED | OUTPATIENT
Start: 2017-11-27 | End: 2017-11-28

## 2017-11-27 RX ORDER — POTASSIUM CHLORIDE 20 MEQ
10 PACKET (EA) ORAL
Qty: 0 | Refills: 0 | Status: COMPLETED | OUTPATIENT
Start: 2017-11-27 | End: 2017-11-28

## 2017-11-27 RX ORDER — MULTIVIT-MIN/FERROUS GLUCONATE 9 MG/15 ML
1 LIQUID (ML) ORAL
Qty: 0 | Refills: 0 | COMMUNITY

## 2017-11-27 RX ORDER — PIPERACILLIN AND TAZOBACTAM 4; .5 G/20ML; G/20ML
3.38 INJECTION, POWDER, LYOPHILIZED, FOR SOLUTION INTRAVENOUS EVERY 6 HOURS
Qty: 0 | Refills: 0 | Status: DISCONTINUED | OUTPATIENT
Start: 2017-11-27 | End: 2017-11-30

## 2017-11-27 RX ORDER — FENTANYL CITRATE 50 UG/ML
0.28 INJECTION INTRAVENOUS
Qty: 2500 | Refills: 0 | Status: DISCONTINUED | OUTPATIENT
Start: 2017-11-27 | End: 2017-11-28

## 2017-11-27 RX ORDER — GLUCAGON INJECTION, SOLUTION 0.5 MG/.1ML
1 INJECTION, SOLUTION SUBCUTANEOUS ONCE
Qty: 0 | Refills: 0 | Status: DISCONTINUED | OUTPATIENT
Start: 2017-11-27 | End: 2017-12-04

## 2017-11-27 RX ORDER — INSULIN LISPRO 100/ML
VIAL (ML) SUBCUTANEOUS EVERY 6 HOURS
Qty: 0 | Refills: 0 | Status: DISCONTINUED | OUTPATIENT
Start: 2017-11-27 | End: 2017-11-30

## 2017-11-27 RX ORDER — PANTOPRAZOLE SODIUM 20 MG/1
40 TABLET, DELAYED RELEASE ORAL DAILY
Qty: 0 | Refills: 0 | Status: DISCONTINUED | OUTPATIENT
Start: 2017-11-27 | End: 2017-11-29

## 2017-11-27 RX ORDER — PROPOFOL 10 MG/ML
5.59 INJECTION, EMULSION INTRAVENOUS
Qty: 1000 | Refills: 0 | Status: DISCONTINUED | OUTPATIENT
Start: 2017-11-27 | End: 2017-11-28

## 2017-11-27 RX ORDER — FUROSEMIDE 40 MG
40 TABLET ORAL DAILY
Qty: 0 | Refills: 0 | Status: DISCONTINUED | OUTPATIENT
Start: 2017-11-27 | End: 2017-11-28

## 2017-11-27 RX ORDER — OXYCODONE HYDROCHLORIDE 5 MG/1
10 TABLET ORAL ONCE
Qty: 0 | Refills: 0 | Status: DISCONTINUED | OUTPATIENT
Start: 2017-11-27 | End: 2017-11-27

## 2017-11-27 RX ORDER — METOPROLOL TARTRATE 50 MG
1 TABLET ORAL
Qty: 0 | Refills: 0 | COMMUNITY

## 2017-11-27 RX ORDER — SODIUM CHLORIDE 9 MG/ML
1000 INJECTION INTRAMUSCULAR; INTRAVENOUS; SUBCUTANEOUS
Qty: 0 | Refills: 0 | Status: DISCONTINUED | OUTPATIENT
Start: 2017-11-27 | End: 2017-11-29

## 2017-11-27 RX ORDER — DEXAMETHASONE 0.5 MG/5ML
10 ELIXIR ORAL ONCE
Qty: 0 | Refills: 0 | Status: DISCONTINUED | OUTPATIENT
Start: 2017-11-27 | End: 2017-11-27

## 2017-11-27 RX ORDER — SODIUM CHLORIDE 9 MG/ML
1000 INJECTION, SOLUTION INTRAVENOUS
Qty: 0 | Refills: 0 | Status: DISCONTINUED | OUTPATIENT
Start: 2017-11-27 | End: 2017-12-04

## 2017-11-27 RX ORDER — CELECOXIB 200 MG/1
400 CAPSULE ORAL ONCE
Qty: 0 | Refills: 0 | Status: COMPLETED | OUTPATIENT
Start: 2017-11-27 | End: 2017-11-27

## 2017-11-27 RX ORDER — LORATADINE 10 MG/1
10 TABLET ORAL DAILY
Qty: 0 | Refills: 0 | Status: DISCONTINUED | OUTPATIENT
Start: 2017-11-27 | End: 2017-11-29

## 2017-11-27 RX ORDER — PANTOPRAZOLE SODIUM 20 MG/1
40 TABLET, DELAYED RELEASE ORAL
Qty: 0 | Refills: 0 | Status: DISCONTINUED | OUTPATIENT
Start: 2017-11-27 | End: 2017-11-28

## 2017-11-27 RX ORDER — VANCOMYCIN HCL 1 G
1500 VIAL (EA) INTRAVENOUS EVERY 24 HOURS
Qty: 0 | Refills: 0 | Status: DISCONTINUED | OUTPATIENT
Start: 2017-11-27 | End: 2017-11-30

## 2017-11-27 RX ORDER — DEXTROSE 50 % IN WATER 50 %
12.5 SYRINGE (ML) INTRAVENOUS ONCE
Qty: 0 | Refills: 0 | Status: DISCONTINUED | OUTPATIENT
Start: 2017-11-27 | End: 2017-12-04

## 2017-11-27 RX ORDER — CHLORHEXIDINE GLUCONATE 213 G/1000ML
15 SOLUTION TOPICAL
Qty: 0 | Refills: 0 | Status: DISCONTINUED | OUTPATIENT
Start: 2017-11-27 | End: 2017-11-28

## 2017-11-27 RX ORDER — BICALUTAMIDE 50 MG/1
50 TABLET, FILM COATED ORAL DAILY
Qty: 0 | Refills: 0 | Status: DISCONTINUED | OUTPATIENT
Start: 2017-11-27 | End: 2017-11-29

## 2017-11-27 RX ORDER — GABAPENTIN 400 MG/1
100 CAPSULE ORAL ONCE
Qty: 0 | Refills: 0 | Status: COMPLETED | OUTPATIENT
Start: 2017-11-27 | End: 2017-11-27

## 2017-11-27 RX ORDER — MAGNESIUM HYDROXIDE 400 MG/1
0 TABLET, CHEWABLE ORAL
Qty: 0 | Refills: 0 | COMMUNITY

## 2017-11-27 RX ORDER — DOXAZOSIN MESYLATE 4 MG
4 TABLET ORAL AT BEDTIME
Qty: 0 | Refills: 0 | Status: DISCONTINUED | OUTPATIENT
Start: 2017-11-27 | End: 2017-11-28

## 2017-11-27 RX ADMIN — GABAPENTIN 100 MILLIGRAM(S): 400 CAPSULE ORAL at 14:30

## 2017-11-27 RX ADMIN — OXYCODONE HYDROCHLORIDE 10 MILLIGRAM(S): 5 TABLET ORAL at 14:30

## 2017-11-27 RX ADMIN — CELECOXIB 400 MILLIGRAM(S): 200 CAPSULE ORAL at 14:30

## 2017-11-27 RX ADMIN — PROPOFOL 3 MICROGRAM(S)/KG/MIN: 10 INJECTION, EMULSION INTRAVENOUS at 23:26

## 2017-11-27 NOTE — BRIEF OPERATIVE NOTE - PROCEDURE
<<-----Click on this checkbox to enter Procedure Total hip replacement  11/27/2017  Revision total hip replacement  Active  EILEEN

## 2017-11-27 NOTE — BRIEF OPERATIVE NOTE - POST-OP DX
Hip joint effusion, left  11/27/2017  Explant of spacer, exchange arthroplasty L hip  Active  Ace Raza

## 2017-11-27 NOTE — H&P ADULT - HISTORY OF PRESENT ILLNESS
84 y.o. male s/p left hip spacer placement for infected left JOANNA. Patient had initial left JOANNA on 8/17/17 and revision/spacer placement on 9/4/17 at [facility]. Patient states pain to left hip is 4/10, and c/o numbness to b/l big toes due his sheets feeling too tight. Patient has been in a rehab center and has not been walking.   Denies tingling, f/c/n/v/c/d. States he feels otherwise well.    Patient had aspiration of left hip joint 11/6/17 that did not show growth. He has been treated with abx.  Presents today to reimplantation of left JOANNA.

## 2017-11-27 NOTE — H&P ADULT - PROBLEM SELECTOR PLAN 1
1. Admit to Dr. Titus's service  Plan for revision left JOANNA    Medically stable and cleared for surgery by Dr. Garcia.

## 2017-11-27 NOTE — CONSULT NOTE ADULT - SUBJECTIVE AND OBJECTIVE BOX
HPI:  84 y.o. male s/p left hip spacer placement for infected left JOANNA. Patient had initial left JOANNA on 8/17/17 and revision/spacer placement on 9/4/17 at [facility]. Patient states pain to left hip is 4/10, and c/o numbness to b/l big toes due his sheets feeling too tight. Patient has been in a rehab center and has not been walking.   Denies tingling, f/c/n/v/c/d. States he feels otherwise well.    Patient had aspiration of left hip joint 11/6/17 that did not show growth. He has been treated with abx.  Presents today to reimplantation of left JOANNA.     He underwent left JOANNA on 11/27/2017. Briefly before intubation patient desaturated to 88%, pO2 at that time 50. Intra-op patient received 2 u pRBC due to 850cc EBL, otherwise there were no acute complications. Post-operatively patient was transferred to SICU, remains intubated and sedated, hemodynamically stable.       PAST MEDICAL & SURGICAL HISTORY:  Hearing loss  Osteoarthritis  Fluid retention in legs  Seasonal allergies  Prostate cancer: 48 treatments of RT  Eye problem: corrective lens in the right  History of hip replacement, total, 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  S/P inguinal hernia repair: 1961 cannot remember which side  S/P cholecystectomy: 1985  History of appendectomy      ROS: See HPI    MEDICATIONS:  ALLERGIES:    SOCIAL HISTORY:  Smoke: Never Smoker  EtOH: occasional    Vital Signs Last 24 Hrs  T(C): --  T(F): --  HR: --  BP: --  BP(mean): --  RR: --  SpO2: --    PHYSICAL EXAM  Gen: in no distress, intubated, sedated  CV: normocardic, normotensive  Pulm: intubated, on AC 40/440/10/5  Abd: Soft, NT/ND  Extremities: L hip with provena vac in place, no surrounding erythema or edema, c.d.i    LABS:      PT/INR - ( 27 Nov 2017 11:22 )   PT: 11.6 sec;   INR: 1.04          PTT - ( 27 Nov 2017 11:22 )  PTT:30.4 sec      RADIOLOGY & ADDITIONAL STUDIES:  < from: Xray Hip 1 View Intraoperative, Left (11.27.17 @ 19:46) >  FINDINGS: A single AP view of the right hip is submitted for review.   There is evidence of a right total hip arthroplasty. The hardware appears   intact. Surgical clips are overlying the right hip and pubic symphysis.   No acute fracture. No significant soft tissue abnormality.    IMPRESSION: Status post right total hip arthroplasty.      ASSESSMENT AND PLAN:  84y Male s/p Revision Left THR    Neuro: Propofol, Fentanyl  CV: MAP goal 65-70,  BID  Pulm: intubated AC 40/440/10/5  GI/FEN:NPO, NS@100, Protonix, Zofran  : Kermit  ID: Vanc/Zosyn (11/27--)  Endo: ISS  Heme: s/p 2 u pRBC,  BID  PPX: SCDs, Protonix, Peridex  Lines: FLORIDALMA Gill (11/27--), PIV, PICC  Wound: provena vac HPI:  84 y.o. male s/p left hip spacer placement for infected left JOANNA. Patient had initial left JOANNA on 8/17/17 and revision/spacer placement on 9/4/17 at [facility]. Patient states pain to left hip is 4/10, and c/o numbness to b/l big toes due his sheets feeling too tight. Patient has been in a rehab center and has not been walking.   Denies tingling, f/c/n/v/c/d. States he feels otherwise well.    Patient had aspiration of left hip joint 11/6/17 that did not show growth. He has been treated with abx.  Presents today to reimplantation of left JOANNA.     He underwent left JOANNA on 11/27/2017. Briefly before intubation patient desaturated to 88%, pO2 at that time 54. Intra-op patient received 2 u pRBC due to 850cc EBL, otherwise there were no acute complications. Post-operatively patient was transferred to SICU, remains intubated and sedated, hemodynamically stable.       PAST MEDICAL & SURGICAL HISTORY:  Hearing loss  Osteoarthritis  Fluid retention in legs  Seasonal allergies  Prostate cancer: 48 treatments of RT  Eye problem: corrective lens in the right  History of hip replacement, total, 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  S/P inguinal hernia repair: 1961 cannot remember which side  S/P cholecystectomy: 1985  History of appendectomy      ROS: See HPI    MEDICATIONS:  ALLERGIES:    SOCIAL HISTORY:  Smoke: Never Smoker  EtOH: occasional    Vital Signs Last 24 Hrs  T(C): --  T(F): --  HR: --  BP: --  BP(mean): --  RR: --  SpO2: --    PHYSICAL EXAM  Gen: in no distress, intubated, sedated  CV: normocardic, normotensive  Pulm: intubated, on AC 40/440/10/5  Abd: Soft, NT/ND  Extremities: L hip with provena vac in place, no surrounding erythema or edema, c.d.i    LABS:      PT/INR - ( 27 Nov 2017 11:22 )   PT: 11.6 sec;   INR: 1.04          PTT - ( 27 Nov 2017 11:22 )  PTT:30.4 sec      RADIOLOGY & ADDITIONAL STUDIES:  < from: Xray Hip 1 View Intraoperative, Left (11.27.17 @ 19:46) >  FINDINGS: A single AP view of the right hip is submitted for review.   There is evidence of a right total hip arthroplasty. The hardware appears   intact. Surgical clips are overlying the right hip and pubic symphysis.   No acute fracture. No significant soft tissue abnormality.    IMPRESSION: Status post right total hip arthroplasty.      ASSESSMENT AND PLAN:  84y Male s/p Revision Left THR    Neuro: Propofol, Fentanyl  CV: MAP goal 65-70,  BID  Pulm: intubated AC 40/440/10/5  GI/FEN:NPO, NS@100, Protonix, Zofran  : Kermit  ID: Vanc/Zosyn (11/27--)  Endo: ISS  Heme: s/p 2 u pRBC,  BID  PPX: SCDs, Protonix, Peridex  Lines: FLORIDALMA Gill (11/27--), PIV, PICC  Wound: provena vac HPI:  84 y.o. male s/p left hip spacer placement for infected left JOANNA. Patient had initial left JOANNA on 8/17/17 and revision/spacer placement on 9/4/17 at [facility]. Patient states pain to left hip is 4/10, and c/o numbness to b/l big toes due his sheets feeling too tight. Patient has been in a rehab center and has not been walking.   Denies tingling, f/c/n/v/c/d. States he feels otherwise well.    Patient had aspiration of left hip joint 11/6/17 that did not show growth. He has been treated with abx.  Presents today to reimplantation of left JOANNA.     He underwent left JOANNA on 11/27/2017. Briefly before intubation patient desaturated to 88%, pO2 at that time 54. Intra-op patient received 2 u pRBC for 850cc EBL, otherwise there were no acute complications. Post-operatively patient was transferred to SICU, remains intubated and sedated, hemodynamically stable.       PAST MEDICAL & SURGICAL HISTORY:  Hearing loss  Osteoarthritis  Fluid retention in legs  Seasonal allergies  Prostate cancer: 48 treatments of RT  Eye problem: corrective lens in the right  History of hip replacement, total, 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  S/P inguinal hernia repair: 1961 cannot remember which side  S/P cholecystectomy: 1985  History of appendectomy      ROS: See HPI    MEDICATIONS:  ALLERGIES:    SOCIAL HISTORY:  Smoke: Never Smoker  EtOH: occasional    Vital Signs Last 24 Hrs  T(C): --  T(F): --  HR: --  BP: --  BP(mean): --  RR: --  SpO2: --    PHYSICAL EXAM  Gen: in no distress, intubated, sedated  CV: normocardic, normotensive  Pulm: intubated, on AC 40/440/10/5  Abd: Soft, NT/ND  Extremities: L hip with provena vac in place, no surrounding erythema or edema, c.d.i    LABS:      PT/INR - ( 27 Nov 2017 11:22 )   PT: 11.6 sec;   INR: 1.04          PTT - ( 27 Nov 2017 11:22 )  PTT:30.4 sec      RADIOLOGY & ADDITIONAL STUDIES:  < from: Xray Hip 1 View Intraoperative, Left (11.27.17 @ 19:46) >  FINDINGS: A single AP view of the right hip is submitted for review.   There is evidence of a right total hip arthroplasty. The hardware appears   intact. Surgical clips are overlying the right hip and pubic symphysis.   No acute fracture. No significant soft tissue abnormality.    IMPRESSION: Status post right total hip arthroplasty.      ASSESSMENT AND PLAN:  84y Male s/p Revision Left THR    Neuro: Propofol, Fentanyl  CV: MAP goal 65-70,  BID  Pulm: intubated AC 40/440/10/5  GI/FEN:NPO, NS@100, Protonix, Zofran  : Kermit  ID: Vanc/Zosyn (11/27--)  Endo: ISS  Heme: s/p 2 u pRBC,  BID  PPX: SCDs, Protonix, Peridex  Lines: FLORIDALMA Gill (11/27--), PIV, PICC  Wound: provena vac HPI:  84 y.o. male s/p left hip spacer placement for infected left JOANNA. Patient had initial left JOANNA on 8/17/17 and revision/spacer placement on 9/4/17 at [facility]. Patient states pain to left hip is 4/10, and c/o numbness to b/l big toes due his sheets feeling too tight. Patient has been in a rehab center and has not been walking.   Denies tingling, f/c/n/v/c/d. States he feels otherwise well.    Patient had aspiration of left hip joint 11/6/17 that did not show growth. He has been treated with abx.  Presents today to reimplantation of left JOANNA.     He underwent left JOANNA on 11/27/2017. Briefly before intubation patient desaturated to 88%, pO2 at that time 54. Intra-op patient received 2 u pRBC for 850cc EBL, otherwise there were no acute complications. Post-operatively patient was transferred to SICU, remains intubated and sedated, hemodynamically stable.       PAST MEDICAL & SURGICAL HISTORY:  Hearing loss  Osteoarthritis  Fluid retention in legs  Seasonal allergies  Prostate cancer: 48 treatments of RT  Eye problem: corrective lens in the right  History of hip replacement, total, 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  S/P inguinal hernia repair: 1961 cannot remember which side  S/P cholecystectomy: 1985  History of appendectomy      ROS: See HPI    MEDICATIONS:  ALLERGIES:    SOCIAL HISTORY:  Smoke: Never Smoker  EtOH: occasional    Vital Signs Last 24 Hrs  T(C): --  T(F): --  HR: --  BP: --  BP(mean): --  RR: --  SpO2: --    PHYSICAL EXAM  Gen: in no distress, intubated, sedated  CV: normocardic, normotensive  Pulm: intubated, on AC 40/440/10/5, mildly coarse breath sound on the left  Abd: Soft, obese, ND/NT throughout, no rebound or guarding  Extremities: L hip with provena vac in place, no surrounding erythema or edema, c.d.i    LABS:      PT/INR - ( 27 Nov 2017 11:22 )   PT: 11.6 sec;   INR: 1.04          PTT - ( 27 Nov 2017 11:22 )  PTT:30.4 sec      RADIOLOGY & ADDITIONAL STUDIES:  < from: Xray Hip 1 View Intraoperative, Left (11.27.17 @ 19:46) >  FINDINGS: A single AP view of the right hip is submitted for review.   There is evidence of a right total hip arthroplasty. The hardware appears   intact. Surgical clips are overlying the right hip and pubic symphysis.   No acute fracture. No significant soft tissue abnormality.    IMPRESSION: Status post right total hip arthroplasty.      ASSESSMENT AND PLAN:  84y Male s/p Revision Left THR    Neuro: Propofol, Fentanyl  CV: MAP goal 65-70,  BID  Pulm: intubated AC 40/440/10/5  GI/FEN:NPO, NS@100, Protonix, Zofran  : Kermit  ID: Vanc/Zosyn (11/27--)  Endo: ISS  Heme: s/p 2 u pRBC,  BID  PPX: SCDs, Protonix, Peridex  Lines: FLORIDALMA Gill (11/27--), PIV, PICC  Wound: provena vac

## 2017-11-27 NOTE — H&P ADULT - NSHPPHYSICALEXAM_GEN_ALL_CORE
PE: Patient seen laying in stretcher this morning in no acute distress. A+O x 3  Normal head, atraumatic. Normal skin, no rashes/lesions/erythema.    MSK: No deformity or open wounds. No rashes or lesions. EHL/TA/GS/FHL 5/5 BLE. Sensation intact and equal BLE. Skin warm and well perfused. DP palpable BLE. Cap refill brisk.     Rest of PE per medical clearance.

## 2017-11-27 NOTE — CONSULT NOTE ADULT - ATTENDING COMMENTS
history as per dr. landry. note ROS is negative other than for positives noted in HPI. family history is negative.   1. sp prolonged surgery. obese patient. will maintain on mech vent in immediate post op period given risk of atelectasis. his Vt are relatively small given obesity as reviewed on CXR. gas exchange adequate on ABG. cont iv sedation and analgesia for comfort onmech vent  2. cont broad specdtrum abx given recent abx. intra op cx to be reviewed when resulted.  3. Cr decreased and UO adequate suggesting adequate perfusion.   4. plan to continue his usual lopressor in am if extubated. if not able to tolerate po will resume as IV lopressor   5. sq insulinf or glycemic control to optimize wound healing history as per dr. landry. note ROS is negative other than for positives noted in HPI. family history is negative.   1. sp prolonged surgery. obese patient. will maintain on mech vent in immediate post op period given risk of atelectasis. his Vt are relatively small given obesity as reviewed on CXR. gas exchange adequate on ABG. cont iv sedation and analgesia for comfort onmech vent  2. cont broad specdtrum abx given recent abx. intra op cx to be reviewed when resulted.  3. CKD with usual Cr > 1.5 but post op Cr decreased and UO adequate suggesting adequate perfusion.   4. plan to continue his usual lopressor in am if extubated. if not able to tolerate po will resume as IV lopressor   5. sq insulinf or glycemic control to optimize wound healing

## 2017-11-27 NOTE — H&P ADULT - PSH
Eye problem  corrective lens in the right  History of appendectomy    History of hip replacement, total, left  left hip er inserted secondary to infection and left upper IV present.  S/P cholecystectomy  1985  S/P inguinal hernia repair  1961 cannot remember which side

## 2017-11-27 NOTE — PROGRESS NOTE ADULT - SUBJECTIVE AND OBJECTIVE BOX
Orthopaedics Post Op Check    Procedure: L hip revision  Surgeon: Tacho Mayen comfortable, intubated      Vital Signs Last 24 Hrs  T(C): --  T(F): --  HR: 70 (27 Nov 2017 22:42) (70 - 72)  BP: 145/64 (27 Nov 2017 22:11) (145/64 - 163/65)  BP(mean): 86 (27 Nov 2017 22:11) (86 - 95)  RR: 12 (27 Nov 2017 22:11) (12 - 26)  SpO2: 94% (27 Nov 2017 22:42) (94% - 100%)  AVSS, NAD, intubated    Dressing C/D/I, Prevena   General: sedated, intubated    Pulses: WWP, DP/PT 2+  Sensation: unable to assess  Motor: unable to assess                          10.4   8.8   )-----------( 131      ( 27 Nov 2017 22:47 )             30.5   11-27    141  |  104  |  26<H>  ----------------------------<  159<H>  3.2<L>   |  24  |  1.34<H>    Ca    8.9      27 Nov 2017 22:47  Phos  4.3     11-27  Mg     1.4     11-27    TPro  4.2<L>  /  Alb  2.1<L>  /  TBili  1.3<H>  /  DBili  x   /  AST  124<H>  /  ALT  82<H>  /  AlkPhos  144<H>  11-27    Post op XR: no fracture or foreign body    A/P: 84yMale POD#0 s/p above procedure  - Stable  - Pain Control  - DVT ppx: ASA  - Post op abx: Vanc, Zosyn  - PT, WBS:  WBAT  - F/U AM Labs    Cesario Brooks MD, PGY-2  [phone]

## 2017-11-27 NOTE — H&P ADULT - NSHPLABSRESULTS_GEN_ALL_CORE
Preop CBC, BMP, UA within normal limits- reviewed by medical clearance.   Coags sent DOS.  CXR: Within normal limits, per medical clearance.   EKG to be done DOS.  2D echo, EF: 55%. Report in chart and reviewed by medical clearance.

## 2017-11-28 ENCOUNTER — TRANSCRIPTION ENCOUNTER (OUTPATIENT)
Age: 82
End: 2017-11-28

## 2017-11-28 LAB
ALBUMIN SERPL ELPH-MCNC: 2.5 G/DL — LOW (ref 3.3–5)
ALP SERPL-CCNC: 142 U/L — HIGH (ref 40–120)
ALT FLD-CCNC: 69 U/L — HIGH (ref 10–45)
ANION GAP SERPL CALC-SCNC: 15 MMOL/L — SIGNIFICANT CHANGE UP (ref 5–17)
AST SERPL-CCNC: 83 U/L — HIGH (ref 10–40)
BASE EXCESS BLDA CALC-SCNC: 2.1 MMOL/L — SIGNIFICANT CHANGE UP (ref -2–3)
BILIRUB SERPL-MCNC: 0.6 MG/DL — SIGNIFICANT CHANGE UP (ref 0.2–1.2)
BUN SERPL-MCNC: 27 MG/DL — HIGH (ref 7–23)
CA-I BLDA-SCNC: 1.12 MMOL/L — SIGNIFICANT CHANGE UP (ref 1.12–1.3)
CALCIUM SERPL-MCNC: 8.2 MG/DL — LOW (ref 8.4–10.5)
CHLORIDE SERPL-SCNC: 104 MMOL/L — SIGNIFICANT CHANGE UP (ref 96–108)
CO2 SERPL-SCNC: 21 MMOL/L — LOW (ref 22–31)
COHGB MFR BLDA: 0.3 % — SIGNIFICANT CHANGE UP
CREAT SERPL-MCNC: 1.43 MG/DL — HIGH (ref 0.5–1.3)
GAS PNL BLDA: SIGNIFICANT CHANGE UP
GLUCOSE BLDC GLUCOMTR-MCNC: 113 MG/DL — HIGH (ref 70–99)
GLUCOSE BLDC GLUCOMTR-MCNC: 119 MG/DL — HIGH (ref 70–99)
GLUCOSE BLDC GLUCOMTR-MCNC: 145 MG/DL — HIGH (ref 70–99)
GLUCOSE BLDC GLUCOMTR-MCNC: 90 MG/DL — SIGNIFICANT CHANGE UP (ref 70–99)
GLUCOSE SERPL-MCNC: 154 MG/DL — HIGH (ref 70–99)
GRAM STN FLD: SIGNIFICANT CHANGE UP
HBA1C BLD-MCNC: 5.1 % — SIGNIFICANT CHANGE UP (ref 4–5.6)
HBV CORE AB SER-ACNC: SIGNIFICANT CHANGE UP
HCO3 BLDA-SCNC: 26 MMOL/L — SIGNIFICANT CHANGE UP (ref 21–28)
HCT VFR BLD CALC: 29.5 % — LOW (ref 39–50)
HCT VFR BLD CALC: 30.8 % — LOW (ref 39–50)
HGB BLD-MCNC: 10.4 G/DL — LOW (ref 13–17)
HGB BLD-MCNC: 9.9 G/DL — LOW (ref 13–17)
HGB BLDA-MCNC: 9.3 G/DL — LOW (ref 13–17)
MAGNESIUM SERPL-MCNC: 2.1 MG/DL — SIGNIFICANT CHANGE UP (ref 1.6–2.6)
MCHC RBC-ENTMCNC: 28.4 PG — SIGNIFICANT CHANGE UP (ref 27–34)
MCHC RBC-ENTMCNC: 28.8 PG — SIGNIFICANT CHANGE UP (ref 27–34)
MCHC RBC-ENTMCNC: 33.6 G/DL — SIGNIFICANT CHANGE UP (ref 32–36)
MCHC RBC-ENTMCNC: 33.8 G/DL — SIGNIFICANT CHANGE UP (ref 32–36)
MCV RBC AUTO: 84.8 FL — SIGNIFICANT CHANGE UP (ref 80–100)
MCV RBC AUTO: 85.3 FL — SIGNIFICANT CHANGE UP (ref 80–100)
METHGB MFR BLDA: 0.3 % — SIGNIFICANT CHANGE UP
O2 CT VFR BLDA CALC: SIGNIFICANT CHANGE UP (ref 15–23)
OXYHGB MFR BLDA: 99 % — SIGNIFICANT CHANGE UP (ref 94–100)
PCO2 BLDA: 38 MMHG — SIGNIFICANT CHANGE UP (ref 35–48)
PH BLDA: 7.46 — HIGH (ref 7.35–7.45)
PHOSPHATE SERPL-MCNC: 4.9 MG/DL — HIGH (ref 2.5–4.5)
PLATELET # BLD AUTO: 121 K/UL — LOW (ref 150–400)
PLATELET # BLD AUTO: 139 K/UL — LOW (ref 150–400)
PO2 BLDA: 397 MMHG — HIGH (ref 83–108)
POTASSIUM BLDA-SCNC: 3.5 MMOL/L — SIGNIFICANT CHANGE UP (ref 3.5–4.9)
POTASSIUM SERPL-MCNC: 4.2 MMOL/L — SIGNIFICANT CHANGE UP (ref 3.5–5.3)
POTASSIUM SERPL-SCNC: 4.2 MMOL/L — SIGNIFICANT CHANGE UP (ref 3.5–5.3)
PROT SERPL-MCNC: 4.6 G/DL — LOW (ref 6–8.3)
RBC # BLD: 3.48 M/UL — LOW (ref 4.2–5.8)
RBC # BLD: 3.61 M/UL — LOW (ref 4.2–5.8)
RBC # FLD: 14.4 % — SIGNIFICANT CHANGE UP (ref 10.3–16.9)
RBC # FLD: 14.5 % — SIGNIFICANT CHANGE UP (ref 10.3–16.9)
SAO2 % BLDA: 100 % — SIGNIFICANT CHANGE UP (ref 95–100)
SODIUM BLDA-SCNC: 138 MMOL/L — SIGNIFICANT CHANGE UP (ref 138–146)
SODIUM SERPL-SCNC: 140 MMOL/L — SIGNIFICANT CHANGE UP (ref 135–145)
SPECIMEN SOURCE: SIGNIFICANT CHANGE UP
WBC # BLD: 8.5 K/UL — SIGNIFICANT CHANGE UP (ref 3.8–10.5)
WBC # BLD: 8.6 K/UL — SIGNIFICANT CHANGE UP (ref 3.8–10.5)
WBC # FLD AUTO: 8.5 K/UL — SIGNIFICANT CHANGE UP (ref 3.8–10.5)
WBC # FLD AUTO: 8.6 K/UL — SIGNIFICANT CHANGE UP (ref 3.8–10.5)

## 2017-11-28 PROCEDURE — 99233 SBSQ HOSP IP/OBS HIGH 50: CPT | Mod: GC

## 2017-11-28 PROCEDURE — 71010: CPT | Mod: 26

## 2017-11-28 RX ORDER — SODIUM CHLORIDE 9 MG/ML
500 INJECTION INTRAMUSCULAR; INTRAVENOUS; SUBCUTANEOUS ONCE
Qty: 0 | Refills: 0 | Status: DISCONTINUED | OUTPATIENT
Start: 2017-11-28 | End: 2017-11-28

## 2017-11-28 RX ORDER — SODIUM CHLORIDE 9 MG/ML
1000 INJECTION INTRAMUSCULAR; INTRAVENOUS; SUBCUTANEOUS ONCE
Qty: 0 | Refills: 0 | Status: DISCONTINUED | OUTPATIENT
Start: 2017-11-28 | End: 2017-11-28

## 2017-11-28 RX ORDER — FENTANYL CITRATE 50 UG/ML
25 INJECTION INTRAVENOUS ONCE
Qty: 0 | Refills: 0 | Status: DISCONTINUED | OUTPATIENT
Start: 2017-11-28 | End: 2017-11-28

## 2017-11-28 RX ORDER — ACETAMINOPHEN 500 MG
1000 TABLET ORAL ONCE
Qty: 0 | Refills: 0 | Status: COMPLETED | OUTPATIENT
Start: 2017-11-28 | End: 2017-11-28

## 2017-11-28 RX ORDER — SODIUM CHLORIDE 9 MG/ML
500 INJECTION INTRAMUSCULAR; INTRAVENOUS; SUBCUTANEOUS ONCE
Qty: 0 | Refills: 0 | Status: COMPLETED | OUTPATIENT
Start: 2017-11-28 | End: 2017-11-28

## 2017-11-28 RX ORDER — ALBUMIN HUMAN 25 %
250 VIAL (ML) INTRAVENOUS ONCE
Qty: 0 | Refills: 0 | Status: COMPLETED | OUTPATIENT
Start: 2017-11-28 | End: 2017-11-28

## 2017-11-28 RX ADMIN — Medication 50 GRAM(S): at 00:21

## 2017-11-28 RX ADMIN — CELECOXIB 400 MILLIGRAM(S): 200 CAPSULE ORAL at 07:18

## 2017-11-28 RX ADMIN — Medication 1 TABLET(S): at 11:22

## 2017-11-28 RX ADMIN — Medication 1 TABLET(S): at 11:21

## 2017-11-28 RX ADMIN — PIPERACILLIN AND TAZOBACTAM 200 GRAM(S): 4; .5 INJECTION, POWDER, LYOPHILIZED, FOR SOLUTION INTRAVENOUS at 07:51

## 2017-11-28 RX ADMIN — Medication 300 MILLIGRAM(S): at 06:47

## 2017-11-28 RX ADMIN — PIPERACILLIN AND TAZOBACTAM 200 GRAM(S): 4; .5 INJECTION, POWDER, LYOPHILIZED, FOR SOLUTION INTRAVENOUS at 02:07

## 2017-11-28 RX ADMIN — LORATADINE 10 MILLIGRAM(S): 10 TABLET ORAL at 11:22

## 2017-11-28 RX ADMIN — SODIUM CHLORIDE 3000 MILLILITER(S): 9 INJECTION INTRAMUSCULAR; INTRAVENOUS; SUBCUTANEOUS at 10:30

## 2017-11-28 RX ADMIN — Medication 100 MILLIEQUIVALENT(S): at 01:00

## 2017-11-28 RX ADMIN — Medication 100 MILLIEQUIVALENT(S): at 00:00

## 2017-11-28 RX ADMIN — PIPERACILLIN AND TAZOBACTAM 200 GRAM(S): 4; .5 INJECTION, POWDER, LYOPHILIZED, FOR SOLUTION INTRAVENOUS at 11:21

## 2017-11-28 RX ADMIN — OXYCODONE HYDROCHLORIDE 10 MILLIGRAM(S): 5 TABLET ORAL at 07:18

## 2017-11-28 RX ADMIN — PIPERACILLIN AND TAZOBACTAM 200 GRAM(S): 4; .5 INJECTION, POWDER, LYOPHILIZED, FOR SOLUTION INTRAVENOUS at 18:24

## 2017-11-28 RX ADMIN — SODIUM CHLORIDE 3000 MILLILITER(S): 9 INJECTION INTRAMUSCULAR; INTRAVENOUS; SUBCUTANEOUS at 10:00

## 2017-11-28 RX ADMIN — CHLORHEXIDINE GLUCONATE 15 MILLILITER(S): 213 SOLUTION TOPICAL at 06:47

## 2017-11-28 RX ADMIN — Medication 1000 MILLIGRAM(S): at 09:43

## 2017-11-28 RX ADMIN — Medication 325 MILLIGRAM(S): at 18:05

## 2017-11-28 RX ADMIN — Medication 100 MILLIEQUIVALENT(S): at 02:09

## 2017-11-28 RX ADMIN — SODIUM CHLORIDE 100 MILLILITER(S): 9 INJECTION INTRAMUSCULAR; INTRAVENOUS; SUBCUTANEOUS at 00:22

## 2017-11-28 RX ADMIN — PANTOPRAZOLE SODIUM 40 MILLIGRAM(S): 20 TABLET, DELAYED RELEASE ORAL at 11:21

## 2017-11-28 RX ADMIN — Medication 250 MILLILITER(S): at 14:52

## 2017-11-28 RX ADMIN — Medication 400 MILLIGRAM(S): at 09:32

## 2017-11-28 NOTE — PHYSICAL THERAPY INITIAL EVALUATION ADULT - PRECAUTIONS/LIMITATIONS, REHAB EVAL
posterior hip precautions; O2 NC 2L/fall precautions/left hip precautions/oxygen therapy device and L/min

## 2017-11-28 NOTE — PROGRESS NOTE ADULT - SUBJECTIVE AND OBJECTIVE BOX
INTERVAL HPI/OVERNIGHT EVENTS: ON: post-op transferred to SICU intubated, had some low BPs, gave 1L bolus. Borderline UOP - clear but positional.     Pt seen and examined. Remains intubated on CPAP. Denies pain, SOB, chest pain, nausea      MEDICATIONS  (STANDING):  aspirin enteric coated 325 milliGRAM(s) Oral two times a day  bicalutamide 50 milliGRAM(s) Oral daily  BUpivacaine liposome 1.3% Injectable (no eMAR) 20 milliLiter(s) Local Injection once  chlorhexidine 0.12% Liquid 15 milliLiter(s) Swish and Spit two times a day  dextrose 5%. 1000 milliLiter(s) (50 mL/Hr) IV Continuous <Continuous>  dextrose 50% Injectable 12.5 Gram(s) IV Push once  dextrose 50% Injectable 25 Gram(s) IV Push once  dextrose 50% Injectable 25 Gram(s) IV Push once  doxazosin 4 milliGRAM(s) Oral at bedtime  fentaNYL   Infusion 0.28 MICROgram(s)/kG/Hr (2.5 mL/Hr) IV Continuous <Continuous>  furosemide    Tablet 40 milliGRAM(s) Oral daily  insulin lispro (HumaLOG) corrective regimen sliding scale   SubCutaneous every 6 hours  lactobacillus acidophilus 1 Tablet(s) Oral daily  loratadine 10 milliGRAM(s) Oral daily  multivitamin 1 Tablet(s) Oral daily  pantoprazole    Tablet 40 milliGRAM(s) Oral before breakfast  pantoprazole  Injectable 40 milliGRAM(s) IV Push daily  piperacillin/tazobactam IVPB. 3.375 Gram(s) IV Intermittent every 6 hours  propofol Infusion 5.593 MICROgram(s)/kG/Min (3 mL/Hr) IV Continuous <Continuous>  sodium chloride 0.9% Bolus 1000 milliLiter(s) IV Bolus once  sodium chloride 0.9%. 1000 milliLiter(s) (100 mL/Hr) IV Continuous <Continuous>  vancomycin  IVPB 1500 milliGRAM(s) IV Intermittent every 24 hours    MEDICATIONS  (PRN):  dextrose Gel 1 Dose(s) Oral once PRN Blood Glucose LESS THAN 70 milliGRAM(s)/deciliter  glucagon  Injectable 1 milliGRAM(s) IntraMuscular once PRN Glucose LESS THAN 70 milligrams/deciliter  ondansetron Injectable 4 milliGRAM(s) IV Push every 6 hours PRN Nausea and/or Vomiting      Drug Dosing Weight  Height (cm): 157.48 (27 Nov 2017 12:10)  Weight (kg): 89.4 (27 Nov 2017 12:10)  BMI (kg/m2): 36 (27 Nov 2017 12:10)  BSA (m2): 1.9 (27 Nov 2017 12:10)      PAST MEDICAL & SURGICAL HISTORY:  Hearing loss  Osteoarthritis  Fluid retention in legs  Seasonal allergies  Prostate cancer: 48 treatments of RT  Eye problem: corrective lens in the right  History of hip replacement, total, 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  S/P inguinal hernia repair: 1961 cannot remember which side  S/P cholecystectomy: 1985  History of appendectomy      ICU Vital Signs Last 24 Hrs  T(C): 36 (28 Nov 2017 06:00), Max: 36 (28 Nov 2017 06:00)  T(F): 96.8 (28 Nov 2017 06:00), Max: 96.8 (28 Nov 2017 06:00)  HR: 72 (28 Nov 2017 07:00) (60 - 76)  BP: 99/47 (28 Nov 2017 07:00) (65/42 - 163/65)  BP(mean): 68 (28 Nov 2017 07:00) (52 - 95)  ABP: 124/46 (28 Nov 2017 07:00) (88/38 - 148/54)  ABP(mean): 72 (28 Nov 2017 07:00) (56 - 86)  RR: 15 (28 Nov 2017 07:00) (12 - 26)  SpO2: 97% (28 Nov 2017 07:00) (94% - 100%)      ABG - ( 27 Nov 2017 22:51 )  pH: 7.46  /  pCO2: 36    /  pO2: 98    / HCO3: 25    / Base Excess: 1.3   /  SaO2: 98              27 Nov 2017 07:01  -  28 Nov 2017 07:00  --------------------------------------------------------  IN:    fentaNYL  Infusion: 12 mL    IV PiggyBack: 850 mL    propofol Infusion: 45 mL    Sodium Chloride 0.9% IV Bolus: 1000 mL    sodium chloride 0.9%.: 580 mL  Total IN: 2487 mL    OUT:    Indwelling Catheter - Urethral: 685 mL  Total OUT: 685 mL    Total NET: 1802 mL      28 Nov 2017 07:01  -  28 Nov 2017 07:39  --------------------------------------------------------  IN:    sodium chloride 0.9%.: 100 mL  Total IN: 100 mL    OUT:  Total OUT: 0 mL    Total NET: 100 mL          Mode: CPAP with PS  FiO2: 50  PEEP: 5  PS: 10  MAP: 8  PIP: 16      PHYSICAL EXAM:  Gen: NAD resting comfortably in bed.  Neuro: Off sedation. alert and oriented x 3. CN II-XII grossly intact  CV: RRR, no murmur  Pulm: CTA x 2. Intubated on CPAP  Abd: Soft, ND/NT throughout, no rebound or guarding  : rodrigues draining clear urine  Ext: L hip with provena vac in place, no surrounding erythema or edema, CDI        LABS:  CBC Full  -  ( 28 Nov 2017 04:44 )  WBC Count : 8.5 K/uL  Hemoglobin : 10.4 g/dL  Hematocrit : 30.8 %  Platelet Count - Automated : 121 K/uL  Mean Cell Volume : 85.3 fL  Mean Cell Hemoglobin : 28.8 pg  Mean Cell Hemoglobin Concentration : 33.8 g/dL      11-28    140  |  104  |  27<H>  ----------------------------<  154<H>  4.2   |  21<L>  |  1.43<H>    Ca    8.2<L>      28 Nov 2017 04:44  Phos  4.9     11-28  Mg     2.1     11-28    TPro  4.6<L>  /  Alb  2.5<L>  /  TBili  0.6  /  DBili  x   /  AST  83<H>  /  ALT  69<H>  /  AlkPhos  142<H>  11-28    PT/INR - ( 27 Nov 2017 23:07 )   PT: 13.9 sec;   INR: 1.25          PTT - ( 27 Nov 2017 23:07 )  PTT:27.0 sec

## 2017-11-28 NOTE — DISCHARGE NOTE ADULT - MEDICATION SUMMARY - MEDICATIONS TO TAKE
I will START or STAY ON the medications listed below when I get home from the hospital:    traMADol 50 mg oral tablet  -- 0.5 tab(s) by mouth 3 times a day, As needed, Moderate Pain (4 - 6)  -- Indication: For moderate pain    aspirin 325 mg oral delayed release tablet  -- 1 tab(s) by mouth 2 times a day  Take for 4 weeks after surgery to prevent blood clots  -- Indication: For Dvt ppx    acetaminophen 325 mg oral tablet  -- 2 tab(s) by mouth every 6 hours, As needed, Mild Pain (1 - 3)  -- Indication: For mild pain    doxazosin 4 mg oral tablet  -- 1 tab(s) by mouth once a day  -- Indication: For Benign prostatic hyperplasia without lower urinary tract symptoms    loratadine 10 mg oral tablet  -- 1 tab(s) by mouth once a day  -- Indication: For antihistamine    Lupron Depot 22.5 mg/3 months intramuscular injection, extended release  --  intramuscular every 3 months.  Do not restart until approved by Dr. Christensen  -- Indication: For prostate Ca    bicalutamide 50 mg oral tablet  -- 1 tab(s) by mouth every 24 hours  -- Indication: For prostate Ca    ipratropium-albuterol 0.5 mg-2.5 mg/3 mLinhalation solution  -- 3 milliliter(s) inhaled every 6 hours, standing order  -- Indication: For wheezing/ atelectasis     tiotropium 18 mcg inhalation capsule  -- 1 cap(s) inhaled once a day  -- Indication: For wheezing/ atelectasis     furosemide 40 mg oral tablet  -- 1 tab(s) by mouth once a day  -- Indication: For Home diruetic (hold if CKD worsens)    docusate sodium 100 mg oral capsule  -- 1 cap(s) by mouth 3 times a day  -- Indication: For Constipation    polyethylene glycol 3350 oral powder for reconstitution  -- 17 gram(s) by mouth once a day, As needed, Constipation  -- Indication: For Constipation    lactobacillus acidophilus oral capsule  -- 1 tablet by mouth daily   -- Indication: For probiotic    pantoprazole 40 mg oral delayed release tablet  -- 1 tab(s) by mouth once a day (before a meal)  -- Indication: For PPi    Multiple Vitamins oral tablet  -- 1 tab(s) by mouth once a day  -- Indication: For supplement

## 2017-11-28 NOTE — DISCHARGE NOTE ADULT - PATIENT PORTAL LINK FT
“You can access the [employer] Patient Portal, offered by [facility], by registering with the following website: [email or web address]”

## 2017-11-28 NOTE — DISCHARGE NOTE ADULT - NS AS ACTIVITY OBS
Do not make important decisions/Do not drive or operate machinery/No Heavy lifting/straining No Heavy lifting/straining/Do not make important decisions/Do not drive or operate machinery/Showering allowed

## 2017-11-28 NOTE — PHYSICAL THERAPY INITIAL EVALUATION ADULT - PERTINENT HX OF CURRENT PROBLEM, REHAB EVAL
84M s/p left hip spacer placement for infected left JOANNA. Patient had initial left JOANNA on 8/17/17 and revision/spacer placement on 9/4/17 at [facility]. Patient states pain to left hip is 4/10, and c/o numbness to b/l big toes due his sheets feeling too tight

## 2017-11-28 NOTE — DISCHARGE NOTE ADULT - HOSPITAL COURSE
Admit 11/27/17  OR s/p revision of L THR 11/27/17  Periop Antibx  DVT ppx  PT/OT  Pain mgt  medicine c/s  I/D c/s  sicu c/s Admit 11/27/17  OR s/p revision of L THR 11/27/17  Periop Antibx  DVT ppx  PT/OT  Pain mgt  I/D c/s  sicu c/s  med c/s- wheezing, CXR bilateral lung atelectasis

## 2017-11-28 NOTE — DISCHARGE NOTE ADULT - PLAN OF CARE
improved ambulation and decrease pain after left total hip revision posterior approach on 11/27/17 Weight bearing as tolerated with use of rolling walker.   Follow posterior hip precautions: Knees should always be lower than operative hip. No bending from waist to the floor either from sitting or standing position, must use long handled reacher to pick things up and must use long handled equipment for dressing. No crossing your legs, so keep pillow between your legs. No turning your operated hip inwards. No strenuous activity, heavy lifting, driving, tub bathing, or returning to work until cleared by MD.  Pt has Prevena plus incisional drain to promote wound closure. Charge Prevena plus while patient is laying in bed. Keep on until battery miguel down, then remove the Prevena and apply an Aquacell dressing and dispose the Prevena in the garbage.   Remove dressing after post op day 5, then leave incision open to air.  Follow up with Dr. Titus to schedule an appt within 10-14 days.  If you don't have a bowel movement by post op day 3, then take Milk of Magnesia (over the counter).  If no bowel movement by at least post op day 5, then use a Dulcolax suppository (over the counter) and/or a Fleets enema--if still no bowel movement, call your MD.  Contact your doctor if you experience: fever greater than 101.5, chills, chest pain, difficulty breathing, bleeding, redness or heat around the incision. Weight bearing as tolerated with use of rolling walker.   Follow posterior hip precautions: Knees should always be lower than operative hip. No bending from waist to the floor either from sitting or standing position, must use long handled reacher to pick things up and must use long handled equipment for dressing. No crossing your legs, so keep pillow between your legs. No turning your operated hip inwards. No strenuous activity, heavy lifting, driving, tub bathing, or returning to work until cleared by MD.  Pt has an aquacel dressing.  Keep on while in rehab.  The dressing is waterproof. Keep on until follow up with Dr. Christensen.   Follow up with Dr. Titus to schedule an appt within 10-14 days of surgery.  If you don't have a bowel movement by post op day 3, then take Milk of Magnesia (over the counter).  If no bowel movement by at least post op day 5, then use a Dulcolax suppository (over the counter) and/or a Fleets enema--if still no bowel movement, call your MD.  Contact your doctor if you experience: fever greater than 101.5, chills, chest pain, difficulty breathing, bleeding, redness or heat around the incision.

## 2017-11-28 NOTE — PHYSICAL THERAPY INITIAL EVALUATION ADULT - LEVEL OF INDEPENDENCE: SUPINE/SIT, REHAB EVAL
able to dangle with close supervision x 4 minutes; complaints of dizziness throughout, orthostatic hypotension/maximum assist (25% patients effort)

## 2017-11-28 NOTE — PROGRESS NOTE ADULT - SUBJECTIVE AND OBJECTIVE BOX
ORTHO NOTE    [x ] Pt seen/examined.  [x ] Pt without any complaints/in NAD.    [ ] Pt complains of:      ROS: [ ] Fever  [ ] Chills  [ ] CP [ ] SOB [ ] Dysnea  [ ] Palpitations [ ] Cough [ ] N/V/C/D [ ] Paresthia [ ] Other     [ x] ROS  otherwise negative    .    PHYSICAL EXAM:    Vital Signs Last 24 Hrs  T(C): 36.5 (28 Nov 2017 09:00), Max: 36.5 (28 Nov 2017 09:00)  T(F): 97.7 (28 Nov 2017 09:00), Max: 97.7 (28 Nov 2017 09:00)  HR: 76 (28 Nov 2017 13:00) (60 - 82)  BP: 70/51 (28 Nov 2017 13:00) (65/42 - 163/65)  BP(mean): 62 (28 Nov 2017 13:00) (52 - 95)  RR: 15 (28 Nov 2017 13:00) (12 - 26)  SpO2: 94% (28 Nov 2017 13:00) (94% - 100%)    I&O's Detail    27 Nov 2017 07:01  -  28 Nov 2017 07:00  --------------------------------------------------------  IN:    fentaNYL  Infusion: 12 mL    IV PiggyBack: 850 mL    propofol Infusion: 45 mL    Sodium Chloride 0.9% IV Bolus: 1000 mL    sodium chloride 0.9%.: 580 mL  Total IN: 2487 mL    OUT:    Indwelling Catheter - Urethral: 685 mL  Total OUT: 685 mL    Total NET: 1802 mL      28 Nov 2017 07:01  -  28 Nov 2017 13:30  --------------------------------------------------------  IN:    IV PiggyBack: 200 mL    Sodium Chloride 0.9% IV Bolus: 500 mL    sodium chloride 0.9%.: 595 mL  Total IN: 1295 mL    OUT:    Indwelling Catheter - Urethral: 120 mL  Total OUT: 120 mL    Total NET: 1175 mL           CAPILLARY BLOOD GLUCOSE      POCT Blood Glucose.: 119 mg/dL (28 Nov 2017 11:30)  POCT Blood Glucose.: 90 mg/dL (28 Nov 2017 04:30)  POCT Blood Glucose.: 113 mg/dL (28 Nov 2017 01:00)  POCT Blood Glucose.: 140 mg/dL (27 Nov 2017 20:40)  POCT Blood Glucose.: 95 mg/dL (27 Nov 2017 17:13)                  Neuro: AAOx3, [place] (not wearing his hearing aides)    Lungs: CTA, 100%venti mask 02 sat %    CV: telemetry per SICU    ABD: soft non tender     Ext: L posterior hip with prevena plus to suction, LLE NVID, motor 5/5, hip abductor pillow implemented     LABS                        9.9    8.6   )-----------( 139      ( 28 Nov 2017 12:48 )             29.5                              PT/INR - ( 27 Nov 2017 23:07 )   PT: 13.9 sec;   INR: 1.25          PTT - ( 27 Nov 2017 23:07 )  PTT:27.0 sec  11-28    140  |  104  |  27<H>  ----------------------------<  154<H>  4.2   |  21<L>  |  1.43<H>    Ca    8.2<L>      28 Nov 2017 04:44  Phos  4.9     11-28  Mg     2.1     11-28    TPro  4.6<L>  /  Alb  2.5<L>  /  TBili  0.6  /  DBili  x   /  AST  83<H>  /  ALT  69<H>  /  AlkPhos  142<H>  11-28      [ ] Other Labs  [ ] None ordered            Please check or [place] when present:  •  Heart Failure:    [ ] Acute        [ ]  Acute on Chronic        [ ] Chronic         [ ] Diastolic     [ ]  Combined    •  FABY:     [ ] ATN        [ ]  Renal medullary necrosis       [ ]  Renal cortical necrosis                  [ ] Other pathological Lesion:  •  CKD:  [ ] Stage I   [ ] Stage II  [ ] Stage III    [ ]Stage IV   [ ]  CKD V   [x ]  Other/Unspecified:    •  Abdominal Nutritional Status:   [ ] Malnutrition-See Nutrition note    [ ] Cachexia   [ ]  Other        [ ] Supplement ordered:            [ ] Morbid Obesity: BMI >=40         ASSESSMENT/PLAN:      STATUS POST:  revision of L THR POD#1 posterior approach  pt extubated today, 100% venti mask 02sat %  Creat 1.43 no use of NSAIDs   IV Tylenol for pain control     CONTINUE:          [ ] PT/OT- WBAT with assist     [ ] DVT PPX- SCDs    [ ] Pain Mgt- IV Tylenol     [ ] Dispo plan- pending eval ORTHO NOTE    [x ] Pt seen/examined.  [x ] Pt without any complaints/in NAD.    [ ] Pt complains of:      ROS: [ ] Fever  [ ] Chills  [ ] CP [ ] SOB [ ] Dysnea  [ ] Palpitations [ ] Cough [ ] N/V/C/D [ ] Paresthia [ ] Other     [ x] ROS  otherwise negative    .    PHYSICAL EXAM:    Vital Signs Last 24 Hrs  T(C): 36.5 (28 Nov 2017 09:00), Max: 36.5 (28 Nov 2017 09:00)  T(F): 97.7 (28 Nov 2017 09:00), Max: 97.7 (28 Nov 2017 09:00)  HR: 76 (28 Nov 2017 13:00) (60 - 82)  BP: 70/51 (28 Nov 2017 13:00) (65/42 - 163/65)  BP(mean): 62 (28 Nov 2017 13:00) (52 - 95)  RR: 15 (28 Nov 2017 13:00) (12 - 26)  SpO2: 94% (28 Nov 2017 13:00) (94% - 100%)    I&O's Detail    27 Nov 2017 07:01  -  28 Nov 2017 07:00  --------------------------------------------------------  IN:    fentaNYL  Infusion: 12 mL    IV PiggyBack: 850 mL    propofol Infusion: 45 mL    Sodium Chloride 0.9% IV Bolus: 1000 mL    sodium chloride 0.9%.: 580 mL  Total IN: 2487 mL    OUT:    Indwelling Catheter - Urethral: 685 mL  Total OUT: 685 mL    Total NET: 1802 mL      28 Nov 2017 07:01  -  28 Nov 2017 13:30  --------------------------------------------------------  IN:    IV PiggyBack: 200 mL    Sodium Chloride 0.9% IV Bolus: 500 mL    sodium chloride 0.9%.: 595 mL  Total IN: 1295 mL    OUT:    Indwelling Catheter - Urethral: 120 mL  Total OUT: 120 mL    Total NET: 1175 mL           CAPILLARY BLOOD GLUCOSE      POCT Blood Glucose.: 119 mg/dL (28 Nov 2017 11:30)  POCT Blood Glucose.: 90 mg/dL (28 Nov 2017 04:30)  POCT Blood Glucose.: 113 mg/dL (28 Nov 2017 01:00)  POCT Blood Glucose.: 140 mg/dL (27 Nov 2017 20:40)  POCT Blood Glucose.: 95 mg/dL (27 Nov 2017 17:13)                  Neuro: AAOx3, [place] (not wearing his hearing aides)    Lungs: CTA, 100%venti mask 02 sat %    CV: telemetry per SICU    ABD: soft non tender     Ext: L posterior hip with prevena plus to suction, LLE NVID, motor 5/5, hip abductor pillow implemented     LABS                        9.9    8.6   )-----------( 139      ( 28 Nov 2017 12:48 )             29.5                              PT/INR - ( 27 Nov 2017 23:07 )   PT: 13.9 sec;   INR: 1.25          PTT - ( 27 Nov 2017 23:07 )  PTT:27.0 sec  11-28    140  |  104  |  27<H>  ----------------------------<  154<H>  4.2   |  21<L>  |  1.43<H>    Ca    8.2<L>      28 Nov 2017 04:44  Phos  4.9     11-28  Mg     2.1     11-28    TPro  4.6<L>  /  Alb  2.5<L>  /  TBili  0.6  /  DBili  x   /  AST  83<H>  /  ALT  69<H>  /  AlkPhos  142<H>  11-28      [ ] Other Labs  [ ] None ordered            Please check or [place] when present:  •  Heart Failure:    [ ] Acute        [ ]  Acute on Chronic        [ ] Chronic         [ ] Diastolic     [ ]  Combined    •  FABY:     [ ] ATN        [ ]  Renal medullary necrosis       [ ]  Renal cortical necrosis                  [ ] Other pathological Lesion:  •  CKD:  [ ] Stage I   [ ] Stage II  [ ] Stage III    [ ]Stage IV   [ ]  CKD V   [x ]  Other/Unspecified:    •  Abdominal Nutritional Status:   [ ] Malnutrition-See Nutrition note    [ ] Cachexia   [ ]  Other        [ ] Supplement ordered:            [ ] Morbid Obesity: BMI >=40         ASSESSMENT/PLAN:      STATUS POST:  revision of L THR POD#1 posterior approach  pt extubated today, 100% venti mask 02sat %  Creat 1.43 no use of NSAIDs   IV Tylenol for pain control   IV Vanco, Zosyn until final cultures result     CONTINUE:          [ ] PT/OT- WBAT with assist     [ ] DVT PPX- SCDs    [ ] Pain Mgt- IV Tylenol     [ ] Dispo plan- pending eval

## 2017-11-28 NOTE — PHYSICAL THERAPY INITIAL EVALUATION ADULT - ADDITIONAL COMMENTS
prior to rehab in August, patient living in [facility], independent prior to arrival, using straight cane and rolling walker

## 2017-11-28 NOTE — DISCHARGE NOTE ADULT - CARE PLAN
Principal Discharge DX:	Joint infection  Goal:	improved ambulation and decrease pain after left total hip revision posterior approach on 11/27/17  Instructions for follow-up, activity and diet:	Weight bearing as tolerated with use of rolling walker.   Follow posterior hip precautions: Knees should always be lower than operative hip. No bending from waist to the floor either from sitting or standing position, must use long handled reacher to pick things up and must use long handled equipment for dressing. No crossing your legs, so keep pillow between your legs. No turning your operated hip inwards. No strenuous activity, heavy lifting, driving, tub bathing, or returning to work until cleared by MD.  Pt has Prevena plus incisional drain to promote wound closure. Charge Prevena plus while patient is laying in bed. Keep on until battery miguel down, then remove the Prevena and apply an Aquacell dressing and dispose the Prevena in the garbage.   Remove dressing after post op day 5, then leave incision open to air.  Follow up with Dr. Titus to schedule an appt within 10-14 days.  If you don't have a bowel movement by post op day 3, then take Milk of Magnesia (over the counter).  If no bowel movement by at least post op day 5, then use a Dulcolax suppository (over the counter) and/or a Fleets enema--if still no bowel movement, call your MD.  Contact your doctor if you experience: fever greater than 101.5, chills, chest pain, difficulty breathing, bleeding, redness or heat around the incision. Principal Discharge DX:	Joint infection  Goal:	improved ambulation and decrease pain after left total hip revision posterior approach on 11/27/17  Instructions for follow-up, activity and diet:	Weight bearing as tolerated with use of rolling walker.   Follow posterior hip precautions: Knees should always be lower than operative hip. No bending from waist to the floor either from sitting or standing position, must use long handled reacher to pick things up and must use long handled equipment for dressing. No crossing your legs, so keep pillow between your legs. No turning your operated hip inwards. No strenuous activity, heavy lifting, driving, tub bathing, or returning to work until cleared by MD.  Pt has an aquacel dressing.  Keep on while in rehab.  The dressing is waterproof. Keep on until follow up with Dr. Christensen.   Follow up with Dr. Titus to schedule an appt within 10-14 days of surgery.  If you don't have a bowel movement by post op day 3, then take Milk of Magnesia (over the counter).  If no bowel movement by at least post op day 5, then use a Dulcolax suppository (over the counter) and/or a Fleets enema--if still no bowel movement, call your MD.  Contact your doctor if you experience: fever greater than 101.5, chills, chest pain, difficulty breathing, bleeding, redness or heat around the incision.

## 2017-11-28 NOTE — DISCHARGE NOTE ADULT - MEDICATION SUMMARY - MEDICATIONS TO CHANGE
I will SWITCH the dose or number of times a day I take the medications listed below when I get home from the hospital:    Lupron Depot 22.5 mg/3 months intramuscular injection, extended release  --  intramuscular    traMADol 50 mg oral tablet  -- 0.5 tab(s) by mouth 3 times a day, As needed, Moderate Pain (4 - 6)

## 2017-11-28 NOTE — DISCHARGE NOTE ADULT - CARE PROVIDER_API CALL
Jacinto Titus), Orthopaedic Surgery  [address] [place] [address]  Phone: [phone]  Fax: [phone]

## 2017-11-28 NOTE — PHYSICAL THERAPY INITIAL EVALUATION ADULT - CRITERIA FOR SKILLED THERAPEUTIC INTERVENTIONS
functional limitations in following categories/anticipated discharge recommendation/risk reduction/prevention/rehab potential/impairments found/therapy frequency

## 2017-11-28 NOTE — PROGRESS NOTE ADULT - SUBJECTIVE AND OBJECTIVE BOX
S: No acute events overnight. Remained intubated.     Vital Signs Last 24 Hrs  T(C): 35.3 (28 Nov 2017 05:40), Max: 35.7 (27 Nov 2017 23:30)  T(F): 95.5 (28 Nov 2017 05:40), Max: 96.3 (27 Nov 2017 23:30)  HR: 76 (28 Nov 2017 05:45) (60 - 76)  BP: 86/46 (28 Nov 2017 05:00) (65/42 - 163/65)  BP(mean): 61 (28 Nov 2017 05:00) (52 - 95)  RR: 16 (28 Nov 2017 05:45) (12 - 26)  SpO2: 97% (28 Nov 2017 05:45) (94% - 100%)  I&O's Summary    27 Nov 2017 07:01  -  28 Nov 2017 06:02  --------------------------------------------------------  IN: 1887 mL / OUT: 595 mL / NET: 1292 mL        Prevena tosuction (L hip) CDI  Motor and Sensory exam not adequately obtainable bc patient is sedated/intubated  Pulses: WWP, DP/PT 2+                            10.4   8.5   )-----------( 121      ( 28 Nov 2017 04:44 )             30.8     11-28    140  |  104  |  27<H>  ----------------------------<  154<H>  4.2   |  21<L>  |  1.43<H>    Ca    8.2<L>      28 Nov 2017 04:44  Phos  4.9     11-28  Mg     2.1     11-28    TPro  4.6<L>  /  Alb  2.5<L>  /  TBili  0.6  /  DBili  x   /  AST  83<H>  /  ALT  69<H>  /  AlkPhos  142<H>  11-28      MEDICATIONS  (STANDING):  aspirin enteric coated 325 milliGRAM(s) Oral two times a day  bicalutamide 50 milliGRAM(s) Oral daily  BUpivacaine liposome 1.3% Injectable (no eMAR) 20 milliLiter(s) Local Injection once  chlorhexidine 0.12% Liquid 15 milliLiter(s) Swish and Spit two times a day  dextrose 5%. 1000 milliLiter(s) (50 mL/Hr) IV Continuous <Continuous>  dextrose 50% Injectable 12.5 Gram(s) IV Push once  dextrose 50% Injectable 25 Gram(s) IV Push once  dextrose 50% Injectable 25 Gram(s) IV Push once  doxazosin 4 milliGRAM(s) Oral at bedtime  fentaNYL   Infusion 0.28 MICROgram(s)/kG/Hr (2.5 mL/Hr) IV Continuous <Continuous>  furosemide    Tablet 40 milliGRAM(s) Oral daily  insulin lispro (HumaLOG) corrective regimen sliding scale   SubCutaneous every 6 hours  lactobacillus acidophilus 1 Tablet(s) Oral daily  loratadine 10 milliGRAM(s) Oral daily  multivitamin 1 Tablet(s) Oral daily  pantoprazole    Tablet 40 milliGRAM(s) Oral before breakfast  pantoprazole  Injectable 40 milliGRAM(s) IV Push daily  piperacillin/tazobactam IVPB. 3.375 Gram(s) IV Intermittent every 6 hours  propofol Infusion 5.593 MICROgram(s)/kG/Min (3 mL/Hr) IV Continuous <Continuous>  sodium chloride 0.9% Bolus 1000 milliLiter(s) IV Bolus once  sodium chloride 0.9%. 1000 milliLiter(s) (100 mL/Hr) IV Continuous <Continuous>  vancomycin  IVPB 1500 milliGRAM(s) IV Intermittent every 24 hours    MEDICATIONS  (PRN):  dextrose Gel 1 Dose(s) Oral once PRN Blood Glucose LESS THAN 70 milliGRAM(s)/deciliter  glucagon  Injectable 1 milliGRAM(s) IntraMuscular once PRN Glucose LESS THAN 70 milligrams/deciliter  ondansetron Injectable 4 milliGRAM(s) IV Push every 6 hours PRN Nausea and/or Vomiting      A/P:  84y Male s/p revision L JOANNA POD 1  -Stable  -Follow up intra-op cxs  -Cont Vanc and Zosyn for broad-spect coverage untilfinal cx results return  -Pain Control  -PT/WBAT  -DVT ppx:  BID  -Care per SICU team  -f/u AM labs  -Dispo: Pending S: No acute events overnight. Remained intubated.     Vital Signs Last 24 Hrs  T(C): 35.3 (28 Nov 2017 05:40), Max: 35.7 (27 Nov 2017 23:30)  T(F): 95.5 (28 Nov 2017 05:40), Max: 96.3 (27 Nov 2017 23:30)  HR: 76 (28 Nov 2017 05:45) (60 - 76)  BP: 86/46 (28 Nov 2017 05:00) (65/42 - 163/65)  BP(mean): 61 (28 Nov 2017 05:00) (52 - 95)  RR: 16 (28 Nov 2017 05:45) (12 - 26)  SpO2: 97% (28 Nov 2017 05:45) (94% - 100%)  I&O's Summary    27 Nov 2017 07:01  -  28 Nov 2017 06:02  --------------------------------------------------------  IN: 1887 mL / OUT: 595 mL / NET: 1292 mL      Intubated but responding to commands  Prevena to suction (L Hip) CDI  Motor: Firing TA, EHL, FHL  Sensory: SILT distally  PT and DP pulses 1+                        10.4   8.5   )-----------( 121      ( 28 Nov 2017 04:44 )             30.8     11-28    140  |  104  |  27<H>  ----------------------------<  154<H>  4.2   |  21<L>  |  1.43<H>    Ca    8.2<L>      28 Nov 2017 04:44  Phos  4.9     11-28  Mg     2.1     11-28    TPro  4.6<L>  /  Alb  2.5<L>  /  TBili  0.6  /  DBili  x   /  AST  83<H>  /  ALT  69<H>  /  AlkPhos  142<H>  11-28      MEDICATIONS  (STANDING):  aspirin enteric coated 325 milliGRAM(s) Oral two times a day  bicalutamide 50 milliGRAM(s) Oral daily  BUpivacaine liposome 1.3% Injectable (no eMAR) 20 milliLiter(s) Local Injection once  chlorhexidine 0.12% Liquid 15 milliLiter(s) Swish and Spit two times a day  dextrose 5%. 1000 milliLiter(s) (50 mL/Hr) IV Continuous <Continuous>  dextrose 50% Injectable 12.5 Gram(s) IV Push once  dextrose 50% Injectable 25 Gram(s) IV Push once  dextrose 50% Injectable 25 Gram(s) IV Push once  doxazosin 4 milliGRAM(s) Oral at bedtime  fentaNYL   Infusion 0.28 MICROgram(s)/kG/Hr (2.5 mL/Hr) IV Continuous <Continuous>  furosemide    Tablet 40 milliGRAM(s) Oral daily  insulin lispro (HumaLOG) corrective regimen sliding scale   SubCutaneous every 6 hours  lactobacillus acidophilus 1 Tablet(s) Oral daily  loratadine 10 milliGRAM(s) Oral daily  multivitamin 1 Tablet(s) Oral daily  pantoprazole    Tablet 40 milliGRAM(s) Oral before breakfast  pantoprazole  Injectable 40 milliGRAM(s) IV Push daily  piperacillin/tazobactam IVPB. 3.375 Gram(s) IV Intermittent every 6 hours  propofol Infusion 5.593 MICROgram(s)/kG/Min (3 mL/Hr) IV Continuous <Continuous>  sodium chloride 0.9% Bolus 1000 milliLiter(s) IV Bolus once  sodium chloride 0.9%. 1000 milliLiter(s) (100 mL/Hr) IV Continuous <Continuous>  vancomycin  IVPB 1500 milliGRAM(s) IV Intermittent every 24 hours    MEDICATIONS  (PRN):  dextrose Gel 1 Dose(s) Oral once PRN Blood Glucose LESS THAN 70 milliGRAM(s)/deciliter  glucagon  Injectable 1 milliGRAM(s) IntraMuscular once PRN Glucose LESS THAN 70 milligrams/deciliter  ondansetron Injectable 4 milliGRAM(s) IV Push every 6 hours PRN Nausea and/or Vomiting      A/P:  84y Male s/p revision L JOANNA POD 1  -Stable  -Follow up intra-op cxs  -Cont Vanc and Zosyn for broad-spect coverage untilfinal cx results return  -Pain Control  -PT/WBAT  -DVT ppx:  BID  -Care per SICU team; plan to extubate today	  -f/u AM labs  -Dispo: Pending

## 2017-11-28 NOTE — PROGRESS NOTE ADULT - ATTENDING COMMENTS
Patient seen and examined; Plans discussed on rounds regarding BP and urine output; Antibiotics to continue

## 2017-11-28 NOTE — PROVIDER CONTACT NOTE (OTHER) - SITUATION
HR 66, pt asleep. urine output 30-35 ml/hr for the last two years
pt in bed in the chair position. HR 76; pt reports feeling light headed. pt position adjusted to lay supine HOB 30 degrees. BP responded 110/50 (74) HR 68

## 2017-11-28 NOTE — PHYSICAL THERAPY INITIAL EVALUATION ADULT - GENERAL OBSERVATIONS, REHAB EVAL
Patient received supine in NAD, denies pain at rest +EKG, O2 NC 2L, a-line, IV, abduction pillow, B SCD, rodrigues. Left in chair position in NAD +lines intact, RN Lotus aware, call black in reach

## 2017-11-29 LAB
ANION GAP SERPL CALC-SCNC: 14 MMOL/L — SIGNIFICANT CHANGE UP (ref 5–17)
BUN SERPL-MCNC: 27 MG/DL — HIGH (ref 7–23)
CALCIUM SERPL-MCNC: 7.9 MG/DL — LOW (ref 8.4–10.5)
CHLORIDE SERPL-SCNC: 104 MMOL/L — SIGNIFICANT CHANGE UP (ref 96–108)
CO2 SERPL-SCNC: 22 MMOL/L — SIGNIFICANT CHANGE UP (ref 22–31)
CREAT SERPL-MCNC: 1.59 MG/DL — HIGH (ref 0.5–1.3)
GLUCOSE BLDC GLUCOMTR-MCNC: 105 MG/DL — HIGH (ref 70–99)
GLUCOSE BLDC GLUCOMTR-MCNC: 120 MG/DL — HIGH (ref 70–99)
GLUCOSE BLDC GLUCOMTR-MCNC: 126 MG/DL — HIGH (ref 70–99)
GLUCOSE BLDC GLUCOMTR-MCNC: 131 MG/DL — HIGH (ref 70–99)
GLUCOSE SERPL-MCNC: 107 MG/DL — HIGH (ref 70–99)
HCT VFR BLD CALC: 26 % — LOW (ref 39–50)
HGB BLD-MCNC: 8.9 G/DL — LOW (ref 13–17)
MAGNESIUM SERPL-MCNC: 1.8 MG/DL — SIGNIFICANT CHANGE UP (ref 1.6–2.6)
MCHC RBC-ENTMCNC: 29.2 PG — SIGNIFICANT CHANGE UP (ref 27–34)
MCHC RBC-ENTMCNC: 34.2 G/DL — SIGNIFICANT CHANGE UP (ref 32–36)
MCV RBC AUTO: 85.2 FL — SIGNIFICANT CHANGE UP (ref 80–100)
PHOSPHATE SERPL-MCNC: 3.4 MG/DL — SIGNIFICANT CHANGE UP (ref 2.5–4.5)
PLATELET # BLD AUTO: 129 K/UL — LOW (ref 150–400)
POTASSIUM SERPL-MCNC: 3.8 MMOL/L — SIGNIFICANT CHANGE UP (ref 3.5–5.3)
POTASSIUM SERPL-SCNC: 3.8 MMOL/L — SIGNIFICANT CHANGE UP (ref 3.5–5.3)
RBC # BLD: 3.05 M/UL — LOW (ref 4.2–5.8)
RBC # FLD: 14.6 % — SIGNIFICANT CHANGE UP (ref 10.3–16.9)
SODIUM SERPL-SCNC: 140 MMOL/L — SIGNIFICANT CHANGE UP (ref 135–145)
WBC # BLD: 7.6 K/UL — SIGNIFICANT CHANGE UP (ref 3.8–10.5)
WBC # FLD AUTO: 7.6 K/UL — SIGNIFICANT CHANGE UP (ref 3.8–10.5)

## 2017-11-29 PROCEDURE — 99233 SBSQ HOSP IP/OBS HIGH 50: CPT | Mod: GC

## 2017-11-29 PROCEDURE — 71010: CPT | Mod: 26

## 2017-11-29 RX ORDER — ACETAMINOPHEN 500 MG
650 TABLET ORAL EVERY 6 HOURS
Qty: 0 | Refills: 0 | Status: DISCONTINUED | OUTPATIENT
Start: 2017-11-29 | End: 2017-12-04

## 2017-11-29 RX ORDER — POTASSIUM CHLORIDE 20 MEQ
20 PACKET (EA) ORAL ONCE
Qty: 0 | Refills: 0 | Status: COMPLETED | OUTPATIENT
Start: 2017-11-29 | End: 2017-11-29

## 2017-11-29 RX ORDER — OXYCODONE AND ACETAMINOPHEN 5; 325 MG/1; MG/1
1 TABLET ORAL EVERY 4 HOURS
Qty: 0 | Refills: 0 | Status: DISCONTINUED | OUTPATIENT
Start: 2017-11-29 | End: 2017-11-29

## 2017-11-29 RX ORDER — MAGNESIUM SULFATE 500 MG/ML
1 VIAL (ML) INJECTION ONCE
Qty: 0 | Refills: 0 | Status: COMPLETED | OUTPATIENT
Start: 2017-11-29 | End: 2017-11-29

## 2017-11-29 RX ORDER — OXYCODONE HYDROCHLORIDE 5 MG/1
10 TABLET ORAL EVERY 6 HOURS
Qty: 0 | Refills: 0 | Status: DISCONTINUED | OUTPATIENT
Start: 2017-11-29 | End: 2017-11-29

## 2017-11-29 RX ADMIN — Medication 1 TABLET(S): at 11:07

## 2017-11-29 RX ADMIN — PIPERACILLIN AND TAZOBACTAM 200 GRAM(S): 4; .5 INJECTION, POWDER, LYOPHILIZED, FOR SOLUTION INTRAVENOUS at 17:25

## 2017-11-29 RX ADMIN — PIPERACILLIN AND TAZOBACTAM 200 GRAM(S): 4; .5 INJECTION, POWDER, LYOPHILIZED, FOR SOLUTION INTRAVENOUS at 11:07

## 2017-11-29 RX ADMIN — PIPERACILLIN AND TAZOBACTAM 200 GRAM(S): 4; .5 INJECTION, POWDER, LYOPHILIZED, FOR SOLUTION INTRAVENOUS at 00:36

## 2017-11-29 RX ADMIN — Medication 325 MILLIGRAM(S): at 17:22

## 2017-11-29 RX ADMIN — PIPERACILLIN AND TAZOBACTAM 200 GRAM(S): 4; .5 INJECTION, POWDER, LYOPHILIZED, FOR SOLUTION INTRAVENOUS at 06:15

## 2017-11-29 RX ADMIN — Medication 325 MILLIGRAM(S): at 06:15

## 2017-11-29 RX ADMIN — Medication 650 MILLIGRAM(S): at 11:07

## 2017-11-29 RX ADMIN — Medication 650 MILLIGRAM(S): at 12:00

## 2017-11-29 RX ADMIN — Medication 20 MILLIEQUIVALENT(S): at 08:28

## 2017-11-29 RX ADMIN — Medication 100 GRAM(S): at 08:28

## 2017-11-29 RX ADMIN — Medication 300 MILLIGRAM(S): at 06:15

## 2017-11-29 NOTE — PROGRESS NOTE ADULT - SUBJECTIVE AND OBJECTIVE BOX
INTERVAL HPI/OVERNIGHT EVENTS: O/N: started some prn pain meds through pt didn't really ask for them, stable.  11/28: Extubated in AM. diet advanced.  Given 500cc bolus and 250cc albumin for hypotension and low UO. heme/onc consulted for medication approval    Pt seen and examined at bedside. No current complaints. Denies pain, SOB, CP, N/V      MEDICATIONS  (STANDING):  aspirin enteric coated 325 milliGRAM(s) Oral two times a day  bicalutamide 50 milliGRAM(s) Oral daily  dextrose 5%. 1000 milliLiter(s) (50 mL/Hr) IV Continuous <Continuous>  dextrose 50% Injectable 12.5 Gram(s) IV Push once  dextrose 50% Injectable 25 Gram(s) IV Push once  dextrose 50% Injectable 25 Gram(s) IV Push once  insulin lispro (HumaLOG) corrective regimen sliding scale   SubCutaneous every 6 hours  lactobacillus acidophilus 1 Tablet(s) Oral daily  multivitamin 1 Tablet(s) Oral daily  piperacillin/tazobactam IVPB. 3.375 Gram(s) IV Intermittent every 6 hours  vancomycin  IVPB 1500 milliGRAM(s) IV Intermittent every 24 hours    MEDICATIONS  (PRN):  acetaminophen   Tablet. 650 milliGRAM(s) Oral every 6 hours PRN Mild Pain (1 - 3)  dextrose Gel 1 Dose(s) Oral once PRN Blood Glucose LESS THAN 70 milliGRAM(s)/deciliter  glucagon  Injectable 1 milliGRAM(s) IntraMuscular once PRN Glucose LESS THAN 70 milligrams/deciliter  ondansetron Injectable 4 milliGRAM(s) IV Push every 6 hours PRN Nausea and/or Vomiting      Drug Dosing Weight  Height (cm): 157.48 (27 Nov 2017 12:10)  Weight (kg): 89.4 (27 Nov 2017 12:10)  BMI (kg/m2): 36 (27 Nov 2017 12:10)  BSA (m2): 1.9 (27 Nov 2017 12:10)      PAST MEDICAL & SURGICAL HISTORY:  Hearing loss  Osteoarthritis  Fluid retention in legs  Seasonal allergies  Prostate cancer: 48 treatments of RT  Eye problem: corrective lens in the right  History of hip replacement, total, 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  S/P inguinal hernia repair: 1961 cannot remember which side  S/P cholecystectomy: 1985  History of appendectomy      ICU Vital Signs Last 24 Hrs  T(C): 37.2 (29 Nov 2017 05:41), Max: 37.8 (29 Nov 2017 00:37)  T(F): 98.9 (29 Nov 2017 05:41), Max: 100 (29 Nov 2017 00:37)  HR: 76 (29 Nov 2017 08:00) (68 - 82)  BP: 113/51 (29 Nov 2017 08:00) (70/51 - 127/58)  BP(mean): 73 (29 Nov 2017 08:00) (52 - 78)  ABP: 116/38 (29 Nov 2017 08:00) (76/36 - 126/44)  ABP(mean): 58 (29 Nov 2017 08:00) (48 - 70)  RR: 15 (28 Nov 2017 20:00) (12 - 18)  SpO2: 95% (29 Nov 2017 08:00) (93% - 100%)      ABG - ( 27 Nov 2017 22:51 )  pH: 7.46  /  pCO2: 36    /  pO2: 98    / HCO3: 25    / Base Excess: 1.3   /  SaO2: 98          28 Nov 2017 07:01  -  29 Nov 2017 07:00  --------------------------------------------------------  IN:    IV PiggyBack: 1250 mL    sodium chloride 0.9%: 2740 mL    Sodium Chloride 0.9% IV Bolus: 500 mL  Total IN: 4490 mL    OUT:    Indwelling Catheter - Urethral: 1002 mL  Total OUT: 1002 mL    Total NET: 3488 mL      29 Nov 2017 07:01  -  29 Nov 2017 08:40  --------------------------------------------------------  IN:    sodium chloride 0.9%: 130 mL  Total IN: 130 mL    OUT:    Indwelling Catheter - Urethral: 100 mL  Total OUT: 100 mL    Total NET: 30 mL      PHYSICAL EXAM:  Gen: NAD, resting comfortably in bed.  Neuro: A&Ox 3. CN II-XII grossly intact  CV: RRR, no murmur  Pulm: CTA x 2.   Abd: Soft, ND/NT throughout, no rebound or guarding  : rodrigues draining clear urine  Ext: L hip with provena vac in place, no surrounding erythema or edema, CDI  Skin: no rash        LABS:  CBC Full  -  ( 29 Nov 2017 05:33 )  WBC Count : 7.6 K/uL  Hemoglobin : 8.9 g/dL  Hematocrit : 26.0 %  Platelet Count - Automated : 129 K/uL  Mean Cell Volume : 85.2 fL  Mean Cell Hemoglobin : 29.2 pg  Mean Cell Hemoglobin Concentration : 34.2 g/dL      11-29    140  |  104  |  27<H>  ----------------------------<  107<H>  3.8   |  22  |  1.59<H>    Ca    7.9<L>      29 Nov 2017 05:35  Phos  3.4     11-29  Mg     1.8     11-29    TPro  4.6<L>  /  Alb  2.5<L>  /  TBili  0.6  /  DBili  x   /  AST  83<H>  /  ALT  69<H>  /  AlkPhos  142<H>  11-28    PT/INR - ( 27 Nov 2017 23:07 )   PT: 13.9 sec;   INR: 1.25          PTT - ( 27 Nov 2017 23:07 )  PTT:27.0 sec

## 2017-11-29 NOTE — PROGRESS NOTE ADULT - SUBJECTIVE AND OBJECTIVE BOX
S: No acute events overnight. Extubated. Feeling well.    Vital Signs Last 24 Hrs  T(C): 37.2 (29 Nov 2017 05:41), Max: 37.8 (29 Nov 2017 00:37)  T(F): 98.9 (29 Nov 2017 05:41), Max: 100 (29 Nov 2017 00:37)  HR: 82 (29 Nov 2017 05:00) (68 - 82)  BP: 127/58 (29 Nov 2017 05:00) (70/51 - 127/58)  BP(mean): 78 (29 Nov 2017 05:00) (52 - 78)  RR: 15 (28 Nov 2017 20:00) (12 - 18)  SpO2: 93% (29 Nov 2017 05:00) (93% - 100%)  I&O's Summary    27 Nov 2017 07:01  -  28 Nov 2017 07:00  --------------------------------------------------------  IN: 2487 mL / OUT: 685 mL / NET: 1802 mL    28 Nov 2017 07:01  -  29 Nov 2017 06:04  --------------------------------------------------------  IN: 3760 mL / OUT: 867 mL / NET: 2893 mL        Prevena L Hip CDI  Motor: 5/5 GS/TA/EHL/FHL    Sensation: SILT sural, saph, DP, SP and tibial n distribution  Pulses: WWP, DP/PT 2+                            8.9    7.6   )-----------( 129      ( 29 Nov 2017 05:33 )             26.0     11-28    140  |  104  |  27<H>  ----------------------------<  154<H>  4.2   |  21<L>  |  1.43<H>    Ca    8.2<L>      28 Nov 2017 04:44  Phos  4.9     11-28  Mg     2.1     11-28    TPro  4.6<L>  /  Alb  2.5<L>  /  TBili  0.6  /  DBili  x   /  AST  83<H>  /  ALT  69<H>  /  AlkPhos  142<H>  11-28      Culture - Tissue with Gram Stain (collected 27 Nov 2017 22:03)  Source: .Tissue left hip sample 2  Gram Stain (28 Nov 2017 12:00):    No organisms seen    Rare White blood cells  Preliminary Report (28 Nov 2017 12:00):    No growth to date    Culture - Tissue with Gram Stain (collected 27 Nov 2017 22:03)  Source: .Tissue lef hip sample 1  Gram Stain (28 Nov 2017 11:58):    No organisms seen    Rare White blood cells  Preliminary Report (28 Nov 2017 11:59):    No growth to date    Culture - Tissue with Gram Stain (collected 27 Nov 2017 22:02)  Source: .Tissue post prosthetic tissue  Gram Stain (28 Nov 2017 12:01):    No organisms seen    Rare White blood cells  Preliminary Report (28 Nov 2017 12:01):    No growth to date      MEDICATIONS  (STANDING):  aspirin enteric coated 325 milliGRAM(s) Oral two times a day  bicalutamide 50 milliGRAM(s) Oral daily  dextrose 5%. 1000 milliLiter(s) (50 mL/Hr) IV Continuous <Continuous>  dextrose 50% Injectable 12.5 Gram(s) IV Push once  dextrose 50% Injectable 25 Gram(s) IV Push once  dextrose 50% Injectable 25 Gram(s) IV Push once  insulin lispro (HumaLOG) corrective regimen sliding scale   SubCutaneous every 6 hours  lactobacillus acidophilus 1 Tablet(s) Oral daily  loratadine 10 milliGRAM(s) Oral daily  multivitamin 1 Tablet(s) Oral daily  piperacillin/tazobactam IVPB. 3.375 Gram(s) IV Intermittent every 6 hours  sodium chloride 0.9%. 1000 milliLiter(s) (130 mL/Hr) IV Continuous <Continuous>  vancomycin  IVPB 1500 milliGRAM(s) IV Intermittent every 24 hours    MEDICATIONS  (PRN):  acetaminophen   Tablet. 650 milliGRAM(s) Oral every 6 hours PRN Mild Pain (1 - 3)  dextrose Gel 1 Dose(s) Oral once PRN Blood Glucose LESS THAN 70 milliGRAM(s)/deciliter  glucagon  Injectable 1 milliGRAM(s) IntraMuscular once PRN Glucose LESS THAN 70 milligrams/deciliter  ondansetron Injectable 4 milliGRAM(s) IV Push every 6 hours PRN Nausea and/or Vomiting  oxyCODONE    5 mG/acetaminophen 325 mG 1 Tablet(s) Oral every 4 hours PRN Moderate Pain (4 - 6)  oxyCODONE    IR 10 milliGRAM(s) Oral every 6 hours PRN Severe Pain (7 - 10)      A/P:  84y Male s/p revision L JOANNA POD 2  -Stable  -Follow up intra-op cxs  -Cont Vanc and Zosyn for broad-spect coverage untilfinal cx results return (so far NGTD)  -Pain Control  -PT/WBAT  -DVT ppx:  BID  -Care per SICU team	  -f/u AM labs  -Dispo: Pending

## 2017-11-29 NOTE — OCCUPATIONAL THERAPY INITIAL EVALUATION ADULT - GENERAL OBSERVATIONS, REHAB EVAL
Patient cleared for Occupational Therapy by Ortho (ALLI WBAT) and SIMI Gautam. Patient received supine in semi-schmid position in non-acute distress, +EKG, +picc, +IV, +Carmen, +abduction pillow, +NC O2 2L/min,+ left hip dressing C/D/I, +wound vac to left hip, + bilateral sequential compression devices.

## 2017-11-29 NOTE — OCCUPATIONAL THERAPY INITIAL EVALUATION ADULT - PLANNED THERAPY INTERVENTIONS, OT EVAL
transfer training/ADL retraining/bed mobility training/endurance training/IADL retraining/balance training/neuromuscular re-education

## 2017-11-29 NOTE — DIETITIAN INITIAL EVALUATION ADULT. - ENERGY NEEDS
54 kg IBW; 89 kg ABW; 165% of IBW; BMI 36; 158 cm.  IBW used due to pt > 120% of IBW.  needs increased post op.

## 2017-11-29 NOTE — PROGRESS NOTE ADULT - SUBJECTIVE AND OBJECTIVE BOX
POST OPERATIVE DAY #: 2  STATUS POST: Revision  Left THR                        SUBJECTIVE: Patient seen and examined. Pt. states he did PT the other day but blood pressure was too low. Pt. hasn't gotten up with PT today.     Pain:  well controlled      OBJECTIVE:     Vital Signs Last 24 Hrs  T(C): 36.3 (29 Nov 2017 14:00), Max: 37.8 (29 Nov 2017 00:37)  T(F): 97.4 (29 Nov 2017 14:00), Max: 100 (29 Nov 2017 00:37)  HR: 74 (29 Nov 2017 15:00) (72 - 86)  BP: 100/43 (29 Nov 2017 15:00) (91/59 - 140/58)  BP(mean): 66 (29 Nov 2017 15:00) (56 - 91)  RR: 12 (29 Nov 2017 15:00) (12 - 39)  SpO2: 93% (29 Nov 2017 15:00) (93% - 97%)    Affected extremity:          Dressing: clean/dry/intact prevena in place         Sensation: intact to light touch to patient's baseline         Motor exam: EHL/TA/GS 5/5 b/l les             I&O's Detail    28 Nov 2017 07:01  -  29 Nov 2017 07:00  --------------------------------------------------------  IN:    IV PiggyBack: 1250 mL    sodium chloride 0.9%: 2740 mL    Sodium Chloride 0.9% IV Bolus: 500 mL  Total IN: 4490 mL    OUT:    Indwelling Catheter - Urethral: 1002 mL  Total OUT: 1002 mL    Total NET: 3488 mL      29 Nov 2017 07:01  -  29 Nov 2017 15:15  --------------------------------------------------------  IN:    IV PiggyBack: 100 mL    sodium chloride 0.9%: 130 mL  Total IN: 230 mL    OUT:    Indwelling Catheter - Urethral: 635 mL  Total OUT: 635 mL    Total NET: -405 mL          LABS:                        8.9    7.6   )-----------( 129      ( 29 Nov 2017 05:33 )             26.0     11-29    140  |  104  |  27<H>  ----------------------------<  107<H>  3.8   |  22  |  1.59<H>    Ca    7.9<L>      29 Nov 2017 05:35  Phos  3.4     11-29  Mg     1.8     11-29    TPro  4.6<L>  /  Alb  2.5<L>  /  TBili  0.6  /  DBili  x   /  AST  83<H>  /  ALT  69<H>  /  AlkPhos  142<H>  11-28    PT/INR - ( 27 Nov 2017 23:07 )   PT: 13.9 sec;   INR: 1.25          PTT - ( 27 Nov 2017 23:07 )  PTT:27.0 sec      MEDICATIONS:  piperacillin/tazobactam IVPB. 3.375 Gram(s) IV Intermittent every 6 hours  vancomycin  IVPB 1500 milliGRAM(s) IV Intermittent every 24 hours    acetaminophen   Tablet. 650 milliGRAM(s) Oral every 6 hours PRN  ondansetron Injectable 4 milliGRAM(s) IV Push every 6 hours PRN    aspirin enteric coated 325 milliGRAM(s) Oral two times a day      RADIOLOGY & ADDITIONAL STUDIES:    ASSESSMENT AND PLAN: 83yo Male s/p revision left thr    1. Analgesic pain control  2. DVT prophylaxis: ASA        3. Weight Bearing Status:  Weight bearing as tolerated         4. Disposition:      pending PT evaluation POST OPERATIVE DAY #: 2  STATUS POST: Revision  Left THR                        SUBJECTIVE: Patient seen and examined. Pt. states he did PT the other day but blood pressure was too low. Pt. hasn't gotten up with PT today.Denies any SOB/chest pain.     Pain:  well controlled      OBJECTIVE:     Vital Signs Last 24 Hrs  T(C): 36.3 (29 Nov 2017 14:00), Max: 37.8 (29 Nov 2017 00:37)  T(F): 97.4 (29 Nov 2017 14:00), Max: 100 (29 Nov 2017 00:37)  HR: 74 (29 Nov 2017 15:00) (72 - 86)  BP: 100/43 (29 Nov 2017 15:00) (91/59 - 140/58)  BP(mean): 66 (29 Nov 2017 15:00) (56 - 91)  RR: 12 (29 Nov 2017 15:00) (12 - 39)  SpO2: 93% (29 Nov 2017 15:00) (93% - 97%)    Affected extremity:          Dressing: clean/dry/intact prevena in place         Sensation: intact to light touch to patient's baseline         Motor exam: EHL/TA/GS 5/5 b/l les             I&O's Detail    28 Nov 2017 07:01  -  29 Nov 2017 07:00  --------------------------------------------------------  IN:    IV PiggyBack: 1250 mL    sodium chloride 0.9%: 2740 mL    Sodium Chloride 0.9% IV Bolus: 500 mL  Total IN: 4490 mL    OUT:    Indwelling Catheter - Urethral: 1002 mL  Total OUT: 1002 mL    Total NET: 3488 mL      29 Nov 2017 07:01  -  29 Nov 2017 15:15  --------------------------------------------------------  IN:    IV PiggyBack: 100 mL    sodium chloride 0.9%: 130 mL  Total IN: 230 mL    OUT:    Indwelling Catheter - Urethral: 635 mL  Total OUT: 635 mL    Total NET: -405 mL          LABS:                        8.9    7.6   )-----------( 129      ( 29 Nov 2017 05:33 )             26.0     11-29    140  |  104  |  27<H>  ----------------------------<  107<H>  3.8   |  22  |  1.59<H>    Ca    7.9<L>      29 Nov 2017 05:35  Phos  3.4     11-29  Mg     1.8     11-29    TPro  4.6<L>  /  Alb  2.5<L>  /  TBili  0.6  /  DBili  x   /  AST  83<H>  /  ALT  69<H>  /  AlkPhos  142<H>  11-28    PT/INR - ( 27 Nov 2017 23:07 )   PT: 13.9 sec;   INR: 1.25          PTT - ( 27 Nov 2017 23:07 )  PTT:27.0 sec      MEDICATIONS:  piperacillin/tazobactam IVPB. 3.375 Gram(s) IV Intermittent every 6 hours  vancomycin  IVPB 1500 milliGRAM(s) IV Intermittent every 24 hours    acetaminophen   Tablet. 650 milliGRAM(s) Oral every 6 hours PRN  ondansetron Injectable 4 milliGRAM(s) IV Push every 6 hours PRN    aspirin enteric coated 325 milliGRAM(s) Oral two times a day      RADIOLOGY & ADDITIONAL STUDIES:    ASSESSMENT AND PLAN: 83yo Male s/p revision left thr    1. Analgesic pain control  2. DVT prophylaxis: ASA        3. Weight Bearing Status:  Weight bearing as tolerated         4. Disposition:      pending PT evaluation

## 2017-11-29 NOTE — OCCUPATIONAL THERAPY INITIAL EVALUATION ADULT - ADDITIONAL COMMENTS
Patient reports being in subacute rehabilitation for several weeks where was NWB, states being able to hop on right lower extremity with RW, assisted for all Activities of Daily Living other than feeding. Prior to left JOANNA on 8/17/17, patient reports being independent with functional mobility and Activities of Daily Living using RW in home, instrumental activities of daily living taken care of by facility.

## 2017-11-29 NOTE — OCCUPATIONAL THERAPY INITIAL EVALUATION ADULT - STANDING BALANCE: DYNAMIC, REHAB EVAL
ambulated 6 bedside steps with modAX2, RW and verbal/tactile cues for proper WS and sequencing/poor balance

## 2017-11-29 NOTE — DIETITIAN INITIAL EVALUATION ADULT. - OTHER INFO
84y Male s/p Revision Left THR. Reports a lack of appetite at present s/p surgery. States no diet restrictions PTA & no weight changes. No chewing/swallowing difficulty. States he consumed ~60% of breakfast this morning. Last BM 3 days ago. - no n/v/d & no pain.

## 2017-11-29 NOTE — OCCUPATIONAL THERAPY INITIAL EVALUATION ADULT - VISUAL ASSESSMENT: TRACKING
patient can track in all planes above, decreased smoothness of pursuit/left to right/right to left/up gaze/down gaze

## 2017-11-29 NOTE — OCCUPATIONAL THERAPY INITIAL EVALUATION ADULT - PERTINENT HX OF CURRENT PROBLEM, REHAB EVAL
84 y.o. male s/p left hip spacer placement for infected left JOANNA. Patient had initial left JOANNA on 8/17/17 and revision/spacer placement on 9/4/17 at [facility]. Patient states pain to left hip is 4/10, and c/o numbness to b/l big toes due his sheets feeling too tight. Patient has been in a rehab center and has not been walking. Patient had aspiration of left hip joint 11/6/17 that did not show growth. He has been treated with abx. S/P left revision total hip replacement 11/27/2017.

## 2017-11-30 LAB
ANION GAP SERPL CALC-SCNC: 13 MMOL/L — SIGNIFICANT CHANGE UP (ref 5–17)
BUN SERPL-MCNC: 25 MG/DL — HIGH (ref 7–23)
CALCIUM SERPL-MCNC: 8.3 MG/DL — LOW (ref 8.4–10.5)
CHLORIDE SERPL-SCNC: 104 MMOL/L — SIGNIFICANT CHANGE UP (ref 96–108)
CO2 SERPL-SCNC: 23 MMOL/L — SIGNIFICANT CHANGE UP (ref 22–31)
CREAT SERPL-MCNC: 1.67 MG/DL — HIGH (ref 0.5–1.3)
CULTURE RESULTS: NO GROWTH — SIGNIFICANT CHANGE UP
GLUCOSE SERPL-MCNC: 108 MG/DL — HIGH (ref 70–99)
HCT VFR BLD CALC: 26.7 % — LOW (ref 39–50)
HGB BLD-MCNC: 8.7 G/DL — LOW (ref 13–17)
MAGNESIUM SERPL-MCNC: 2 MG/DL — SIGNIFICANT CHANGE UP (ref 1.6–2.6)
MCHC RBC-ENTMCNC: 28.8 PG — SIGNIFICANT CHANGE UP (ref 27–34)
MCHC RBC-ENTMCNC: 32.6 G/DL — SIGNIFICANT CHANGE UP (ref 32–36)
MCV RBC AUTO: 88.4 FL — SIGNIFICANT CHANGE UP (ref 80–100)
PHOSPHATE SERPL-MCNC: 2.9 MG/DL — SIGNIFICANT CHANGE UP (ref 2.5–4.5)
PLATELET # BLD AUTO: 149 K/UL — LOW (ref 150–400)
POTASSIUM SERPL-MCNC: 4 MMOL/L — SIGNIFICANT CHANGE UP (ref 3.5–5.3)
POTASSIUM SERPL-SCNC: 4 MMOL/L — SIGNIFICANT CHANGE UP (ref 3.5–5.3)
RBC # BLD: 3.02 M/UL — LOW (ref 4.2–5.8)
RBC # FLD: 14.2 % — SIGNIFICANT CHANGE UP (ref 10.3–16.9)
SODIUM SERPL-SCNC: 140 MMOL/L — SIGNIFICANT CHANGE UP (ref 135–145)
SPECIMEN SOURCE: SIGNIFICANT CHANGE UP
VANCOMYCIN TROUGH SERPL-MCNC: 15.6 UG/ML — SIGNIFICANT CHANGE UP (ref 10–20)
WBC # BLD: 6.8 K/UL — SIGNIFICANT CHANGE UP (ref 3.8–10.5)
WBC # FLD AUTO: 6.8 K/UL — SIGNIFICANT CHANGE UP (ref 3.8–10.5)

## 2017-11-30 PROCEDURE — 71010: CPT | Mod: 26

## 2017-11-30 RX ADMIN — Medication 1 TABLET(S): at 11:38

## 2017-11-30 RX ADMIN — Medication 325 MILLIGRAM(S): at 06:38

## 2017-11-30 RX ADMIN — PIPERACILLIN AND TAZOBACTAM 200 GRAM(S): 4; .5 INJECTION, POWDER, LYOPHILIZED, FOR SOLUTION INTRAVENOUS at 06:05

## 2017-11-30 RX ADMIN — Medication 325 MILLIGRAM(S): at 17:33

## 2017-11-30 RX ADMIN — Medication 300 MILLIGRAM(S): at 11:38

## 2017-11-30 RX ADMIN — PIPERACILLIN AND TAZOBACTAM 200 GRAM(S): 4; .5 INJECTION, POWDER, LYOPHILIZED, FOR SOLUTION INTRAVENOUS at 14:36

## 2017-11-30 RX ADMIN — PIPERACILLIN AND TAZOBACTAM 200 GRAM(S): 4; .5 INJECTION, POWDER, LYOPHILIZED, FOR SOLUTION INTRAVENOUS at 00:54

## 2017-11-30 NOTE — PROGRESS NOTE ADULT - SUBJECTIVE AND OBJECTIVE BOX
ORTHO NOTE    [x ] Pt seen/examined.  [x ] Pt without any complaints/in NAD.    [ ] Pt complains of:      ROS: [ ] Fever  [ ] Chills  [ ] CP [ ] SOB [ ] Dysnea  [ ] Palpitations [ ] Cough [ ] N/V/C/D [ ] Paresthia [ ] Other     [x ] ROS  otherwise negative    .    PHYSICAL EXAM:    Vital Signs Last 24 Hrs  T(C): 36.7 (30 Nov 2017 13:45), Max: 36.9 (30 Nov 2017 08:45)  T(F): 98.1 (30 Nov 2017 13:45), Max: 98.4 (30 Nov 2017 08:45)  HR: 86 (30 Nov 2017 13:45) (76 - 89)  BP: 158/68 (30 Nov 2017 13:45) (111/46 - 158/68)  BP(mean): 76 (29 Nov 2017 19:00) (68 - 76)  RR: 16 (30 Nov 2017 13:45) (16 - 21)  SpO2: 94% (30 Nov 2017 13:45) (92% - 96%)    I&O's Detail    29 Nov 2017 07:01  -  30 Nov 2017 07:00  --------------------------------------------------------  IN:    IV PiggyBack: 200 mL    Oral Fluid: 350 mL    sodium chloride 0.9%: 130 mL  Total IN: 680 mL    OUT:    Indwelling Catheter - Urethral: 1870 mL  Total OUT: 1870 mL    Total NET: -1190 mL      30 Nov 2017 07:01  -  30 Nov 2017 17:06  --------------------------------------------------------  IN:    Oral Fluid: 500 mL  Total IN: 500 mL    OUT:  Total OUT: 0 mL    Total NET: 500 mL           CAPILLARY BLOOD GLUCOSE                      Neuro: AAOX3    Lungs: CXR shows atelectasis, Spo2 90-91% on RA and 2L NC applied, IS demonstrated    CV: NSR, HR stable, BP controlled    ABD: soft, nontender    Ext: left posterior hip prevena plus to suction, L LE NVID motor 5/5    LABS                        8.7    6.8   )-----------( 149      ( 30 Nov 2017 08:09 )             26.7                                11-30    140  |  104  |  25<H>  ----------------------------<  108<H>  4.0   |  23  |  1.67<H>    Ca    8.3<L>      30 Nov 2017 08:09  Phos  2.9     11-30  Mg     2.0     11-30        [ ] Other Labs  [ ] None ordered            Please check or [place] when present:  •  Heart Failure:    [ ] Acute        [ ]  Acute on Chronic        [ ] Chronic         [ ] Diastolic     [ ]  Combined    •  FABY:     [ ] ATN        [ ]  Renal medullary necrosis       [ ]  Renal cortical necrosis                  [ ] Other pathological Lesion:  •  CKD:  [ ] Stage I   [ ] Stage II  [ ] Stage III    [ ]Stage IV   [ ]  CKD V   [ ]  Other/Unspecified:    •  Abdominal Nutritional Status:   [ ] Malnutrition-See Nutrition note    [ ] Cachexia   [ ]  Other        [ ] Supplement ordered:            [x ] Morbid Obesity: BMI >=40         ASSESSMENT/PLAN:      STATUS POST: L JOANNA posterior revision  H/H stable  IS pushed and OOB encouraged  abductor pillow  medicine recommendations appreciated  hypoxia secondary to atelectasis  CONTINUE:          [ ] PT- wbat, posterior hip precautions    [ ] DVT PPX- scd, ASA bid     [ ] Pain Mgt- po meds    [ ] Dispo plan- DARRYL

## 2017-11-30 NOTE — PROGRESS NOTE ADULT - SUBJECTIVE AND OBJECTIVE BOX
S: No acute events overnight.    Vital Signs Last 24 Hrs  T(C): 36.8 (30 Nov 2017 04:54), Max: 37.4 (29 Nov 2017 09:00)  T(F): 98.2 (30 Nov 2017 04:54), Max: 99.4 (29 Nov 2017 09:00)  HR: 76 (30 Nov 2017 04:54) (74 - 86)  BP: 118/57 (30 Nov 2017 04:54) (91/59 - 140/58)  BP(mean): 76 (29 Nov 2017 19:00) (61 - 91)  RR: 17 (30 Nov 2017 04:54) (12 - 39)  SpO2: 96% (30 Nov 2017 04:54) (92% - 96%)  I&O's Summary    28 Nov 2017 07:01  -  29 Nov 2017 07:00  --------------------------------------------------------  IN: 4490 mL / OUT: 1002 mL / NET: 3488 mL    29 Nov 2017 07:01  -  30 Nov 2017 06:07  --------------------------------------------------------  IN: 680 mL / OUT: 1870 mL / NET: -1190 mL        Dressing / Prevena CDI  Motor: 5/5 GS/TA/EHL/FHL    Sensation: SILT sural, saph, DP, SP and tibial n distribution  Pulses: WWP, DP/PT 2+                            8.9    7.6   )-----------( 129      ( 29 Nov 2017 05:33 )             26.0     11-29    140  |  104  |  27<H>  ----------------------------<  107<H>  3.8   |  22  |  1.59<H>    Ca    7.9<L>      29 Nov 2017 05:35  Phos  3.4     11-29  Mg     1.8     11-29        Culture - Tissue with Gram Stain (collected 27 Nov 2017 22:03)  Source: .Tissue left hip sample 2  Gram Stain (28 Nov 2017 12:00):    No organisms seen    Rare White blood cells  Preliminary Report (28 Nov 2017 12:00):    No growth to date    Culture - Tissue with Gram Stain (collected 27 Nov 2017 22:03)  Source: .Tissue lef hip sample 1  Gram Stain (28 Nov 2017 11:58):    No organisms seen    Rare White blood cells  Preliminary Report (28 Nov 2017 11:59):    No growth to date    Culture - Tissue with Gram Stain (collected 27 Nov 2017 22:02)  Source: .Tissue post prosthetic tissue  Gram Stain (28 Nov 2017 12:01):    No organisms seen    Rare White blood cells  Preliminary Report (28 Nov 2017 12:01):    No growth to date      MEDICATIONS  (STANDING):  aspirin enteric coated 325 milliGRAM(s) Oral two times a day  dextrose 5%. 1000 milliLiter(s) (50 mL/Hr) IV Continuous <Continuous>  dextrose 50% Injectable 12.5 Gram(s) IV Push once  dextrose 50% Injectable 25 Gram(s) IV Push once  dextrose 50% Injectable 25 Gram(s) IV Push once  lactobacillus acidophilus 1 Tablet(s) Oral daily  multivitamin 1 Tablet(s) Oral daily  piperacillin/tazobactam IVPB. 3.375 Gram(s) IV Intermittent every 6 hours  vancomycin  IVPB 1500 milliGRAM(s) IV Intermittent every 24 hours    MEDICATIONS  (PRN):  acetaminophen   Tablet. 650 milliGRAM(s) Oral every 6 hours PRN Mild Pain (1 - 3)  dextrose Gel 1 Dose(s) Oral once PRN Blood Glucose LESS THAN 70 milliGRAM(s)/deciliter  glucagon  Injectable 1 milliGRAM(s) IntraMuscular once PRN Glucose LESS THAN 70 milligrams/deciliter  ondansetron Injectable 4 milliGRAM(s) IV Push every 6 hours PRN Nausea and/or Vomiting      A/P:  84y Male s/p revision L JOANNA POD 3  -Stable  -Follow up intra-op cxs  -Cont Vanc and Zosyn for broad-spect coverage untilfinal cx results return (so far NGTD)  -Pain Control  -PT/WBAT  -DVT ppx:  BID  -f/u AM labs  -Dispo: Pending

## 2017-12-01 DIAGNOSIS — E66.2 MORBID (SEVERE) OBESITY WITH ALVEOLAR HYPOVENTILATION: ICD-10-CM

## 2017-12-01 DIAGNOSIS — R09.02 HYPOXEMIA: ICD-10-CM

## 2017-12-01 DIAGNOSIS — N18.3 CHRONIC KIDNEY DISEASE, STAGE 3 (MODERATE): ICD-10-CM

## 2017-12-01 LAB
ANION GAP SERPL CALC-SCNC: 13 MMOL/L — SIGNIFICANT CHANGE UP (ref 5–17)
BUN SERPL-MCNC: 23 MG/DL — SIGNIFICANT CHANGE UP (ref 7–23)
CALCIUM SERPL-MCNC: 8.9 MG/DL — SIGNIFICANT CHANGE UP (ref 8.4–10.5)
CHLORIDE SERPL-SCNC: 103 MMOL/L — SIGNIFICANT CHANGE UP (ref 96–108)
CO2 SERPL-SCNC: 26 MMOL/L — SIGNIFICANT CHANGE UP (ref 22–31)
CREAT SERPL-MCNC: 1.49 MG/DL — HIGH (ref 0.5–1.3)
GLUCOSE SERPL-MCNC: 144 MG/DL — HIGH (ref 70–99)
HCT VFR BLD CALC: 29.5 % — LOW (ref 39–50)
HGB BLD-MCNC: 9.4 G/DL — LOW (ref 13–17)
MCHC RBC-ENTMCNC: 28.5 PG — SIGNIFICANT CHANGE UP (ref 27–34)
MCHC RBC-ENTMCNC: 31.9 G/DL — LOW (ref 32–36)
MCV RBC AUTO: 89.4 FL — SIGNIFICANT CHANGE UP (ref 80–100)
PLATELET # BLD AUTO: 240 K/UL — SIGNIFICANT CHANGE UP (ref 150–400)
POTASSIUM SERPL-MCNC: 3.6 MMOL/L — SIGNIFICANT CHANGE UP (ref 3.5–5.3)
POTASSIUM SERPL-SCNC: 3.6 MMOL/L — SIGNIFICANT CHANGE UP (ref 3.5–5.3)
RBC # BLD: 3.3 M/UL — LOW (ref 4.2–5.8)
RBC # FLD: 14.2 % — SIGNIFICANT CHANGE UP (ref 10.3–16.9)
SODIUM SERPL-SCNC: 142 MMOL/L — SIGNIFICANT CHANGE UP (ref 135–145)
WBC # BLD: 6.9 K/UL — SIGNIFICANT CHANGE UP (ref 3.8–10.5)
WBC # FLD AUTO: 6.9 K/UL — SIGNIFICANT CHANGE UP (ref 3.8–10.5)

## 2017-12-01 PROCEDURE — 99222 1ST HOSP IP/OBS MODERATE 55: CPT

## 2017-12-01 PROCEDURE — 71010: CPT | Mod: 26

## 2017-12-01 RX ORDER — IPRATROPIUM/ALBUTEROL SULFATE 18-103MCG
3 AEROSOL WITH ADAPTER (GRAM) INHALATION ONCE
Qty: 0 | Refills: 0 | Status: COMPLETED | OUTPATIENT
Start: 2017-12-01 | End: 2017-12-01

## 2017-12-01 RX ORDER — ALBUTEROL 90 UG/1
1 AEROSOL, METERED ORAL EVERY 4 HOURS
Qty: 0 | Refills: 0 | Status: DISCONTINUED | OUTPATIENT
Start: 2017-12-01 | End: 2017-12-01

## 2017-12-01 RX ORDER — IPRATROPIUM/ALBUTEROL SULFATE 18-103MCG
3 AEROSOL WITH ADAPTER (GRAM) INHALATION EVERY 6 HOURS
Qty: 0 | Refills: 0 | Status: DISCONTINUED | OUTPATIENT
Start: 2017-12-01 | End: 2017-12-04

## 2017-12-01 RX ORDER — PIPERACILLIN AND TAZOBACTAM 4; .5 G/20ML; G/20ML
3.38 INJECTION, POWDER, LYOPHILIZED, FOR SOLUTION INTRAVENOUS EVERY 6 HOURS
Qty: 0 | Refills: 0 | Status: DISCONTINUED | OUTPATIENT
Start: 2017-12-01 | End: 2017-12-01

## 2017-12-01 RX ORDER — TIOTROPIUM BROMIDE 18 UG/1
1 CAPSULE ORAL; RESPIRATORY (INHALATION) DAILY
Qty: 0 | Refills: 0 | Status: DISCONTINUED | OUTPATIENT
Start: 2017-12-01 | End: 2017-12-04

## 2017-12-01 RX ADMIN — Medication 1 TABLET(S): at 12:49

## 2017-12-01 RX ADMIN — Medication 3 MILLILITER(S): at 18:05

## 2017-12-01 RX ADMIN — Medication 3 MILLILITER(S): at 09:49

## 2017-12-01 RX ADMIN — Medication 325 MILLIGRAM(S): at 07:02

## 2017-12-01 RX ADMIN — Medication 325 MILLIGRAM(S): at 18:05

## 2017-12-01 RX ADMIN — PIPERACILLIN AND TAZOBACTAM 200 GRAM(S): 4; .5 INJECTION, POWDER, LYOPHILIZED, FOR SOLUTION INTRAVENOUS at 09:50

## 2017-12-01 RX ADMIN — Medication 3 MILLILITER(S): at 12:49

## 2017-12-01 NOTE — CONSULT NOTE ADULT - SUBJECTIVE AND OBJECTIVE BOX
CC: Left hip infection     HPI: 84 y.o. male s/p left hip spacer placement for infected left JOANNA. Patient had initial left JOANNA on 8/17/17 and revision/spacer placement on 9/4/17 at [facility]. Patient states pain to left hip is 4/10, and c/o numbness to b/l big toes due his sheets feeling too tight. Patient has been in a rehab center and has not been walking.       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  S/P cholecystectomy  1985  S/P inguinal hernia repair  1961 cannot remember which side.    Family History     Father  Family history of coronary artery disease, Age at diagnosis: Age Unknown     Mother  Family history of rheumatic heart disease, Age at diagnosis: Age Unknown.    Social history:   Denies smoking, alcohol or drug use      Patient is a 84y old  Male who presents with a chief complaint of s/p infected left hip (28 Nov 2017 13:49)      INTERVAL HPI/OVERNIGHT EVENTS: No acute overnight events.      Review of Systems: 12 point review of systems otherwise negative  ( - )fevers/chills  ( - ) dyspnea  ( - ) cough  ( - ) chest pain  ( - ) palpatations  ( - ) dizziness/lightheadedness  ( - ) nausea/vomiting  ( - ) abd pain  ( - ) diarrhea  ( - ) melena  ( - ) hematochezia  ( - ) dysuria  ( - ) hematuria  ( - ) leg swelling  ( -) calf tenderness  ( - ) motor weakness  ( - ) extremity numbness  ( - ) back pain  ( + ) tolerating POs  ( + ) BM    MEDICATIONS  (STANDING):  ALBUTerol    90 MICROgram(s) HFA Inhaler 1 Puff(s) Inhalation every 4 hours  ALBUTerol/ipratropium for Nebulization 3 milliLiter(s) Nebulizer every 6 hours  aspirin enteric coated 325 milliGRAM(s) Oral two times a day  dextrose 5%. 1000 milliLiter(s) (50 mL/Hr) IV Continuous <Continuous>  dextrose 50% Injectable 12.5 Gram(s) IV Push once  dextrose 50% Injectable 25 Gram(s) IV Push once  dextrose 50% Injectable 25 Gram(s) IV Push once  lactobacillus acidophilus 1 Tablet(s) Oral daily  multivitamin 1 Tablet(s) Oral daily  piperacillin/tazobactam IVPB. 3.375 Gram(s) IV Intermittent every 6 hours  tiotropium 18 MICROgram(s) Capsule 1 Capsule(s) Inhalation daily    MEDICATIONS  (PRN):  acetaminophen   Tablet. 650 milliGRAM(s) Oral every 6 hours PRN Mild Pain (1 - 3)  dextrose Gel 1 Dose(s) Oral once PRN Blood Glucose LESS THAN 70 milliGRAM(s)/deciliter  glucagon  Injectable 1 milliGRAM(s) IntraMuscular once PRN Glucose LESS THAN 70 milligrams/deciliter  ondansetron Injectable 4 milliGRAM(s) IV Push every 6 hours PRN Nausea and/or Vomiting      Allergies    No Known Allergies    Intolerances          Vital Signs Last 24 Hrs  T(C): 37 (01 Dec 2017 13:50), Max: 37.1 (01 Dec 2017 04:40)  T(F): 98.6 (01 Dec 2017 13:50), Max: 98.8 (01 Dec 2017 04:40)  HR: 81 (01 Dec 2017 13:50) (72 - 83)  BP: 129/59 (01 Dec 2017 13:50) (125/56 - 151/70)  BP(mean): --  RR: 15 (01 Dec 2017 13:50) (15 - 117)  SpO2: 100% (01 Dec 2017 13:50) (95% - 100%)  CAPILLARY BLOOD GLUCOSE          11-30 @ 07:01  -  12-01 @ 07:00  --------------------------------------------------------  IN: 500 mL / OUT: 1400 mL / NET: -900 mL    12-01 @ 07:01  -  12-01 @ 16:42  --------------------------------------------------------  IN: 200 mL / OUT: 950 mL / NET: -750 mL        Physical Exam:    Daily     General:  Well appearing, NAD, not cachetic  HEENT:  Nonicteric, PERRLA  CV:  RRR, no murmur, no JVD  Lungs: Decreased BS at bases.      Abdomen:  Soft, non-tender, no distended, positive BS, no hepatosplenomegaly  Extremities:  2+ pulses, no c/c, no edema  Skin:  Warm and dry, no rashes  :  No rodrigues  Neuro:  AAOx3, non-focal, CN II-XII grossly intact  No Restraints    LABS:                        9.4    6.9   )-----------( 240      ( 01 Dec 2017 15:21 )             29.5     12-01    142  |  103  |  23  ----------------------------<  144<H>  3.6   |  26  |  1.49<H>    Ca    8.9      01 Dec 2017 15:21  Phos  2.9     11-30  Mg     2.0     11-30              RADIOLOGY & ADDITIONAL TESTS:    ---------------------------------------------------------------------------  I personally reviewed: [  ]EKG   [  ]CXR    [  ] CT    [  ]Other  ---------------------------------------------------------------------------  PLEASE CHECK WHEN PRESENT:     [  ]Heart Failure     [  ] Acute     [  ] Acute on Chronic     [  ] Chronic  -------------------------------------------------------------------     [  ]Diastolic [HFpEF]     [  ]Systolic [HFrEF]     [  ]Combined [HFpEF & HFrEF]     [  ]Other:  -------------------------------------------------------------------  [  ]FABY     [  ]ATN     [  ]Reneal Medullary Necrosis     [  ]Renal Cortical Necrosis     [  ]Other Pathological Lesions:    [  ]CKD 1  [  ]CKD 2  [  ]CKD 3  [  ]CKD 4  [  ]CKD 5  [  ]Other  -------------------------------------------------------------------  [  ]Other/Unspecified:    --------------------------------------------------------------------    Abdominal Nutritional Status  [  ]Malnutrition: See Nutrition Note  [  ]Cachexia  [  ]Other:   [  ]Supplement Ordered:  [  ]Morbid Obesity (BMI >=40]

## 2017-12-01 NOTE — CONSULT NOTE ADULT - PROBLEM SELECTOR RECOMMENDATION 2
highly suspected, given patients body highly suspected, given patients body habitus will need ABG to confirm. Not highly suspected, given patients body habitus will need ABG to confirm.

## 2017-12-01 NOTE — PROGRESS NOTE ADULT - SUBJECTIVE AND OBJECTIVE BOX
S: No acute events overnight. Getting OOB w PT. No breathing difficulties    Vital Signs Last 24 Hrs  T(C): 37.1 (01 Dec 2017 04:40), Max: 37.1 (01 Dec 2017 04:40)  T(F): 98.8 (01 Dec 2017 04:40), Max: 98.8 (01 Dec 2017 04:40)  HR: 72 (01 Dec 2017 04:40) (72 - 89)  BP: 130/58 (01 Dec 2017 04:40) (125/56 - 158/68)  BP(mean): --  RR: 16 (01 Dec 2017 04:40) (16 - 117)  SpO2: 98% (01 Dec 2017 04:40) (94% - 98%)  I&O's Summary    29 Nov 2017 07:01  -  30 Nov 2017 07:00  --------------------------------------------------------  IN: 680 mL / OUT: 1870 mL / NET: -1190 mL    30 Nov 2017 07:01  -  01 Dec 2017 06:21  --------------------------------------------------------  IN: 500 mL / OUT: 1400 mL / NET: -900 mL        Prevena CDI  Motor: 5/5 GS/TA/EHL/FHL    Sensation: SILT sural, saph, DP, SP and tibial n distribution  Pulses: WWP, DP/PT 1+                            8.7    6.8   )-----------( 149      ( 30 Nov 2017 08:09 )             26.7     11-30    140  |  104  |  25<H>  ----------------------------<  108<H>  4.0   |  23  |  1.67<H>    Ca    8.3<L>      30 Nov 2017 08:09  Phos  2.9     11-30  Mg     2.0     11-30        MEDICATIONS  (STANDING):  aspirin enteric coated 325 milliGRAM(s) Oral two times a day  dextrose 5%. 1000 milliLiter(s) (50 mL/Hr) IV Continuous <Continuous>  dextrose 50% Injectable 12.5 Gram(s) IV Push once  dextrose 50% Injectable 25 Gram(s) IV Push once  dextrose 50% Injectable 25 Gram(s) IV Push once  lactobacillus acidophilus 1 Tablet(s) Oral daily  multivitamin 1 Tablet(s) Oral daily    MEDICATIONS  (PRN):  acetaminophen   Tablet. 650 milliGRAM(s) Oral every 6 hours PRN Mild Pain (1 - 3)  dextrose Gel 1 Dose(s) Oral once PRN Blood Glucose LESS THAN 70 milliGRAM(s)/deciliter  glucagon  Injectable 1 milliGRAM(s) IntraMuscular once PRN Glucose LESS THAN 70 milligrams/deciliter  ondansetron Injectable 4 milliGRAM(s) IV Push every 6 hours PRN Nausea and/or Vomiting      A/P:  84y Male s/p revision L JOANNA POD 4  -Stable  -Follow up intra-op cxs  -Cont Vanc and Zosyn for broad-spect coverage untilfinal cx results return (so far NGTD)  -Pain Control  -PT/WBAT  -DVT ppx:  BID  -f/u AM labs  -Dispo: Pending

## 2017-12-01 NOTE — PROGRESS NOTE ADULT - SUBJECTIVE AND OBJECTIVE BOX
ORTHO NOTE    [x] Pt seen/examined.  [ x] Pt without any complaints/in NAD.    [ ] Pt complains of:      ROS: [ ] Fever  [ ] Chills  [ ] CP [ ] SOB [x ] Dysnea  [ ] Palpitations [ ] Cough [ ] N/V/C/D [ ] Paresthia [ ] Other     [ ] ROS  otherwise negative    .    PHYSICAL EXAM:    Vital Signs Last 24 Hrs  T(C): 37 (01 Dec 2017 13:50), Max: 37.1 (01 Dec 2017 04:40)  T(F): 98.6 (01 Dec 2017 13:50), Max: 98.8 (01 Dec 2017 04:40)  HR: 81 (01 Dec 2017 13:50) (72 - 83)  BP: 129/59 (01 Dec 2017 13:50) (125/56 - 151/70)  BP(mean): --  RR: 15 (01 Dec 2017 13:50) (15 - 117)  SpO2: 100% (01 Dec 2017 13:50) (95% - 100%)    I&O's Detail    30 Nov 2017 07:01  -  01 Dec 2017 07:00  --------------------------------------------------------  IN:    Oral Fluid: 500 mL  Total IN: 500 mL    OUT:    Indwelling Catheter - Urethral: 1400 mL  Total OUT: 1400 mL    Total NET: -900 mL      01 Dec 2017 07:01  -  01 Dec 2017 17:05  --------------------------------------------------------  IN:    Oral Fluid: 200 mL  Total IN: 200 mL    OUT:    Indwelling Catheter - Urethral: 950 mL  Total OUT: 950 mL    Total NET: -750 mL           CAPILLARY BLOOD GLUCOSE                      Neuro: AAOx3, sitting up in bed     Lungs: auditory wheezing noted upon side to side movement in bed, crackles BL at bases upon auscultation, SP02 90-91% on RA, 98% SP02 on 2LNC    CV: HR currently in the 80s with runs of sinus tachycardia upon exertion       ABD: soft non tender    Ext: L posterior prevena plus to suction , LLE NVID, motor 5/5, abductor pillow implemented, SCD     LABS                        9.4    6.9   )-----------( 240      ( 01 Dec 2017 15:21 )             29.5                                12-01    142  |  103  |  23  ----------------------------<  144<H>  3.6   |  26  |  1.49<H>    Ca    8.9      01 Dec 2017 15:21  Phos  2.9     11-30  Mg     2.0     11-30        [ ] Other Labs  [ ] None ordered            Please check or Huslia when present:  •  Heart Failure:    [ ] Acute        [ ]  Acute on Chronic        [ ] Chronic         [ ] Diastolic     [ ]  Combined    •  FABY:     [ ] ATN        [ ]  Renal medullary necrosis       [ ]  Renal cortical necrosis                  [ ] Other pathological Lesion:  •  CKD:  [ ] Stage I   [ ] Stage II  [ ] Stage III    [ ]Stage IV   [ ]  CKD V   [ ]  Other/Unspecified:    •  Abdominal Nutritional Status:   [ ] Malnutrition-See Nutrition note    [ ] Cachexia   [ ]  Other        [ ] Supplement ordered:            [ ] Morbid Obesity: BMI >=40      ASSESSMENT/PLAN:      STATUS POST: L TKA POD #4   Dr Bragg c/s from medicine, reviewed CXR he does not think the pt has PNA, and wheezing/hypoxia is secondary to atelectasis  continue duonebs q4 standing order, hold off on antbx; lasix 40mg could not be given d/t elevated creat, trend labs   IS pushed, 00B encouraged    CONTINUE:          [ ] PT- WBAT, posterior hip precautions      [ ] DVT PPX- ASA     [ ] Pain Mgt- PO meds     [ ] Dispo plan- rehab

## 2017-12-01 NOTE — CONSULT NOTE ADULT - PROBLEM SELECTOR RECOMMENDATION 9
Etiology appears to be secondary to atelectasis and poor inspiratory effort.  On x ray no clear delineation of consolidation noted or pleural effusions Etiology appears to be secondary to atelectasis and poor inspiratory effort.  On x ray no clear delineation of consolidation noted or pleural effusions consistent with heart failure.  Will continue to encourage incentive spirometry, duonebs and OOB with assistance. Etiology appears to be secondary to atelectasis and poor inspiratory effort.  On x ray no clear delineation of consolidation noted or pleural effusions consistent with heart failure, however   Will continue to encourage incentive spirometry, duonebs and MIRACLE with assistance.

## 2017-12-02 LAB
ANION GAP SERPL CALC-SCNC: 9 MMOL/L — SIGNIFICANT CHANGE UP (ref 5–17)
BUN SERPL-MCNC: 21 MG/DL — SIGNIFICANT CHANGE UP (ref 7–23)
CALCIUM SERPL-MCNC: 8.7 MG/DL — SIGNIFICANT CHANGE UP (ref 8.4–10.5)
CHLORIDE SERPL-SCNC: 105 MMOL/L — SIGNIFICANT CHANGE UP (ref 96–108)
CO2 SERPL-SCNC: 25 MMOL/L — SIGNIFICANT CHANGE UP (ref 22–31)
CREAT SERPL-MCNC: 1.38 MG/DL — HIGH (ref 0.5–1.3)
GLUCOSE SERPL-MCNC: 103 MG/DL — HIGH (ref 70–99)
HCT VFR BLD CALC: 25 % — LOW (ref 39–50)
HGB BLD-MCNC: 8.1 G/DL — LOW (ref 13–17)
MCHC RBC-ENTMCNC: 28.5 PG — SIGNIFICANT CHANGE UP (ref 27–34)
MCHC RBC-ENTMCNC: 32.4 G/DL — SIGNIFICANT CHANGE UP (ref 32–36)
MCV RBC AUTO: 88 FL — SIGNIFICANT CHANGE UP (ref 80–100)
PLATELET # BLD AUTO: 202 K/UL — SIGNIFICANT CHANGE UP (ref 150–400)
POTASSIUM SERPL-MCNC: 4 MMOL/L — SIGNIFICANT CHANGE UP (ref 3.5–5.3)
POTASSIUM SERPL-SCNC: 4 MMOL/L — SIGNIFICANT CHANGE UP (ref 3.5–5.3)
RBC # BLD: 2.84 M/UL — LOW (ref 4.2–5.8)
RBC # FLD: 14.6 % — SIGNIFICANT CHANGE UP (ref 10.3–16.9)
SODIUM SERPL-SCNC: 139 MMOL/L — SIGNIFICANT CHANGE UP (ref 135–145)
WBC # BLD: 5.2 K/UL — SIGNIFICANT CHANGE UP (ref 3.8–10.5)
WBC # FLD AUTO: 5.2 K/UL — SIGNIFICANT CHANGE UP (ref 3.8–10.5)

## 2017-12-02 RX ADMIN — Medication 325 MILLIGRAM(S): at 17:29

## 2017-12-02 RX ADMIN — Medication 1 TABLET(S): at 09:12

## 2017-12-02 RX ADMIN — Medication 3 MILLILITER(S): at 08:05

## 2017-12-02 RX ADMIN — Medication 3 MILLILITER(S): at 17:29

## 2017-12-02 RX ADMIN — Medication 325 MILLIGRAM(S): at 08:05

## 2017-12-02 NOTE — PROGRESS NOTE ADULT - SUBJECTIVE AND OBJECTIVE BOX
S: No acute events overnight. Getting OOB w PT. No breathing difficulties    Vital Signs Last 24 Hrs  T(C): 37.6 (02 Dec 2017 05:22), Max: 37.6 (02 Dec 2017 05:22)  T(F): 99.6 (02 Dec 2017 05:22), Max: 99.6 (02 Dec 2017 05:22)  HR: 78 (02 Dec 2017 05:22) (78 - 84)  BP: 121/60 (02 Dec 2017 05:22) (121/60 - 151/70)  BP(mean): --  RR: 16 (02 Dec 2017 05:22) (14 - 16)  SpO2: 96% (02 Dec 2017 05:22) (95% - 100%)    I&O's Summary    29 Nov 2017 07:01  -  30 Nov 2017 07:00  --------------------------------------------------------  IN: 680 mL / OUT: 1870 mL / NET: -1190 mL    30 Nov 2017 07:01  -  01 Dec 2017 06:21  --------------------------------------------------------  IN: 500 mL / OUT: 1400 mL / NET: -900 mL        Prevena CDI  Motor: 5/5 GS/TA/EHL/FHL    Sensation: SILT sural, saph, DP, SP and tibial n distribution  Pulses: WWP, DP/PT 1+                                       9.4    6.9   )-----------( 240      ( 01 Dec 2017 15:21 )             29.5       11-30    140  |  104  |  25<H>  ----------------------------<  108<H>  4.0   |  23  |  1.67<H>    Ca    8.3<L>      30 Nov 2017 08:09  Phos  2.9     11-30  Mg     2.0     11-30      Culture - Tissue with Gram Stain (11.27.17 @ 22:03)    Gram Stain:   No organisms seen  Rare White blood cells    Specimen Source: .Tissue left hip sample 2    Culture Results:   No growth          MEDICATIONS  (STANDING):  aspirin enteric coated 325 milliGRAM(s) Oral two times a day  dextrose 5%. 1000 milliLiter(s) (50 mL/Hr) IV Continuous <Continuous>  dextrose 50% Injectable 12.5 Gram(s) IV Push once  dextrose 50% Injectable 25 Gram(s) IV Push once  dextrose 50% Injectable 25 Gram(s) IV Push once  lactobacillus acidophilus 1 Tablet(s) Oral daily  multivitamin 1 Tablet(s) Oral daily    MEDICATIONS  (PRN):  acetaminophen   Tablet. 650 milliGRAM(s) Oral every 6 hours PRN Mild Pain (1 - 3)  dextrose Gel 1 Dose(s) Oral once PRN Blood Glucose LESS THAN 70 milliGRAM(s)/deciliter  glucagon  Injectable 1 milliGRAM(s) IntraMuscular once PRN Glucose LESS THAN 70 milligrams/deciliter  ondansetron Injectable 4 milliGRAM(s) IV Push every 6 hours PRN Nausea and/or Vomiting      A/P:  84y Male s/p revision L JOANNA POD 5  -Stable  -Follow up intra-op cxs  -Cont Vanc and Zosyn for broad-spect coverage untilfinal cx results return (so far NGTD)  -Pain Control  -PT/WBAT  -DVT ppx:  BID  -f/u AM labs  -Dispo: [facility]

## 2017-12-03 LAB
C DIFF GDH STL QL: NEGATIVE — SIGNIFICANT CHANGE UP
C DIFF GDH STL QL: SIGNIFICANT CHANGE UP
GLUCOSE BLDC GLUCOMTR-MCNC: 108 MG/DL — HIGH (ref 70–99)

## 2017-12-03 RX ORDER — TRAMADOL HYDROCHLORIDE 50 MG/1
25 TABLET ORAL ONCE
Qty: 0 | Refills: 0 | Status: DISCONTINUED | OUTPATIENT
Start: 2017-12-03 | End: 2017-12-03

## 2017-12-03 RX ADMIN — Medication 325 MILLIGRAM(S): at 05:56

## 2017-12-03 RX ADMIN — Medication 325 MILLIGRAM(S): at 22:27

## 2017-12-03 RX ADMIN — Medication 3 MILLILITER(S): at 13:45

## 2017-12-03 RX ADMIN — Medication 1 TABLET(S): at 09:43

## 2017-12-03 RX ADMIN — TRAMADOL HYDROCHLORIDE 25 MILLIGRAM(S): 50 TABLET ORAL at 14:44

## 2017-12-03 RX ADMIN — Medication 3 MILLILITER(S): at 05:56

## 2017-12-03 RX ADMIN — TRAMADOL HYDROCHLORIDE 25 MILLIGRAM(S): 50 TABLET ORAL at 13:44

## 2017-12-03 NOTE — PROGRESS NOTE ADULT - SUBJECTIVE AND OBJECTIVE BOX
S: No acute events overnight. Getting OOB w PT. No breathing difficulties    Vital Signs Last 24 Hrs  T(C): 37.3 (03 Dec 2017 04:52), Max: 37.3 (03 Dec 2017 04:52)  T(F): 99.1 (03 Dec 2017 04:52), Max: 99.1 (03 Dec 2017 04:52)  HR: 86 (03 Dec 2017 04:52) (78 - 88)  BP: 143/65 (03 Dec 2017 04:52) (139/64 - 168/77)  BP(mean): --  RR: 16 (03 Dec 2017 04:52) (15 - 17)  SpO2: 100% (03 Dec 2017 04:52) (99% - 100%)    I&O's Summary    29 Nov 2017 07:01  -  30 Nov 2017 07:00  --------------------------------------------------------  IN: 680 mL / OUT: 1870 mL / NET: -1190 mL    30 Nov 2017 07:01  -  01 Dec 2017 06:21  --------------------------------------------------------  IN: 500 mL / OUT: 1400 mL / NET: -900 mL        Prevena CDI  Motor: 5/5 GS/TA/EHL/FHL    Sensation: SILT sural, saph, DP, SP and tibial n distribution  Pulses: WWP, DP/PT 1+                                       8.1    5.2   )-----------( 202      ( 02 Dec 2017 08:06 )             25.0       11-30    140  |  104  |  25<H>  ----------------------------<  108<H>  4.0   |  23  |  1.67<H>    Ca    8.3<L>      30 Nov 2017 08:09  Phos  2.9     11-30  Mg     2.0     11-30      Culture - Tissue with Gram Stain (11.27.17 @ 22:03)    Gram Stain:   No organisms seen  Rare White blood cells    Specimen Source: .Tissue left hip sample 2    Culture Results:   No growth          MEDICATIONS  (STANDING):  aspirin enteric coated 325 milliGRAM(s) Oral two times a day  dextrose 5%. 1000 milliLiter(s) (50 mL/Hr) IV Continuous <Continuous>  dextrose 50% Injectable 12.5 Gram(s) IV Push once  dextrose 50% Injectable 25 Gram(s) IV Push once  dextrose 50% Injectable 25 Gram(s) IV Push once  lactobacillus acidophilus 1 Tablet(s) Oral daily  multivitamin 1 Tablet(s) Oral daily    MEDICATIONS  (PRN):  acetaminophen   Tablet. 650 milliGRAM(s) Oral every 6 hours PRN Mild Pain (1 - 3)  dextrose Gel 1 Dose(s) Oral once PRN Blood Glucose LESS THAN 70 milliGRAM(s)/deciliter  glucagon  Injectable 1 milliGRAM(s) IntraMuscular once PRN Glucose LESS THAN 70 milligrams/deciliter  ondansetron Injectable 4 milliGRAM(s) IV Push every 6 hours PRN Nausea and/or Vomiting      A/P:  84y Male s/p revision L JOANNA POD 6  -Stable  -Pain Control  -PT/WBAT  -DVT ppx:  BID  -f/u AM labs  -Dispo: [facility]YL

## 2017-12-04 VITALS
TEMPERATURE: 98 F | DIASTOLIC BLOOD PRESSURE: 66 MMHG | RESPIRATION RATE: 17 BRPM | SYSTOLIC BLOOD PRESSURE: 117 MMHG | OXYGEN SATURATION: 93 % | HEART RATE: 85 BPM

## 2017-12-04 DIAGNOSIS — E66.09 OTHER OBESITY DUE TO EXCESS CALORIES: ICD-10-CM

## 2017-12-04 DIAGNOSIS — R19.7 DIARRHEA, UNSPECIFIED: ICD-10-CM

## 2017-12-04 DIAGNOSIS — N40.0 BENIGN PROSTATIC HYPERPLASIA WITHOUT LOWER URINARY TRACT SYMPTOMS: ICD-10-CM

## 2017-12-04 DIAGNOSIS — N18.3 CHRONIC KIDNEY DISEASE, STAGE 3 (MODERATE): ICD-10-CM

## 2017-12-04 LAB
HCT VFR BLD CALC: 27.5 % — LOW (ref 39–50)
HGB BLD-MCNC: 9 G/DL — LOW (ref 13–17)
MCHC RBC-ENTMCNC: 29.4 PG — SIGNIFICANT CHANGE UP (ref 27–34)
MCHC RBC-ENTMCNC: 32.7 G/DL — SIGNIFICANT CHANGE UP (ref 32–36)
MCV RBC AUTO: 89.9 FL — SIGNIFICANT CHANGE UP (ref 80–100)
PLATELET # BLD AUTO: 271 K/UL — SIGNIFICANT CHANGE UP (ref 150–400)
RBC # BLD: 3.06 M/UL — LOW (ref 4.2–5.8)
RBC # FLD: 14.6 % — SIGNIFICANT CHANGE UP (ref 10.3–16.9)
WBC # BLD: 5.6 K/UL — SIGNIFICANT CHANGE UP (ref 3.8–10.5)
WBC # FLD AUTO: 5.6 K/UL — SIGNIFICANT CHANGE UP (ref 3.8–10.5)

## 2017-12-04 PROCEDURE — 99232 SBSQ HOSP IP/OBS MODERATE 35: CPT | Mod: GC

## 2017-12-04 RX ORDER — IPRATROPIUM/ALBUTEROL SULFATE 18-103MCG
3 AEROSOL WITH ADAPTER (GRAM) INHALATION
Qty: 0 | Refills: 0 | DISCHARGE
Start: 2017-12-04

## 2017-12-04 RX ORDER — FLUTICASONE PROPIONATE 220 MCG
0 AEROSOL WITH ADAPTER (GRAM) INHALATION
Qty: 0 | Refills: 0 | COMMUNITY

## 2017-12-04 RX ORDER — LACTOBACILLUS ACIDOPHILUS 100MM CELL
0 CAPSULE ORAL
Qty: 0 | Refills: 0 | DISCHARGE
Start: 2017-12-04

## 2017-12-04 RX ORDER — ASPIRIN/CALCIUM CARB/MAGNESIUM 324 MG
1 TABLET ORAL
Qty: 0 | Refills: 0 | DISCHARGE
Start: 2017-12-04

## 2017-12-04 RX ORDER — TIOTROPIUM BROMIDE 18 UG/1
1 CAPSULE ORAL; RESPIRATORY (INHALATION)
Qty: 0 | Refills: 0 | DISCHARGE
Start: 2017-12-04

## 2017-12-04 RX ORDER — ACETAMINOPHEN 500 MG
2 TABLET ORAL
Qty: 0 | Refills: 0 | DISCHARGE
Start: 2017-12-04

## 2017-12-04 RX ADMIN — Medication 1 TABLET(S): at 11:30

## 2017-12-04 RX ADMIN — Medication 3 MILLILITER(S): at 01:19

## 2017-12-04 RX ADMIN — Medication 325 MILLIGRAM(S): at 11:30

## 2017-12-04 RX ADMIN — Medication 3 MILLILITER(S): at 11:30

## 2017-12-04 NOTE — PROGRESS NOTE ADULT - PROBLEM SELECTOR PLAN 1
Etiology appears to be secondary to poor inspiratory effort.  Chest xray with no signs of consolidation.    Continue incentive spirometry   Duonebs q4 standing

## 2017-12-04 NOTE — PROGRESS NOTE ADULT - PROBLEM SELECTOR PROBLEM 2
Class 2 obesity due to excess calories with body mass index (BMI) of 35.0 to 35.9 in adult, unspecified whether serious comorbidity present

## 2017-12-04 NOTE — PROGRESS NOTE ADULT - PROVIDER SPECIALTY LIST ADULT
Orthopedics
SICU
SICU
Hospitalist

## 2017-12-04 NOTE — PROGRESS NOTE ADULT - SUBJECTIVE AND OBJECTIVE BOX
Patient is a 84y old  Male who presents with a chief complaint of s/p infected left hip (28 Nov 2017 13:49)      INTERVAL HPI/OVERNIGHT EVENTS: No acute overnight events.      Review of Systems: 12 point review of systems otherwise negative  ( - )fevers/chills  ( - ) dyspnea  ( - ) cough  ( - ) chest pain  ( - ) palpatations  ( - ) dizziness/lightheadedness  ( - ) nausea/vomiting  ( - ) abd pain  ( - ) diarrhea  ( - ) melena  ( - ) hematochezia  ( - ) dysuria  ( - ) hematuria  ( - ) leg swelling  ( -) calf tenderness  ( - ) motor weakness  ( - ) extremity numbness  ( - ) back pain  ( + ) tolerating POs  ( + ) BM    MEDICATIONS  (STANDING):  ALBUTerol/ipratropium for Nebulization 3 milliLiter(s) Nebulizer every 6 hours  aspirin enteric coated 325 milliGRAM(s) Oral two times a day  dextrose 5%. 1000 milliLiter(s) (50 mL/Hr) IV Continuous <Continuous>  dextrose 50% Injectable 12.5 Gram(s) IV Push once  dextrose 50% Injectable 25 Gram(s) IV Push once  dextrose 50% Injectable 25 Gram(s) IV Push once  lactobacillus acidophilus 1 Tablet(s) Oral daily  multivitamin 1 Tablet(s) Oral daily  tiotropium 18 MICROgram(s) Capsule 1 Capsule(s) Inhalation daily    MEDICATIONS  (PRN):  acetaminophen   Tablet. 650 milliGRAM(s) Oral every 6 hours PRN Mild Pain (1 - 3)  dextrose Gel 1 Dose(s) Oral once PRN Blood Glucose LESS THAN 70 milliGRAM(s)/deciliter  glucagon  Injectable 1 milliGRAM(s) IntraMuscular once PRN Glucose LESS THAN 70 milligrams/deciliter  ondansetron Injectable 4 milliGRAM(s) IV Push every 6 hours PRN Nausea and/or Vomiting      Allergies    No Known Allergies    Intolerances          Vital Signs Last 24 Hrs  T(C): 36.7 (04 Dec 2017 09:16), Max: 37.3 (04 Dec 2017 01:35)  T(F): 98.1 (04 Dec 2017 09:16), Max: 99.1 (04 Dec 2017 01:35)  HR: 87 (04 Dec 2017 09:16) (79 - 100)  BP: 163/72 (04 Dec 2017 09:16) (118/58 - 163/72)  BP(mean): --  RR: 17 (04 Dec 2017 09:16) (15 - 17)  SpO2: 97% (04 Dec 2017 09:16) (97% - 100%)  CAPILLARY BLOOD GLUCOSE          12-03 @ 07:01  -  12-04 @ 07:00  --------------------------------------------------------  IN: 750 mL / OUT: 0 mL / NET: 750 mL    12-04 @ 07:01  -  12-04 @ 09:51  --------------------------------------------------------  IN: 100 mL / OUT: 0 mL / NET: 100 mL        Physical Exam:    Daily     Daily   General:  Well appearing, NAD, not cachetic  HEENT:  Nonicteric, PERRLA  CV:  RRR, no murmur, no JVD  Lungs:  CTA B/L, slight expiratory wheezes   Abdomen:  Soft, non-tender, no distended, positive BS, no hepatosplenomegaly  Extremities:  2+ pulses, no c/c, no edema  Skin:  Warm and dry, no rashes  :  No rodrigues  Neuro:  AAOx3, non-focal, CN II-XII grossly intact  No Restraints    LABS:                        9.0    5.6   )-----------( 271      ( 04 Dec 2017 07:23 )             27.5                   RADIOLOGY & ADDITIONAL TESTS:    ---------------------------------------------------------------------------  I personally reviewed: [  ]EKG   [  ]CXR    [  ] CT    [  ]Other  ---------------------------------------------------------------------------  PLEASE CHECK WHEN PRESENT:     [  ]Heart Failure     [  ] Acute     [  ] Acute on Chronic     [  ] Chronic  -------------------------------------------------------------------     [  ]Diastolic [HFpEF]     [  ]Systolic [HFrEF]     [  ]Combined [HFpEF & HFrEF]     [  ]Other:  -------------------------------------------------------------------  [  ]FABY     [  ]ATN     [  ]Reneal Medullary Necrosis     [  ]Renal Cortical Necrosis     [  ]Other Pathological Lesions:    [  ]CKD 1  [  ]CKD 2  [  ]CKD 3  [  ]CKD 4  [  ]CKD 5  [  ]Other  -------------------------------------------------------------------  [  ]Other/Unspecified:    --------------------------------------------------------------------    Abdominal Nutritional Status  [  ]Malnutrition: See Nutrition Note  [  ]Cachexia  [  ]Other:   [  ]Supplement Ordered:  [  ]Morbid Obesity (BMI >=40]

## 2017-12-04 NOTE — PROGRESS NOTE ADULT - SUBJECTIVE AND OBJECTIVE BOX
S: No acute events overnight.    Vital Signs Last 24 Hrs  T(C): 37.3 (04 Dec 2017 01:35), Max: 37.3 (04 Dec 2017 01:35)  T(F): 99.1 (04 Dec 2017 01:35), Max: 99.1 (04 Dec 2017 01:35)  HR: 79 (04 Dec 2017 01:35) (79 - 100)  BP: 118/58 (04 Dec 2017 01:35) (118/58 - 149/67)  BP(mean): --  RR: 15 (04 Dec 2017 01:35) (15 - 17)  SpO2: 100% (04 Dec 2017 01:35) (100% - 100%)  I&O's Summary    02 Dec 2017 07:01  -  03 Dec 2017 07:00  --------------------------------------------------------  IN: 360 mL / OUT: 875 mL / NET: -515 mL    03 Dec 2017 07:01  -  04 Dec 2017 05:50  --------------------------------------------------------  IN: 750 mL / OUT: 0 mL / NET: 750 mL        Prevena L hip CDI  Motor: 5/5 GS/TA/EHL/FHL    Sensation: SILT sural, saph, DP, SP and tibial n distribution  Pulses: WWP, DP/PT 1+                            8.1    5.2   )-----------( 202      ( 02 Dec 2017 08:06 )             25.0     12-02    139  |  105  |  21  ----------------------------<  103<H>  4.0   |  25  |  1.38<H>    Ca    8.7      02 Dec 2017 08:06        MEDICATIONS  (STANDING):  ALBUTerol/ipratropium for Nebulization 3 milliLiter(s) Nebulizer every 6 hours  aspirin enteric coated 325 milliGRAM(s) Oral two times a day  dextrose 5%. 1000 milliLiter(s) (50 mL/Hr) IV Continuous <Continuous>  dextrose 50% Injectable 12.5 Gram(s) IV Push once  dextrose 50% Injectable 25 Gram(s) IV Push once  dextrose 50% Injectable 25 Gram(s) IV Push once  lactobacillus acidophilus 1 Tablet(s) Oral daily  multivitamin 1 Tablet(s) Oral daily  tiotropium 18 MICROgram(s) Capsule 1 Capsule(s) Inhalation daily    MEDICATIONS  (PRN):  acetaminophen   Tablet. 650 milliGRAM(s) Oral every 6 hours PRN Mild Pain (1 - 3)  dextrose Gel 1 Dose(s) Oral once PRN Blood Glucose LESS THAN 70 milliGRAM(s)/deciliter  glucagon  Injectable 1 milliGRAM(s) IntraMuscular once PRN Glucose LESS THAN 70 milligrams/deciliter  ondansetron Injectable 4 milliGRAM(s) IV Push every 6 hours PRN Nausea and/or Vomiting      A/P:  84y Male s/p revision L JOANNA POD 7  -Stable  -Intra-op cxs negative   -Pain Control  -PT/WBAT  -DVT ppx:  BID  -f/u AM labs  -Dispo: [facility]YL

## 2017-12-04 NOTE — PROGRESS NOTE ADULT - ASSESSMENT
84y Male s/p Revision Left THR    Neuro: tylenol prn, percocet prn  CV: HD stable. MAP goal 65-70,  BID  Pulm: RA  GI/FEN: Regular diet, Protonix, Zofran  : Carmen  ID: Vanc/Zosyn (11/27--). Await final cultures  Endo: ISS  Heme: s/p 2 u pRBC,  BID  PPX: SCDs, Protonix,  Lines: FLORIDALMA Gill (11/27--), PIV, PICC  Wound: provena vac
84y Male s/p Revision of Left THR    Neuro: Off sedation, Fentanyl  CV: MAP goal 65-70,  BID  Pulm: intubated AC 40/440/10/5. CPAP trial. Extubate  GI/FEN: NPO, NS@100, Protonix, Zofran. Advance diet after extubation  : Carmen  ID: Vanc/Zosyn (11/27--)  Endo: ISS  Heme: s/p 2 u pRBC,  BID  PPX: SCDs, Protonix, Peridex  Lines: R Bridget (11/27--), PIV, PICC  Wound: provena vac
84 year old M w/

## 2017-12-06 DIAGNOSIS — T84.52XA INFECTION AND INFLAMMATORY REACTION DUE TO INTERNAL LEFT HIP PROSTHESIS, INITIAL ENCOUNTER: ICD-10-CM

## 2017-12-06 DIAGNOSIS — N40.0 BENIGN PROSTATIC HYPERPLASIA WITHOUT LOWER URINARY TRACT SYMPTOMS: ICD-10-CM

## 2017-12-06 DIAGNOSIS — E66.2 MORBID (SEVERE) OBESITY WITH ALVEOLAR HYPOVENTILATION: ICD-10-CM

## 2017-12-06 DIAGNOSIS — R09.02 HYPOXEMIA: ICD-10-CM

## 2017-12-06 DIAGNOSIS — J30.2 OTHER SEASONAL ALLERGIC RHINITIS: ICD-10-CM

## 2017-12-06 DIAGNOSIS — N18.3 CHRONIC KIDNEY DISEASE, STAGE 3 (MODERATE): ICD-10-CM

## 2017-12-06 DIAGNOSIS — Z85.46 PERSONAL HISTORY OF MALIGNANT NEOPLASM OF PROSTATE: ICD-10-CM
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
## 2017-12-06 DIAGNOSIS — J98.11 ATELECTASIS: ICD-10-CM

## 2017-12-06 DIAGNOSIS — I95.9 HYPOTENSION, UNSPECIFIED: ICD-10-CM

## 2017-12-06 DIAGNOSIS — Z92.3 PERSONAL HISTORY OF IRRADIATION: ICD-10-CM

## 2017-12-06 DIAGNOSIS — M19.90 UNSPECIFIED OSTEOARTHRITIS, UNSPECIFIED SITE: ICD-10-CM

## 2017-12-06 DIAGNOSIS — H91.90 UNSPECIFIED HEARING LOSS, UNSPECIFIED EAR: ICD-10-CM

## 2017-12-06 DIAGNOSIS — R19.7 DIARRHEA, UNSPECIFIED: ICD-10-CM

## 2017-12-06 DIAGNOSIS — Z78.1 PHYSICAL RESTRAINT STATUS: ICD-10-CM

## 2017-12-06 DIAGNOSIS — M25.452 EFFUSION, LEFT HIP: ICD-10-CM

## 2017-12-06 LAB — SURGICAL PATHOLOGY STUDY: SIGNIFICANT CHANGE UP

## 2017-12-19 PROCEDURE — P9045: CPT

## 2017-12-19 PROCEDURE — P9016: CPT

## 2017-12-19 PROCEDURE — 97162 PT EVAL MOD COMPLEX 30 MIN: CPT

## 2017-12-19 PROCEDURE — 94002 VENT MGMT INPAT INIT DAY: CPT

## 2017-12-19 PROCEDURE — 85018 HEMOGLOBIN: CPT

## 2017-12-19 PROCEDURE — 73501 X-RAY EXAM HIP UNI 1 VIEW: CPT

## 2017-12-19 PROCEDURE — 88300 SURGICAL PATH GROSS: CPT

## 2017-12-19 PROCEDURE — 84100 ASSAY OF PHOSPHORUS: CPT

## 2017-12-19 PROCEDURE — 71045 X-RAY EXAM CHEST 1 VIEW: CPT

## 2017-12-19 PROCEDURE — 83735 ASSAY OF MAGNESIUM: CPT

## 2017-12-19 PROCEDURE — 97161 PT EVAL LOW COMPLEX 20 MIN: CPT

## 2017-12-19 PROCEDURE — 86901 BLOOD TYPING SEROLOGIC RH(D): CPT

## 2017-12-19 PROCEDURE — 80053 COMPREHEN METABOLIC PANEL: CPT

## 2017-12-19 PROCEDURE — 82962 GLUCOSE BLOOD TEST: CPT

## 2017-12-19 PROCEDURE — 86923 COMPATIBILITY TEST ELECTRIC: CPT

## 2017-12-19 PROCEDURE — 72170 X-RAY EXAM OF PELVIS: CPT

## 2017-12-19 PROCEDURE — 93005 ELECTROCARDIOGRAM TRACING: CPT

## 2017-12-19 PROCEDURE — 87070 CULTURE OTHR SPECIMN AEROBIC: CPT

## 2017-12-19 PROCEDURE — 84132 ASSAY OF SERUM POTASSIUM: CPT

## 2017-12-19 PROCEDURE — 87449 NOS EACH ORGANISM AG IA: CPT

## 2017-12-19 PROCEDURE — 87075 CULTR BACTERIA EXCEPT BLOOD: CPT

## 2017-12-19 PROCEDURE — 83605 ASSAY OF LACTIC ACID: CPT

## 2017-12-19 PROCEDURE — C1776: CPT

## 2017-12-19 PROCEDURE — 86704 HEP B CORE ANTIBODY TOTAL: CPT

## 2017-12-19 PROCEDURE — 86850 RBC ANTIBODY SCREEN: CPT

## 2017-12-19 PROCEDURE — 80202 ASSAY OF VANCOMYCIN: CPT

## 2017-12-19 PROCEDURE — 97116 GAIT TRAINING THERAPY: CPT

## 2017-12-19 PROCEDURE — C1713: CPT

## 2017-12-19 PROCEDURE — 80048 BASIC METABOLIC PNL TOTAL CA: CPT

## 2017-12-19 PROCEDURE — 82803 BLOOD GASES ANY COMBINATION: CPT

## 2017-12-19 PROCEDURE — 84295 ASSAY OF SERUM SODIUM: CPT

## 2017-12-19 PROCEDURE — 85610 PROTHROMBIN TIME: CPT

## 2017-12-19 PROCEDURE — 97530 THERAPEUTIC ACTIVITIES: CPT

## 2017-12-19 PROCEDURE — C1889: CPT

## 2017-12-19 PROCEDURE — 94640 AIRWAY INHALATION TREATMENT: CPT

## 2017-12-19 PROCEDURE — 87324 CLOSTRIDIUM AG IA: CPT

## 2017-12-19 PROCEDURE — 86900 BLOOD TYPING SEROLOGIC ABO: CPT

## 2017-12-19 PROCEDURE — 86803 HEPATITIS C AB TEST: CPT

## 2017-12-19 PROCEDURE — 83036 HEMOGLOBIN GLYCOSYLATED A1C: CPT

## 2017-12-19 PROCEDURE — 87389 HIV-1 AG W/HIV-1&-2 AB AG IA: CPT

## 2017-12-19 PROCEDURE — 85027 COMPLETE CBC AUTOMATED: CPT

## 2017-12-19 PROCEDURE — 85730 THROMBOPLASTIN TIME PARTIAL: CPT

## 2017-12-19 PROCEDURE — 82330 ASSAY OF CALCIUM: CPT

## 2017-12-19 PROCEDURE — 36430 TRANSFUSION BLD/BLD COMPNT: CPT

## 2017-12-19 PROCEDURE — 36415 COLL VENOUS BLD VENIPUNCTURE: CPT

## 2017-12-26 NOTE — DIETITIAN INITIAL EVALUATION ADULT. - ORAL INTAKE PTA
PM&R Consult Progress Note      Patient: Miguel Forte  Admit Date: 12/23/2017  Admit Diagnosis: BULGING DISC;Cauda equina syndrome with neurogenic bladder *  Recommendations: Continue Acute Rehab Program, Coordination of rehab/medical care, Counseling of PM & R care issues management, [id number] pending insurance approval. Did well with therapy today. Bilateral foot drop unchanged; will wait until IRC to determine if a solid AFO or hinged or dorsi assist brace will be needed. Would like to see if she starts getting some dorsiflexion back before ordering solid AFOs  - bowel program, muniz for now; suspect neurogenic b/b  -excellent rehab potential  History/Subjective/Complaint:     Patient seen and examined. Records reviewed. In good spirits, sitting up in chair. Pain is minimal in her back.  Having cramping in the left calf and right hamstrings    Pain 1  Pain Scale 1: Numeric (0 - 10) (12/26/17 0908)  Pain Intensity 1: 4 (12/26/17 0908)  Patient Stated Pain Goal: 2 (12/25/17 2103)  Pain Reassessment 1: Yes (12/25/17 2200)  Pain Onset 1: post op  (12/26/17 0908)  Pain Location 1: Back (12/26/17 0908)  Pain Orientation 1: Lower (12/26/17 0908)  Pain Description 1: Aching (12/26/17 0908)  Pain Intervention(s) 1: Medication (see MAR) (12/26/17 0908)     Objective:     Vitals:  Patient Vitals for the past 8 hrs:   BP Temp Pulse Resp SpO2   12/26/17 0738 102/64 98.7 °F (37.1 °C) 79 20 97 %      Intake and Output:  12/24 1901 - 12/26 0700  In: 960 [P.O.:960]  Out: 3100 [Urine:3100]    Allergies   Allergen Reactions    Doxycycline Nausea and Vomiting    Pcn [Penicillins] Rash    Percocet [Oxycodone-Acetaminophen] Nausea and Vomiting     Current Facility-Administered Medications   Medication Dose Route Frequency    magnesium hydroxide (MILK OF MAGNESIA) 400 mg/5 mL oral suspension 30 mL  30 mL Oral DAILY PRN    polyethylene glycol (MIRALAX) packet 17 g  17 g Oral DAILY    bisacodyl (DULCOLAX) suppository 10 mg  10 mg Rectal DAILY PRN    dextrose 5% - 0.45% NaCl with KCl 20 mEq/L infusion  75 mL/hr IntraVENous CONTINUOUS    sodium chloride (NS) flush 5-10 mL  5-10 mL IntraVENous Q8H    sodium chloride (NS) flush 5-10 mL  5-10 mL IntraVENous PRN    acetaminophen (TYLENOL) tablet 650 mg  650 mg Oral Q4H PRN    HYDROcodone-acetaminophen (NORCO)  mg tablet 1 Tab  1 Tab Oral Q4H PRN    morphine injection 10 mg  10 mg IntraMUSCular Q4H PRN    naloxone (NARCAN) injection 0.4 mg  0.4 mg IntraVENous PRN    ondansetron (ZOFRAN) injection 4 mg  4 mg IntraVENous Q4H PRN    famotidine (PEPCID) tablet 20 mg  20 mg Oral Q12H    senna-docusate (PERICOLACE) 8.6-50 mg per tablet 2 Tab  2 Tab Oral DAILY    heparin (porcine) injection 5,000 Units  5,000 Units SubCUTAneous Q12H    cloNIDine HCl (CATAPRES) tablet 0.1 mg  0.1 mg Oral Q4H PRN    DULoxetine (CYMBALTA) capsule 60 mg  60 mg Oral QHS       Physical Exam:  No significant changes  Hip flexion 3+, KE 3+, DF 0, PF 4 on the right; on the left, hip flexion 3, KE3, DF 0, PF 4  Dec sens in saddle distribution. Subjective numbness L>R  1+ edema, no calf tenderness, neg Zarina's    Incision(s)/Wound(s): Wound Back Mid;Lower (Active)   DRESSING STATUS Clean, dry, and intact; Changed per order 12/26/2017  9:06 AM   DRESSING TYPE Adhesive wound dressing (Band-Aid) 12/26/2017  9:06 AM   Incision site well approximated?  Yes 12/26/2017  9:06 AM   Drainage Amount  None 12/26/2017  9:06 AM   Wound Odor None 12/25/2017  5:58 AM   Periwound Skin Condition Intact 12/25/2017  5:58 AM   Dressing Type Applied Adhesive wound dressing (Band-Aid) 12/25/2017  5:58 AM   Number of days:3              Functional Assessment:  Gross Assessment  AROM: Grossly decreased, non-functional (12/24/17 1100)  PROM: Within functional limits (12/24/17 1100)  Strength: Grossly decreased, non-functional (12/24/17 1100)  Coordination: Grossly decreased, non-functional (12/24/17 1100)  Sensation: Impaired (12/24/17 1100)     Gait  Base of Support: Center of gravity altered; Widened (12/25/17 1100)  Speed/Lata: Slow;Shuffled;Pace decreased (<100 feet/min) (12/25/17 1100)  Step Length: Left shortened;Right shortened (12/25/17 1100)  Gait Abnormalities: Decreased step clearance; Path deviations;Trunk sway increased; Foot drop (12/25/17 1100)  Ambulation - Level of Assistance: Minimal assistance (12/25/17 1100)  Distance (ft): 50 Feet (ft) (12/25/17 1100)  Assistive Device: Walker, rolling (12/25/17 1100)  Interventions: Safety awareness training;Verbal cues; Visual/Demos;Manual cues (12/25/17 1100)     Bed Mobility  Rolling: Stand-by asssistance (12/24/17 1600)  Supine to Sit: Stand-by asssistance (12/24/17 1600)  Sit to Supine: Moderate assistance (12/24/17 1100)  Scooting: Stand-by asssistance (12/24/17 1600)     Balance  Sitting: Intact (12/25/17 1100)  Standing: Impaired (12/25/17 1100)  Standing - Static: Fair (12/25/17 1100)  Standing - Dynamic : Fair (12/25/17 1100)                       Bed/Mat Mobility  Rolling: Stand-by asssistance (12/24/17 1600)  Supine to Sit: Stand-by asssistance (12/24/17 1600)  Sit to Supine:  Moderate assistance (12/24/17 1100)  Sit to Stand: Contact guard assistance (12/25/17 1100)  Bed to Chair: Minimum assistance (12/24/17 1600)  Scooting: Stand-by asssistance (12/24/17 1600)     Labs/Studies:  Recent Results (from the past 72 hour(s))   HCG URINE, QL. - POC    Collection Time: 12/23/17  3:37 PM   Result Value Ref Range    Pregnancy test,urine (POC) NEGATIVE  NEG     PLEASE READ & DOCUMENT PPD TEST IN 24 HRS    Collection Time: 12/25/17  5:44 PM   Result Value Ref Range    PPD neg Negative    mm Induration 0 mm   PLEASE READ & DOCUMENT PPD TEST IN 48 HRS    Collection Time: 12/25/17  7:04 PM   Result Value Ref Range    PPD  Negative    mm Induration  mm        Assessment:     Active Problems:    Cauda equina syndrome with neurogenic bladder ([address] (12/23/2017)        Plan:     Recommendations: Continue Acute Rehab Program  Coordination of rehab/medical care  Counseling of PM & R care issues management  Monitoring and management of medical conditions per plan of care/orders  Discussion with Family/Caregiver/Staff  Reviewed Therapies/Labs/Medications/Records    Signed By:  Janes Grier MD     December 26, 2017 likely adequate

## 2018-06-06 NOTE — PATIENT PROFILE ADULT. - SURGERY TIME
Chief Complaint   Patient presents with    Anxiety    Weight Loss     1. Have you been to the ER, urgent care clinic since your last visit? Hospitalized since your last visit? No    2. Have you seen or consulted any other health care providers outside of the [address] [place] [address] since your last visit? Include any pap smears or colon screening. Yes Where: Colonoscopy done on Monday and  May 22 and surgery (June 27th)   Eye doctor seen May 10,2018. 07:30

## 2019-02-18 NOTE — PRE-OP CHECKLIST - HAND OFF
Patient is in the supine position.   The body was positioned using the following devices: safety strap and gel pad mattress.  The head was positioned using the following devices: regular pillow.  The left arm was positioned using the following devices: arm board and gel arm pads.  The right arm was positioned using the following devices: arm board and gel arm pads.   yes

## 2020-02-14 NOTE — H&P ADULT - NSHPSOCIALHISTORY_GEN_ALL_CORE
Denies smoking, alcohol or drug use. [Dyspnea] : dyspnea [GERD] : gerd [Fever] : no fever [Chills] : no chills [Dry Eyes] : no dry eyes [Eye Irritation] : no eye irritation [Postnasal Drip] : no postnasal drip [Nasal Congestion] : no nasal congestion [Cough] : no cough [Sputum] : no sputum [Chest Discomfort] : no chest discomfort [Pleuritic Pain] : no pleuritic pain [Orthopnea] : no orthopnea [Hay Fever] : no hay fever [Nasal Discharge] : no nasal discharge [Abdominal Pain] : no abdominal pain [[id number]] : rest of ROS negative [Diarrhea] : no diarrhea

## 2021-08-02 NOTE — OCCUPATIONAL THERAPY INITIAL EVALUATION ADULT - ASSISTIVE DEVICE FOR TRANSFER: SIT/STAND, REHAB EVAL
Anesthesia Evaluation     Patient summary reviewed and Nursing notes reviewed   NPO Solid Status: > 8 hours  NPO Liquid Status: > 8 hours           Airway   Mallampati: II  Neck ROM: full  No difficulty expected  Dental - normal exam     Pulmonary     breath sounds clear to auscultation  (+) shortness of breath,   Cardiovascular     Rhythm: regular    (+) past MI , CAD, CABG, dysrhythmias Atrial Fib, angina, CHF , PVD, hyperlipidemia,       Neuro/Psych  GI/Hepatic/Renal/Endo    (+)  GERD,      Musculoskeletal     Abdominal   (+) obese,    Substance History      OB/GYN          Other   arthritis,    history of cancer                    Anesthesia Plan    ASA 3     general     intravenous induction     Anesthetic plan, all risks, benefits, and alternatives have been provided, discussed and informed consent has been obtained with: patient.      
rolling walker

## 2022-07-29 NOTE — H&P PST ADULT - CONSTITUTIONAL DETAILS
[de-identified] : 29 obese

## 2023-01-15 NOTE — PATIENT PROFILE ADULT. - PRESSURE ULCER(S)
FAMILY HISTORY:  Father  Still living? No  FH: CVA (cerebrovascular accident), Age at diagnosis: Age Unknown    Mother  Still living? No  FH: CVA (cerebrovascular accident), Age at diagnosis: Age Unknown    Sibling  Still living? Yes, Estimated age: Age Unknown  FH: ovarian cancer, Age at diagnosis: Age Unknown  FH: thyroid cancer, Age at diagnosis: Age Unknown    Uncle  Still living? Unknown  FH: esophageal cancer, Age at diagnosis: Age Unknown     no

## 2023-06-25 ENCOUNTER — INPATIENT (INPATIENT)
Facility: HOSPITAL | Age: 88
LOS: 8 days | Discharge: NURSING, REHAB OR LONG-TERM CARE | End: 2023-07-04
Attending: STUDENT IN AN ORGANIZED HEALTH CARE EDUCATION/TRAINING PROGRAM | Admitting: STUDENT IN AN ORGANIZED HEALTH CARE EDUCATION/TRAINING PROGRAM
Payer: MEDICARE

## 2023-06-25 VITALS
RESPIRATION RATE: 18 BRPM | TEMPERATURE: 98 F | HEIGHT: 66 IN | SYSTOLIC BLOOD PRESSURE: 110 MMHG | WEIGHT: 199.96 LBS | HEART RATE: 84 BPM | DIASTOLIC BLOOD PRESSURE: 64 MMHG | OXYGEN SATURATION: 99 %

## 2023-06-25 DIAGNOSIS — Z79.82 LONG TERM (CURRENT) USE OF ASPIRIN: ICD-10-CM

## 2023-06-25 DIAGNOSIS — Z96.642 PRESENCE OF LEFT ARTIFICIAL HIP JOINT: Chronic | ICD-10-CM

## 2023-06-25 DIAGNOSIS — Z96.642 PRESENCE OF LEFT ARTIFICIAL HIP JOINT: ICD-10-CM

## 2023-06-25 DIAGNOSIS — E78.5 HYPERLIPIDEMIA, UNSPECIFIED: ICD-10-CM

## 2023-06-25 DIAGNOSIS — Z96.1 PRESENCE OF INTRAOCULAR LENS: ICD-10-CM

## 2023-06-25 DIAGNOSIS — I50.33 ACUTE ON CHRONIC DIASTOLIC (CONGESTIVE) HEART FAILURE: ICD-10-CM

## 2023-06-25 DIAGNOSIS — J96.01 ACUTE RESPIRATORY FAILURE WITH HYPOXIA: ICD-10-CM

## 2023-06-25 DIAGNOSIS — Z90.49 ACQUIRED ABSENCE OF OTHER SPECIFIED PARTS OF DIGESTIVE TRACT: Chronic | ICD-10-CM

## 2023-06-25 DIAGNOSIS — M19.90 UNSPECIFIED OSTEOARTHRITIS, UNSPECIFIED SITE: ICD-10-CM

## 2023-06-25 DIAGNOSIS — H57.9 UNSPECIFIED DISORDER OF EYE AND ADNEXA: Chronic | ICD-10-CM

## 2023-06-25 DIAGNOSIS — N18.30 CHRONIC KIDNEY DISEASE, STAGE 3 UNSPECIFIED: ICD-10-CM

## 2023-06-25 DIAGNOSIS — Z85.46 PERSONAL HISTORY OF MALIGNANT NEOPLASM OF PROSTATE: ICD-10-CM

## 2023-06-25 DIAGNOSIS — I13.0 HYPERTENSIVE HEART AND CHRONIC KIDNEY DISEASE WITH HEART FAILURE AND STAGE 1 THROUGH STAGE 4 CHRONIC KIDNEY DISEASE, OR UNSPECIFIED CHRONIC KIDNEY DISEASE: ICD-10-CM

## 2023-06-25 DIAGNOSIS — Z92.3 PERSONAL HISTORY OF IRRADIATION: ICD-10-CM

## 2023-06-25 DIAGNOSIS — E87.6 HYPOKALEMIA: ICD-10-CM

## 2023-06-25 DIAGNOSIS — R91.8 OTHER NONSPECIFIC ABNORMAL FINDING OF LUNG FIELD: ICD-10-CM

## 2023-06-25 DIAGNOSIS — H91.90 UNSPECIFIED HEARING LOSS, UNSPECIFIED EAR: ICD-10-CM

## 2023-06-25 DIAGNOSIS — Z90.49 ACQUIRED ABSENCE OF OTHER SPECIFIED PARTS OF DIGESTIVE TRACT: ICD-10-CM

## 2023-06-25 DIAGNOSIS — Z98.890 OTHER SPECIFIED POSTPROCEDURAL STATES: Chronic | ICD-10-CM

## 2023-06-25 LAB
ALBUMIN SERPL ELPH-MCNC: 3 G/DL — LOW (ref 3.3–5)
ALP SERPL-CCNC: 92 U/L — SIGNIFICANT CHANGE UP (ref 40–120)
ALT FLD-CCNC: 16 U/L — SIGNIFICANT CHANGE UP (ref 12–78)
ANION GAP SERPL CALC-SCNC: 8 MMOL/L — SIGNIFICANT CHANGE UP (ref 5–17)
AST SERPL-CCNC: 21 U/L — SIGNIFICANT CHANGE UP (ref 15–37)
BASOPHILS # BLD AUTO: 0.02 K/UL — SIGNIFICANT CHANGE UP (ref 0–0.2)
BASOPHILS NFR BLD AUTO: 0.2 % — SIGNIFICANT CHANGE UP (ref 0–2)
BILIRUB SERPL-MCNC: 1 MG/DL — SIGNIFICANT CHANGE UP (ref 0.2–1.2)
BUN SERPL-MCNC: 42 MG/DL — HIGH (ref 7–23)
CALCIUM SERPL-MCNC: 8.6 MG/DL — SIGNIFICANT CHANGE UP (ref 8.5–10.1)
CHLORIDE SERPL-SCNC: 92 MMOL/L — LOW (ref 96–108)
CO2 SERPL-SCNC: 36 MMOL/L — HIGH (ref 22–31)
CREAT SERPL-MCNC: 2 MG/DL — HIGH (ref 0.5–1.3)
D DIMER BLD IA.RAPID-MCNC: HIGH NG/ML DDU
EGFR: 31 ML/MIN/1.73M2 — LOW
EOSINOPHIL # BLD AUTO: 0 K/UL — SIGNIFICANT CHANGE UP (ref 0–0.5)
EOSINOPHIL NFR BLD AUTO: 0 % — SIGNIFICANT CHANGE UP (ref 0–6)
FLUAV AG NPH QL: SIGNIFICANT CHANGE UP
FLUBV AG NPH QL: SIGNIFICANT CHANGE UP
GLUCOSE SERPL-MCNC: 131 MG/DL — HIGH (ref 70–99)
HCT VFR BLD CALC: 42.7 % — SIGNIFICANT CHANGE UP (ref 39–50)
HGB BLD-MCNC: 13.9 G/DL — SIGNIFICANT CHANGE UP (ref 13–17)
IMM GRANULOCYTES NFR BLD AUTO: 0.6 % — SIGNIFICANT CHANGE UP (ref 0–0.9)
LYMPHOCYTES # BLD AUTO: 0.44 K/UL — LOW (ref 1–3.3)
LYMPHOCYTES # BLD AUTO: 4.8 % — LOW (ref 13–44)
MCHC RBC-ENTMCNC: 30.1 PG — SIGNIFICANT CHANGE UP (ref 27–34)
MCHC RBC-ENTMCNC: 32.6 G/DL — SIGNIFICANT CHANGE UP (ref 32–36)
MCV RBC AUTO: 92.4 FL — SIGNIFICANT CHANGE UP (ref 80–100)
MONOCYTES # BLD AUTO: 0.6 K/UL — SIGNIFICANT CHANGE UP (ref 0–0.9)
MONOCYTES NFR BLD AUTO: 6.6 % — SIGNIFICANT CHANGE UP (ref 2–14)
NEUTROPHILS # BLD AUTO: 7.98 K/UL — HIGH (ref 1.8–7.4)
NEUTROPHILS NFR BLD AUTO: 87.8 % — HIGH (ref 43–77)
NRBC # BLD: 0 /100 WBCS — SIGNIFICANT CHANGE UP (ref 0–0)
NT-PROBNP SERPL-SCNC: 589 PG/ML — HIGH (ref 0–450)
PLATELET # BLD AUTO: 166 K/UL — SIGNIFICANT CHANGE UP (ref 150–400)
POTASSIUM SERPL-MCNC: 3.2 MMOL/L — LOW (ref 3.5–5.3)
POTASSIUM SERPL-SCNC: 3.2 MMOL/L — LOW (ref 3.5–5.3)
PROT SERPL-MCNC: 7 GM/DL — SIGNIFICANT CHANGE UP (ref 6–8.3)
RBC # BLD: 4.62 M/UL — SIGNIFICANT CHANGE UP (ref 4.2–5.8)
RBC # FLD: 13.6 % — SIGNIFICANT CHANGE UP (ref 10.3–14.5)
SARS-COV-2 RNA SPEC QL NAA+PROBE: SIGNIFICANT CHANGE UP
SODIUM SERPL-SCNC: 136 MMOL/L — SIGNIFICANT CHANGE UP (ref 135–145)
TROPONIN I, HIGH SENSITIVITY RESULT: 9.3 NG/L — SIGNIFICANT CHANGE UP
WBC # BLD: 9.09 K/UL — SIGNIFICANT CHANGE UP (ref 3.8–10.5)
WBC # FLD AUTO: 9.09 K/UL — SIGNIFICANT CHANGE UP (ref 3.8–10.5)

## 2023-06-25 PROCEDURE — 99285 EMERGENCY DEPT VISIT HI MDM: CPT

## 2023-06-25 PROCEDURE — 72131 CT LUMBAR SPINE W/O DYE: CPT | Mod: 26,MA

## 2023-06-25 PROCEDURE — 72125 CT NECK SPINE W/O DYE: CPT | Mod: 26,MA

## 2023-06-25 PROCEDURE — 71045 X-RAY EXAM CHEST 1 VIEW: CPT | Mod: 26

## 2023-06-25 PROCEDURE — 70450 CT HEAD/BRAIN W/O DYE: CPT | Mod: 26,MA

## 2023-06-25 PROCEDURE — 71250 CT THORAX DX C-: CPT | Mod: 26,MA

## 2023-06-25 NOTE — ED ADULT NURSE NOTE - CHIEF COMPLAINT QUOTE
hx of Prostate CA, OA hearing nurse from [facility] S/P unwitnessed pt reports slipped off bed c/o LBP pain. EMS reports sat low 80's placed  O2 with improvement denies LOC.

## 2023-06-25 NOTE — ED ADULT NURSE NOTE - NSFALLHARMRISKINTERV_ED_ALL_ED
Assistance OOB with selected safe patient handling equipment if applicable/Assistance with ambulation/Communicate risk of Fall with Harm to all staff, patient, and family/Encourage patient to sit up slowly, dangle for a short time, stand at bedside before walking/Monitor gait and stability/Orthostatic vital signs/Provide visual cue: red socks, yellow wristband, yellow gown, etc/Reinforce activity limits and safety measures with patient and family/Bed in lowest position, wheels locked, appropriate side rails in place/Call bell, personal items and telephone in reach/Instruct patient to call for assistance before getting out of bed/chair/stretcher/Non-slip footwear applied when patient is off stretcher/[place] to call system/Physically safe environment - no spills, clutter or unnecessary equipment/Purposeful Proactive Rounding/Room/bathroom lighting operational, light cord in reach
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
## 2023-06-25 NOTE — ED ADULT NURSE NOTE - OBJECTIVE STATEMENT
hx of Prostate CA, OA hearing nurse from [facility] S/P unwitnessed pt reports slipped off bed c/o LBP pain. EMS reports sat low 80's placed  O2 with improvement denies LOC. Pt alert and oriented x4, hx of Prostate CA, from [facility] S/P unwitnessed fall pt reports slipped off bed, felt weakness. Endorsed that he has been having diarrhea this week. Denies pain at this time. PSH R hip replacement.  EMS reports sat low 80's placed  O2 with improvement denies LOC. Placed on monitor NSR, on 4 L NC 98% saturation. B/L lower extremity swelling noted, family at the bedside states that it is chronic. DTI to the buttocks. No open sores or wounds. Pt alert and oriented x4, hx of Prostate CA, from [facility] S/P unwitnessed fall pt reports slipped off bed, felt weakness. Endorsed that he has been having diarrhea this week. Denies pain at this time. PSH R hip replacement.  EMS reports sat low 80's placed  O2 with improvement denies LOC. Placed on monitor NSR, on 4 L NC 98% saturation. B/L lower extremity swelling noted, family at the bedside states that it is chronic. DTI with surrounding redness to the buttocks. No open sores or wounds.

## 2023-06-25 NOTE — ED ADULT TRIAGE NOTE - CHIEF COMPLAINT QUOTE
hx of Prostate CA, OA hearing nurse from [facility] S/P unwitnessed pt reports slipped off bed c/o LBP pain. EMS reports sat low 80's placed  O2 with improvement denies LOC.

## 2023-06-26 DIAGNOSIS — E78.5 HYPERLIPIDEMIA, UNSPECIFIED: ICD-10-CM

## 2023-06-26 DIAGNOSIS — C61 MALIGNANT NEOPLASM OF PROSTATE: ICD-10-CM

## 2023-06-26 DIAGNOSIS — R09.89 OTHER SPECIFIED SYMPTOMS AND SIGNS INVOLVING THE CIRCULATORY AND RESPIRATORY SYSTEMS: ICD-10-CM

## 2023-06-26 DIAGNOSIS — M19.90 UNSPECIFIED OSTEOARTHRITIS, UNSPECIFIED SITE: ICD-10-CM

## 2023-06-26 DIAGNOSIS — I50.22 CHRONIC SYSTOLIC (CONGESTIVE) HEART FAILURE: ICD-10-CM

## 2023-06-26 DIAGNOSIS — I10 ESSENTIAL (PRIMARY) HYPERTENSION: ICD-10-CM

## 2023-06-26 PROBLEM — J30.2 OTHER SEASONAL ALLERGIC RHINITIS: Chronic | Status: ACTIVE | Noted: 2017-08-01

## 2023-06-26 PROBLEM — H91.90 UNSPECIFIED HEARING LOSS, UNSPECIFIED EAR: Chronic | Status: ACTIVE | Noted: 2017-08-01

## 2023-06-26 PROBLEM — R60.0 LOCALIZED EDEMA: Chronic | Status: ACTIVE | Noted: 2017-08-01

## 2023-06-26 LAB
APTT BLD: 31.2 SEC — SIGNIFICANT CHANGE UP (ref 27.5–35.5)
APTT BLD: >200 SEC — CRITICAL HIGH (ref 27.5–35.5)
BLOOD GAS COMMENTS ARTERIAL: SIGNIFICANT CHANGE UP
GAS PNL BLDA: SIGNIFICANT CHANGE UP
HCT VFR BLD CALC: 41.7 % — SIGNIFICANT CHANGE UP (ref 39–50)
HGB BLD-MCNC: 13.9 G/DL — SIGNIFICANT CHANGE UP (ref 13–17)
HOROWITZ INDEX BLDA+IHG-RTO: 36 — SIGNIFICANT CHANGE UP
INR BLD: 1.12 RATIO — SIGNIFICANT CHANGE UP (ref 0.88–1.16)
MCHC RBC-ENTMCNC: 30.3 PG — SIGNIFICANT CHANGE UP (ref 27–34)
MCHC RBC-ENTMCNC: 33.3 G/DL — SIGNIFICANT CHANGE UP (ref 32–36)
MCV RBC AUTO: 91 FL — SIGNIFICANT CHANGE UP (ref 80–100)
NRBC # BLD: 0 /100 WBCS — SIGNIFICANT CHANGE UP (ref 0–0)
PLATELET # BLD AUTO: 173 K/UL — SIGNIFICANT CHANGE UP (ref 150–400)
PROTHROM AB SERPL-ACNC: 13.4 SEC — SIGNIFICANT CHANGE UP (ref 10.5–13.4)
RBC # BLD: 4.58 M/UL — SIGNIFICANT CHANGE UP (ref 4.2–5.8)
RBC # FLD: 13.6 % — SIGNIFICANT CHANGE UP (ref 10.3–14.5)
TROPONIN I, HIGH SENSITIVITY RESULT: 19.9 NG/L — SIGNIFICANT CHANGE UP
WBC # BLD: 10.97 K/UL — HIGH (ref 3.8–10.5)
WBC # FLD AUTO: 10.97 K/UL — HIGH (ref 3.8–10.5)

## 2023-06-26 PROCEDURE — 71045 X-RAY EXAM CHEST 1 VIEW: CPT | Mod: 26

## 2023-06-26 PROCEDURE — 93970 EXTREMITY STUDY: CPT | Mod: 26

## 2023-06-26 PROCEDURE — 78582 LUNG VENTILAT&PERFUS IMAGING: CPT | Mod: 26

## 2023-06-26 PROCEDURE — 93010 ELECTROCARDIOGRAM REPORT: CPT | Mod: 76

## 2023-06-26 PROCEDURE — 99223 1ST HOSP IP/OBS HIGH 75: CPT

## 2023-06-26 RX ORDER — LORATADINE 10 MG/1
1 TABLET ORAL
Qty: 0 | Refills: 0 | DISCHARGE

## 2023-06-26 RX ORDER — HEPARIN SODIUM 5000 [USP'U]/ML
5000 INJECTION INTRAVENOUS; SUBCUTANEOUS EVERY 12 HOURS
Refills: 0 | Status: DISCONTINUED | OUTPATIENT
Start: 2023-06-26 | End: 2023-07-04

## 2023-06-26 RX ORDER — POTASSIUM CHLORIDE 20 MEQ
40 PACKET (EA) ORAL ONCE
Refills: 0 | Status: COMPLETED | OUTPATIENT
Start: 2023-06-26 | End: 2023-06-26

## 2023-06-26 RX ORDER — LANOLIN ALCOHOL/MO/W.PET/CERES
3 CREAM (GRAM) TOPICAL AT BEDTIME
Refills: 0 | Status: DISCONTINUED | OUTPATIENT
Start: 2023-06-26 | End: 2023-07-04

## 2023-06-26 RX ORDER — FUROSEMIDE 40 MG
40 TABLET ORAL
Refills: 0 | Status: DISCONTINUED | OUTPATIENT
Start: 2023-06-26 | End: 2023-06-27

## 2023-06-26 RX ORDER — ONDANSETRON 8 MG/1
4 TABLET, FILM COATED ORAL EVERY 8 HOURS
Refills: 0 | Status: DISCONTINUED | OUTPATIENT
Start: 2023-06-26 | End: 2023-07-04

## 2023-06-26 RX ORDER — IPRATROPIUM/ALBUTEROL SULFATE 18-103MCG
3 AEROSOL WITH ADAPTER (GRAM) INHALATION EVERY 6 HOURS
Refills: 0 | Status: DISCONTINUED | OUTPATIENT
Start: 2023-06-26 | End: 2023-06-30

## 2023-06-26 RX ORDER — LINACLOTIDE 145 UG/1
1 CAPSULE, GELATIN COATED ORAL
Refills: 0 | DISCHARGE

## 2023-06-26 RX ORDER — HEPARIN SODIUM 5000 [USP'U]/ML
7500 INJECTION INTRAVENOUS; SUBCUTANEOUS ONCE
Refills: 0 | Status: COMPLETED | OUTPATIENT
Start: 2023-06-26 | End: 2023-06-26

## 2023-06-26 RX ORDER — HEPARIN SODIUM 5000 [USP'U]/ML
INJECTION INTRAVENOUS; SUBCUTANEOUS
Qty: 25000 | Refills: 0 | Status: DISCONTINUED | OUTPATIENT
Start: 2023-06-26 | End: 2023-06-26

## 2023-06-26 RX ORDER — POTASSIUM TRIPLATES 45MEQ/15ML
0 LIQUID (ML) ORAL
Refills: 0 | DISCHARGE

## 2023-06-26 RX ORDER — FUROSEMIDE 40 MG
1 TABLET ORAL
Qty: 0 | Refills: 0 | DISCHARGE

## 2023-06-26 RX ORDER — HEPARIN SODIUM 5000 [USP'U]/ML
7500 INJECTION INTRAVENOUS; SUBCUTANEOUS EVERY 6 HOURS
Refills: 0 | Status: DISCONTINUED | OUTPATIENT
Start: 2023-06-26 | End: 2023-06-26

## 2023-06-26 RX ORDER — DOXAZOSIN MESYLATE 4 MG
1 TABLET ORAL
Qty: 0 | Refills: 0 | DISCHARGE

## 2023-06-26 RX ORDER — SENNA PLUS 8.6 MG/1
1 TABLET ORAL
Refills: 0 | Status: DISCONTINUED | OUTPATIENT
Start: 2023-06-26 | End: 2023-07-04

## 2023-06-26 RX ORDER — HEPARIN SODIUM 5000 [USP'U]/ML
3500 INJECTION INTRAVENOUS; SUBCUTANEOUS EVERY 6 HOURS
Refills: 0 | Status: DISCONTINUED | OUTPATIENT
Start: 2023-06-26 | End: 2023-06-26

## 2023-06-26 RX ORDER — BICALUTAMIDE 50 MG/1
1 TABLET, FILM COATED ORAL
Qty: 0 | Refills: 0 | DISCHARGE

## 2023-06-26 RX ORDER — ACETAMINOPHEN 500 MG
650 TABLET ORAL EVERY 6 HOURS
Refills: 0 | Status: DISCONTINUED | OUTPATIENT
Start: 2023-06-26 | End: 2023-07-04

## 2023-06-26 RX ORDER — TAMSULOSIN HYDROCHLORIDE 0.4 MG/1
0.4 CAPSULE ORAL AT BEDTIME
Refills: 0 | Status: DISCONTINUED | OUTPATIENT
Start: 2023-06-26 | End: 2023-07-04

## 2023-06-26 RX ORDER — PANTOPRAZOLE SODIUM 20 MG/1
40 TABLET, DELAYED RELEASE ORAL
Refills: 0 | Status: DISCONTINUED | OUTPATIENT
Start: 2023-06-26 | End: 2023-07-04

## 2023-06-26 RX ORDER — HEPARIN SODIUM 5000 [USP'U]/ML
1400 INJECTION INTRAVENOUS; SUBCUTANEOUS
Qty: 25000 | Refills: 0 | Status: DISCONTINUED | OUTPATIENT
Start: 2023-06-26 | End: 2023-06-26

## 2023-06-26 RX ADMIN — PANTOPRAZOLE SODIUM 40 MILLIGRAM(S): 20 TABLET, DELAYED RELEASE ORAL at 06:00

## 2023-06-26 RX ADMIN — HEPARIN SODIUM 7500 UNIT(S): 5000 INJECTION INTRAVENOUS; SUBCUTANEOUS at 03:36

## 2023-06-26 RX ADMIN — Medication 40 MILLIEQUIVALENT(S): at 03:46

## 2023-06-26 RX ADMIN — Medication 40 MILLIGRAM(S): at 09:52

## 2023-06-26 RX ADMIN — Medication 40 MILLIEQUIVALENT(S): at 09:52

## 2023-06-26 RX ADMIN — HEPARIN SODIUM 1700 UNIT(S)/HR: 5000 INJECTION INTRAVENOUS; SUBCUTANEOUS at 03:39

## 2023-06-26 RX ADMIN — HEPARIN SODIUM 7500 UNIT(S): 5000 INJECTION INTRAVENOUS; SUBCUTANEOUS at 03:34

## 2023-06-26 RX ADMIN — SENNA PLUS 1 TABLET(S): 8.6 TABLET ORAL at 05:59

## 2023-06-26 RX ADMIN — HEPARIN SODIUM 5000 UNIT(S): 5000 INJECTION INTRAVENOUS; SUBCUTANEOUS at 18:18

## 2023-06-26 RX ADMIN — HEPARIN SODIUM 0 UNIT(S)/HR: 5000 INJECTION INTRAVENOUS; SUBCUTANEOUS at 16:18

## 2023-06-26 RX ADMIN — HEPARIN SODIUM 1700 UNIT(S)/HR: 5000 INJECTION INTRAVENOUS; SUBCUTANEOUS at 04:32

## 2023-06-26 RX ADMIN — TAMSULOSIN HYDROCHLORIDE 0.4 MILLIGRAM(S): 0.4 CAPSULE ORAL at 21:25

## 2023-06-26 RX ADMIN — HEPARIN SODIUM 1700 UNIT(S)/HR: 5000 INJECTION INTRAVENOUS; SUBCUTANEOUS at 07:39

## 2023-06-26 NOTE — H&P ADULT - NSICDXFAMILYHX_GEN_ALL_CORE_FT
FAMILY HISTORY:  Father  Still living? Unknown  Family history of coronary artery disease, Age at diagnosis: Age Unknown    Mother  Still living? Unknown  Family history of rheumatic heart disease, Age at diagnosis: Age Unknown

## 2023-06-26 NOTE — ED PROVIDER NOTE - NS ED ATTENDING STATEMENT MOD
Attending Only

## 2023-06-26 NOTE — H&P ADULT - HISTORY OF PRESENT ILLNESS
This is a 89 year old male from assisted living with PMHx of  prostate cancer, HTN, HLD, CHF, OA who presents for evaluation after an unwitnessed fall while attempting to sit on his bed.  Patient is hard of hearing and a poor historian, therefore history is difficult to obtain.  +weakness +SOB.  EMS noted him to be saturating in 80s and placed on NC.

## 2023-06-26 NOTE — ED PROVIDER NOTE - CLINICAL SUMMARY MEDICAL DECISION MAKING FREE TEXT BOX
89 y/o M w/ PMH prostate cancer, R hip replacement, osteoarthritis, presenting to the ED s/p fall.   Patient saturating well on 4L O2. Speaking in full sentences.  Concern for possible PE, will send d-dimer, likely will need imaging to r/o PE  Will obtain CTH/C-spine/chest in setting of trauma to r/o traumatic injuries  Send trop, labs    D-dimer >50225. Creatinine elevated, cannot give contrast for CTA. Will order V/Q for AM and start on heparin gtt prophylactically due to high suspicion of PE in setting of hypoxia and elevated d-dimer

## 2023-06-26 NOTE — H&P ADULT - NSHPLABSRESULTS_GEN_ALL_CORE
labs personally reviewed and pertinent [facility] documents/labs/diagnostics reviewed                         13.9   9.09  )-----------( 166      ( 25 Jun 2023 21:34 )             42.7       06-25    136  |  92<L>  |  42<H>  ----------------------------<  131<H>  3.2<L>   |  36<H>  |  2.00<H>    Ca    8.6      25 Jun 2023 21:34    TPro  7.0  /  Alb  3.0<L>  /  TBili  1.0  /  DBili  x   /  AST  21  /  ALT  16  /  AlkPhos  92  06-25  PT/INR - ( 26 Jun 2023 02:22 )   PT: 13.4 sec;   INR: 1.12 ratio        PTT - ( 26 Jun 2023 02:22 )  PTT:31.2 sec    Urinalysis Basic - ( 25 Jun 2023 21:34 )    Color: x / Appearance: x / SG: x / pH: x  Gluc: 131 mg/dL / Ketone: x  / Bili: x / Urobili: x   Blood: x / Protein: x / Nitrite: x   Leuk Esterase: x / RBC: x / WBC x   Sq Epi: x / Non Sq Epi: x / Bacteria: x      EKG: read by me - NSR at 95 bpm, no ST changes or TWI; QTc 0.487      RADIOLOGY:  6/25/23 CT head and cerivcal spine w/o contrast -  IMPRESSION:  CT BRAIN:  No evidence of acute intracranial hemorrhage, midline shift or CT   evidence of acute territorial infarct.  If the patient's symptoms persist, consider short interval follow-up head   CT or brain MRI if there are no MRI contraindications.    CT CERVICAL SPINE:  No acute cervical fracture or traumatic malalignment.  MRI would be required to evaluate the ligamentous structures at higher   sensitivity as well as for better evaluation of the cervical canal and   its contents.    6/25/23 CT chest w/o contrast -  IMPRESSION:  Mild linear atelectatic changes at the lung bases.  6 mm left upper lobe lung nodule (6:55).  2 mm nodule in the lingula (6:47). The lungs are otherwise clear.   Follow-up noncontrast chest CT is recommended in 6-12 months.    6/25/23 CT lumbar spine w/o contrast -  IMPRESSION:  No acute fracture or traumatic malalignment.  Moderate to severe lumbar spondylosis. MRI would be required to evaluate   the ligamentous structures at higher sensitivity as well as for better   evaluation of the lumbar canal and its contents. labs personally reviewed and pertinent [facility] documents/labs/diagnostics reviewed                         13.9   9.09  )-----------( 166      ( 25 Jun 2023 21:34 )             42.7       06-25    136  |  92<L>  |  42<H>  ----------------------------<  131<H>  3.2<L>   |  36<H>  |  2.00<H>    Ca    8.6      25 Jun 2023 21:34    TPro  7.0  /  Alb  3.0<L>  /  TBili  1.0  /  DBili  x   /  AST  21  /  ALT  16  /  AlkPhos  92  06-25  PT/INR - ( 26 Jun 2023 02:22 )   PT: 13.4 sec;   INR: 1.12 ratio        PTT - ( 26 Jun 2023 02:22 )  PTT:31.2 sec    Urinalysis Basic - ( 25 Jun 2023 21:34 )    Color: x / Appearance: x / SG: x / pH: x  Gluc: 131 mg/dL / Ketone: x  / Bili: x / Urobili: x   Blood: x / Protein: x / Nitrite: x   Leuk Esterase: x / RBC: x / WBC x   Sq Epi: x / Non Sq Epi: x / Bacteria: x      EKG: read by me - NSR at 95 bpm, no ST changes or TWI; QTc 0.487      RADIOLOGY:  6/25/23 CT head and cervical spine w/o contrast -  IMPRESSION:  CT BRAIN:  No evidence of acute intracranial hemorrhage, midline shift or CT   evidence of acute territorial infarct.  If the patient's symptoms persist, consider short interval follow-up head   CT or brain MRI if there are no MRI contraindications.    CT CERVICAL SPINE:  No acute cervical fracture or traumatic malalignment.  MRI would be required to evaluate the ligamentous structures at higher   sensitivity as well as for better evaluation of the cervical canal and   its contents.    6/25/23 CT chest w/o contrast -  IMPRESSION:  Mild linear atelectatic changes at the lung bases.  6 mm left upper lobe lung nodule (6:55).  2 mm nodule in the lingula (6:47). The lungs are otherwise clear.   Follow-up noncontrast chest CT is recommended in 6-12 months.    6/25/23 CT lumbar spine w/o contrast -  IMPRESSION:  No acute fracture or traumatic malalignment.  Moderate to severe lumbar spondylosis. MRI would be required to evaluate   the ligamentous structures at higher sensitivity as well as for better   evaluation of the lumbar canal and its contents.    6/26/23 B/L LE venous dopplers-  IMPRESSION:  No obvious deep vein thrombosis in either lower extremity.

## 2023-06-26 NOTE — H&P ADULT - NSICDXPASTMEDICALHX_GEN_ALL_CORE_FT
PAST MEDICAL HISTORY:  Benign essential HTN     Chronic systolic congestive heart failure     Fluid retention in legs     Hearing loss     HLD (hyperlipidemia)     Osteoarthritis     Prostate cancer 48 treatments of RT    Seasonal allergies

## 2023-06-26 NOTE — ED PROVIDER NOTE - OBJECTIVE STATEMENT
89 y/o M w/ PMH prostate cancer, R hip replacement, osteoarthritis, presenting to the ED s/p fall. Patient lives at assisted living and had unwitnessed fall while trying to sit on the bed. Reports he has been feeling weak recently and per family patient with increased weakness and dyspnea. He denies chest pain.  No fever. Denies LOC, unsure if he hit his head. Per EMS, patient found to be saturating in the 80s at assisted living and placed on NC. Does not normally use oxygen. Patient is a poor historian, hx limited at this time.

## 2023-06-26 NOTE — ED PROVIDER NOTE - PRO INTERPRETER NEED 2
A full discussion of the nature of anticoagulants has been carried out. A benefit risk analysis has been presented to the patient, so that they understand the justification for choosing anticoagulation at this time. The need for frequent and regular monitoring, precise dosage adjustment and compliance is stressed. Side effects of potential bleeding are discussed. The patient should avoid any OTC items containing aspirin, naproxen or ibuprofen, and should avoid great swings in general diet. Avoid alcohol consumption. Advised to notify the office if antibiotic or steroid therapy is initiated. Call if any signs of abnormal bleeding are present. Next PT/INR test in 3 weeks.
English

## 2023-06-26 NOTE — H&P ADULT - ASSESSMENT
ASSESSMENT:  This is a 89 year old male from assisted living with PMHx of  prostate cancer, HTN, HLD, CHF, OA who presents for evaluation after an unwitnessed fall while attempting to sit on his bed.  Patient is hard of hearing and a poor historian, therefore history is difficult to obtain.     PLAN:  1.Possible PE  -afebrile, no leukocytosis  -S/P units lovenox in ED, will continue bid  -will start duoneb q6 hours prn  -will place on prn nasal cannula  -CT chest as above  -will consult GYN in AM  -will consult hematology in AM  -will f/u TTE  -initial trop9.3 at 21:34, will f/u 2nd trop  -proBNP 589  -EKG as above  -d-dimer  -will f/u B/L LE venous doppler U/S  -S/P in ED  -d-dimer 38436  -INR 1.12  -PTT 31.2  -PT 13.4  -potassium 3.2, will supplement and f/u repeat K in AM  -CO2 36, will f/u ABG  -LFTs within normal limits  -COVID PCR negative  -influenza A/B negative    2.CHF    3.HTN    4.HLD    5.CKD vs FABY   -will monitor for now    DVT PPx ASSESSMENT:  This is a 89 year old male from assisted living with PMHx of  prostate cancer, HTN, HLD, CHF, OA who presents for evaluation after an unwitnessed fall while attempting to sit on his bed.  Patient is hard of hearing and a poor historian, therefore history is difficult to obtain.     PLAN:  1.Possible PE  -afebrile, no leukocytosis  -will start duoneb q6 hours prn  -will place on prn nasal cannula  -initial trop 9.3 at 21:34, will f/u 2nd trop  -proBNP 589  -EKG as above  -B/L LE venous doppler U/S as above  -d-dimer 69180  -INR 1.12  -PTT 31.2  -PT 13.4  -potassium 3.2, will supplement and f/u repeat K in AM  -CO2 36, will f/u ABG  -LFTs within normal limits  -COVID PCR negative  -influenza A/B negative  -will f/u official report of CXRAY  -CT lumbar spine w/o contrast  as above  -CT cervical spine w/o contrast as above  -CT head w/o contrast as above  -CT chest w/o contrast as above  -started on heparin drip in ER, will continue  -will f/u V/Q scan  -would consider TTE  -will consult PT/OT in AM    2.CHF  -will continue metolazone 2.5 mg daily  -he is on lasix 40mg TIW    3.HTN    4.HLD    5.CKD vs FABY   -will monitor for now    6.Hx of prostate cancer    7.DVT PPx  -is on systemic AC w/ heparin drip

## 2023-06-26 NOTE — ED PROVIDER NOTE - PHYSICAL EXAMINATION
GENERAL: Awake, alert, NAD  HEENT: NC/AT, moist mucous membranes  LUNGS: CTAB, no wheezes or crackles   CARDIAC: RRR, no m/r/g  ABDOMEN: Soft, normal BS, non tender, non distended, no rebound, no guarding  BACK: No midline spinal tenderness, no CVA tenderness  EXT: 1+ b/l lower extremity edema, no calf tenderness, 2+ DP pulses bilaterally, no deformities.  NEURO: A&O2. Moving all extremities.  SKIN: Warm and dry. No rash.  PSYCH: Normal affect.

## 2023-06-26 NOTE — PATIENT PROFILE ADULT - DO YOU FEEL LIKE HURTING YOURSELF OR OTHERS?
Please follow-up with your primary physician in the next few days for reevaluation.  Please return to the emergency department for any worsening fevers that are not controlled with home Tylenol and ibuprofen.  Please take your antibiotics as prescribed.  
no

## 2023-06-26 NOTE — PATIENT PROFILE ADULT - FALL HARM RISK - HARM RISK INTERVENTIONS

## 2023-06-26 NOTE — ED ADULT NURSE REASSESSMENT NOTE - NS ED NURSE REASSESS COMMENT FT1
Report endorsed to SIMI Chambers. Safety checks completed this shift. Safety rounds completed hourly.  IV sites checked Q2+remains WDL. Dr. Hedrick informed of potassisum, meds ordered by provider, and SIMI Chambers informed pt pending administration. Any issues endorsed to SIMI Chambers for follow up.

## 2023-06-26 NOTE — H&P ADULT - NSHPPHYSICALEXAM_GEN_ALL_CORE
PHYSICAL EXAM:  Vital Signs:  Vital Signs (24 Hrs):  T(C): 36.9 (06-26-23 @ 03:20), Max: 37.7 (06-25-23 @ 20:30)  HR: 89 (06-26-23 @ 03:20) (78 - 89)  BP: 121/62 (06-26-23 @ 03:20) (110/64 - 138/67)  RR: 19 (06-26-23 @ 03:20) (16 - 19)  SpO2: 95% (06-26-23 @ 03:20) (95% - 99%)  Wt(kg): --  Daily Height in cm: 167.64 (25 Jun 2023 19:51)    Daily     I&O's Summary    General: no acute distress and well developed/well nourished, appears SOB  HENT: atraumatic, normocephalic, oropharynx pink and moist, sinuses nontender, normal nasal mucosa/turbinates, thyroid not enlarged or tender, no radiating carotid murmur or bruit and no masses; no JVD  Eyes: pupil equally round and reactive to light (PERRLA) bilaterally, extra-ocular muscle intact (EOMI) bilaterally, lids/conjunctiva normal bilaterally and anicteric bilaterally  Neck: normal, supple, no adenopathy and thyroid normal in size, no nodules or tenderness  CV: normal s1, s2 , regular rhythm, no murmurs, no rubs and no gallops  Lungs: decreased air entry B/L bases, poor inspiratory effort no w/r/r  Abd: normal bowel sounds, no hepatosplenomegaly, non-tender, non-distended and no masses  Musculoskeletal: no clubbing, cyanosis and full range of motion of joints: right upper extremity, left upper extremity, right lower extremity and left lower extremity;  2+ Peripheral Pulses, No clubbing, cyanosis; 2+ pitting edema of B/L feet  Neuro: alert, awake & oriented times three (AA&O x 3), cranial Nerves II-XII intact and normal strength  Psych: poor judgment and insight, normal mood/affect and non-anxious  Vascular: 2+ radial and dorsalis pedis pulses B/L equal and symmetric  Skin: no rash, warm and dry

## 2023-06-26 NOTE — CHART NOTE - NSCHARTNOTEFT_GEN_A_CORE
Hospitalist Note     Patient seen and examined. He denies any new complaints.   89 year old male from assisted living with PMHx of  prostate cancer, HTN, HLD, CHF, OA who presents for evaluation after an unwitnessed fall while attempting to sit on his bed.  Patient is hard of hearing and a poor historian.   Acute hypoxic respiratory failure of unclear etiology, c/w supplemental O2. CT chest w/ atelectatic changes at the lung bases, 6 mm left upper lobe lung nodule, 2 mm nodule in the lingula. V/Q scan w/ very low probability of PE. Will discontinue empiric heparin gtt. Check TTE.   Chronic CHF, diurese w/ IV Lasix. Check TTE   CKD vs FABY, will monitor Cr for now  POC d/w daughter Hospitalist Note     Patient seen and examined. He denies any new complaints.   89 year old male from assisted living with PMHx of  prostate cancer, HTN, HLD, CHF, OA who presents for evaluation after an unwitnessed fall while attempting to sit on his bed.  Patient is hard of hearing and a poor historian.   Acute hypoxic respiratory failure of unclear etiology, c/w supplemental O2. CT chest w/ atelectatic changes at the lung bases, 6 mm left upper lobe lung nodule, 2 mm nodule in the lingula. V/Q scan w/ very low probability of PE. Will discontinue empiric heparin gtt. Check TTE.   Chronic CHF, c/w Metolazone/Lasix. Check TTE, pro-  CKD vs FABY, will monitor Cr for now  POC d/w daughter

## 2023-06-26 NOTE — H&P ADULT - NSICDXPASTSURGICALHX_GEN_ALL_CORE_FT
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    S/P cholecystectomy 1985    S/P inguinal hernia repair 1961 cannot remember which side

## 2023-06-27 DIAGNOSIS — R91.8 OTHER NONSPECIFIC ABNORMAL FINDING OF LUNG FIELD: ICD-10-CM

## 2023-06-27 DIAGNOSIS — J96.01 ACUTE RESPIRATORY FAILURE WITH HYPOXIA: ICD-10-CM

## 2023-06-27 DIAGNOSIS — Z29.9 ENCOUNTER FOR PROPHYLACTIC MEASURES, UNSPECIFIED: ICD-10-CM

## 2023-06-27 DIAGNOSIS — E87.6 HYPOKALEMIA: ICD-10-CM

## 2023-06-27 DIAGNOSIS — N18.30 CHRONIC KIDNEY DISEASE, STAGE 3 UNSPECIFIED: ICD-10-CM

## 2023-06-27 DIAGNOSIS — I50.23 ACUTE ON CHRONIC SYSTOLIC (CONGESTIVE) HEART FAILURE: ICD-10-CM

## 2023-06-27 LAB
ANION GAP SERPL CALC-SCNC: 4 MMOL/L — LOW (ref 5–17)
BUN SERPL-MCNC: 44 MG/DL — HIGH (ref 7–23)
CALCIUM SERPL-MCNC: 9.4 MG/DL — SIGNIFICANT CHANGE UP (ref 8.5–10.1)
CHLORIDE SERPL-SCNC: 96 MMOL/L — SIGNIFICANT CHANGE UP (ref 96–108)
CO2 SERPL-SCNC: 42 MMOL/L — HIGH (ref 22–31)
CREAT SERPL-MCNC: 1.75 MG/DL — HIGH (ref 0.5–1.3)
EGFR: 37 ML/MIN/1.73M2 — LOW
GLUCOSE SERPL-MCNC: 106 MG/DL — HIGH (ref 70–99)
HCT VFR BLD CALC: 43 % — SIGNIFICANT CHANGE UP (ref 39–50)
HGB BLD-MCNC: 13.8 G/DL — SIGNIFICANT CHANGE UP (ref 13–17)
MAGNESIUM SERPL-MCNC: 2 MG/DL — SIGNIFICANT CHANGE UP (ref 1.6–2.6)
MCHC RBC-ENTMCNC: 30.1 PG — SIGNIFICANT CHANGE UP (ref 27–34)
MCHC RBC-ENTMCNC: 32.1 G/DL — SIGNIFICANT CHANGE UP (ref 32–36)
MCV RBC AUTO: 93.9 FL — SIGNIFICANT CHANGE UP (ref 80–100)
NRBC # BLD: 0 /100 WBCS — SIGNIFICANT CHANGE UP (ref 0–0)
PLATELET # BLD AUTO: 179 K/UL — SIGNIFICANT CHANGE UP (ref 150–400)
POTASSIUM SERPL-MCNC: 3.3 MMOL/L — LOW (ref 3.5–5.3)
POTASSIUM SERPL-SCNC: 3.3 MMOL/L — LOW (ref 3.5–5.3)
RBC # BLD: 4.58 M/UL — SIGNIFICANT CHANGE UP (ref 4.2–5.8)
RBC # FLD: 13.4 % — SIGNIFICANT CHANGE UP (ref 10.3–14.5)
SODIUM SERPL-SCNC: 142 MMOL/L — SIGNIFICANT CHANGE UP (ref 135–145)
TROPONIN I, HIGH SENSITIVITY RESULT: 12.7 NG/L — SIGNIFICANT CHANGE UP
WBC # BLD: 8.21 K/UL — SIGNIFICANT CHANGE UP (ref 3.8–10.5)
WBC # FLD AUTO: 8.21 K/UL — SIGNIFICANT CHANGE UP (ref 3.8–10.5)

## 2023-06-27 PROCEDURE — 99233 SBSQ HOSP IP/OBS HIGH 50: CPT

## 2023-06-27 RX ORDER — FUROSEMIDE 40 MG
40 TABLET ORAL DAILY
Refills: 0 | Status: DISCONTINUED | OUTPATIENT
Start: 2023-06-27 | End: 2023-06-28

## 2023-06-27 RX ORDER — POTASSIUM CHLORIDE 20 MEQ
40 PACKET (EA) ORAL EVERY 4 HOURS
Refills: 0 | Status: COMPLETED | OUTPATIENT
Start: 2023-06-27 | End: 2023-06-27

## 2023-06-27 RX ADMIN — PANTOPRAZOLE SODIUM 40 MILLIGRAM(S): 20 TABLET, DELAYED RELEASE ORAL at 05:13

## 2023-06-27 RX ADMIN — TAMSULOSIN HYDROCHLORIDE 0.4 MILLIGRAM(S): 0.4 CAPSULE ORAL at 21:05

## 2023-06-27 RX ADMIN — HEPARIN SODIUM 5000 UNIT(S): 5000 INJECTION INTRAVENOUS; SUBCUTANEOUS at 17:54

## 2023-06-27 RX ADMIN — Medication 40 MILLIEQUIVALENT(S): at 12:49

## 2023-06-27 RX ADMIN — HEPARIN SODIUM 5000 UNIT(S): 5000 INJECTION INTRAVENOUS; SUBCUTANEOUS at 05:13

## 2023-06-27 RX ADMIN — Medication 40 MILLIEQUIVALENT(S): at 17:54

## 2023-06-27 RX ADMIN — SENNA PLUS 1 TABLET(S): 8.6 TABLET ORAL at 05:13

## 2023-06-27 RX ADMIN — SENNA PLUS 1 TABLET(S): 8.6 TABLET ORAL at 17:53

## 2023-06-27 NOTE — PHYSICAL THERAPY INITIAL EVALUATION ADULT - GENERAL OBSERVATIONS, REHAB EVAL
Pt recd supine NAD , on O@ 3.5 l + primafit. Dtr & son in law @ bedside provided history as pt is [place] & his hearing aids are not working. Daughter is taking them for home for repairs.  Pt's O2 sats with use of O2 @3.5L is 99% . Checked on room air 88-89% so pt was seen for PT session with use of cont O2 via N/c @ 3L via portable O2 tank.  Pt  c/o LLE pain at rest  before start of PT session that resolved with  std/walking .  Noted to have  Forward head posture  in supine , sitt & std . Needs vc for keeping neck in midline for safety with ambulation .

## 2023-06-27 NOTE — PROGRESS NOTE ADULT - ASSESSMENT
ASSESSMENT:  This is a 89 year old male from assisted living with PMHx of  prostate cancer, HTN, HLD, CHF, OA who presents for evaluation after an unwitnessed fall while attempting to sit on his bed.  Patient is hard of hearing and a poor historian, therefore history is difficult to obtain.     PLAN:  1.Possible PE  -afebrile, no leukocytosis  -will start duoneb q6 hours prn  -will place on prn nasal cannula  -initial trop 9.3 at 21:34, will f/u 2nd trop  -proBNP 589  -EKG as above  -B/L LE venous doppler U/S as above  -d-dimer 44664  -INR 1.12  -PTT 31.2  -PT 13.4  -potassium 3.2, will supplement and f/u repeat K in AM  -CO2 36, will f/u ABG  -LFTs within normal limits  -COVID PCR negative  -influenza A/B negative  -will f/u official report of CXRAY  -CT lumbar spine w/o contrast  as above  -CT cervical spine w/o contrast as above  -CT head w/o contrast as above  -CT chest w/o contrast as above  -started on heparin drip in ER, will continue  -will f/u V/Q scan  -would consider TTE  -will consult PT/OT in AM    2.CHF   ASSESSMENT:  This is a 89 year old male from assisted living with PMHx of  prostate cancer, HTN, HLD, CHF, OA who presents for evaluation after an unwitnessed fall while attempting to sit on his bed.  Patient is hard of hearing and a poor historian, therefore history is difficult to obtain.

## 2023-06-27 NOTE — PROGRESS NOTE ADULT - PROBLEM SELECTOR PLAN 2
-on Metolazone 2.5mg daily and Lasix 40mg TIW at home   -pro-  -Trop 9.3-->19.9-->12.7   -will diurese w/ Lasix 40mg IV daily   -check TTE   -strict I/O's, daily weights

## 2023-06-27 NOTE — PHYSICAL THERAPY INITIAL EVALUATION ADULT - PHYSICAL ASSIST/NONPHYSICAL ASSIST: STAND/SIT, REHAB EVAL
To manage O2 tank. Also pt tends to lift the front wheels of the RW up during xfers/1 person + 1 person to manage equipment

## 2023-06-27 NOTE — PHYSICAL THERAPY INITIAL EVALUATION ADULT - AMBULATION SKILLS, REHAB EVAL
Indepdnent in room walking c Rollator to bathroom  & to dining area pprox 50ft away from his room. For furtehr distances needs to be pushed in rollator/independent/needs device and assist

## 2023-06-27 NOTE — PHYSICAL THERAPY INITIAL EVALUATION ADULT - GAIT TRAINING, PT EVAL
Pt will be able to ambulate using assistive device up to 100 ft or more,  safely observing proper gait, posture and prevent falls.

## 2023-06-28 LAB
ANION GAP SERPL CALC-SCNC: 5 MMOL/L — SIGNIFICANT CHANGE UP (ref 5–17)
BUN SERPL-MCNC: 44 MG/DL — HIGH (ref 7–23)
CALCIUM SERPL-MCNC: 8.8 MG/DL — SIGNIFICANT CHANGE UP (ref 8.5–10.1)
CHLORIDE SERPL-SCNC: 95 MMOL/L — LOW (ref 96–108)
CO2 SERPL-SCNC: 39 MMOL/L — HIGH (ref 22–31)
CREAT SERPL-MCNC: 1.59 MG/DL — HIGH (ref 0.5–1.3)
EGFR: 41 ML/MIN/1.73M2 — LOW
GLUCOSE SERPL-MCNC: 106 MG/DL — HIGH (ref 70–99)
HCT VFR BLD CALC: 42.7 % — SIGNIFICANT CHANGE UP (ref 39–50)
HGB BLD-MCNC: 13.5 G/DL — SIGNIFICANT CHANGE UP (ref 13–17)
MAGNESIUM SERPL-MCNC: 2.1 MG/DL — SIGNIFICANT CHANGE UP (ref 1.6–2.6)
MCHC RBC-ENTMCNC: 29.6 PG — SIGNIFICANT CHANGE UP (ref 27–34)
MCHC RBC-ENTMCNC: 31.6 G/DL — LOW (ref 32–36)
MCV RBC AUTO: 93.6 FL — SIGNIFICANT CHANGE UP (ref 80–100)
NRBC # BLD: 0 /100 WBCS — SIGNIFICANT CHANGE UP (ref 0–0)
PHOSPHATE SERPL-MCNC: 2.4 MG/DL — LOW (ref 2.5–4.5)
PLATELET # BLD AUTO: 167 K/UL — SIGNIFICANT CHANGE UP (ref 150–400)
POTASSIUM SERPL-MCNC: 3.5 MMOL/L — SIGNIFICANT CHANGE UP (ref 3.5–5.3)
POTASSIUM SERPL-SCNC: 3.5 MMOL/L — SIGNIFICANT CHANGE UP (ref 3.5–5.3)
RBC # BLD: 4.56 M/UL — SIGNIFICANT CHANGE UP (ref 4.2–5.8)
RBC # FLD: 13.2 % — SIGNIFICANT CHANGE UP (ref 10.3–14.5)
SODIUM SERPL-SCNC: 139 MMOL/L — SIGNIFICANT CHANGE UP (ref 135–145)
WBC # BLD: 6.39 K/UL — SIGNIFICANT CHANGE UP (ref 3.8–10.5)
WBC # FLD AUTO: 6.39 K/UL — SIGNIFICANT CHANGE UP (ref 3.8–10.5)

## 2023-06-28 PROCEDURE — 99233 SBSQ HOSP IP/OBS HIGH 50: CPT

## 2023-06-28 RX ORDER — FUROSEMIDE 40 MG
40 TABLET ORAL
Refills: 0 | Status: DISCONTINUED | OUTPATIENT
Start: 2023-06-28 | End: 2023-06-30

## 2023-06-28 RX ADMIN — Medication 40 MILLIGRAM(S): at 13:37

## 2023-06-28 RX ADMIN — HEPARIN SODIUM 5000 UNIT(S): 5000 INJECTION INTRAVENOUS; SUBCUTANEOUS at 05:08

## 2023-06-28 RX ADMIN — PANTOPRAZOLE SODIUM 40 MILLIGRAM(S): 20 TABLET, DELAYED RELEASE ORAL at 05:09

## 2023-06-28 RX ADMIN — Medication 40 MILLIGRAM(S): at 05:08

## 2023-06-28 RX ADMIN — SENNA PLUS 1 TABLET(S): 8.6 TABLET ORAL at 17:12

## 2023-06-28 RX ADMIN — HEPARIN SODIUM 5000 UNIT(S): 5000 INJECTION INTRAVENOUS; SUBCUTANEOUS at 17:12

## 2023-06-28 RX ADMIN — SENNA PLUS 1 TABLET(S): 8.6 TABLET ORAL at 05:09

## 2023-06-28 RX ADMIN — TAMSULOSIN HYDROCHLORIDE 0.4 MILLIGRAM(S): 0.4 CAPSULE ORAL at 22:09

## 2023-06-28 NOTE — OCCUPATIONAL THERAPY INITIAL EVALUATION ADULT - PERTINENT HX OF CURRENT PROBLEM, REHAB EVAL
Pt is a 89 y/o male who presented to ER for evaluation after an unwitnessed fall while attempting to sit on his bed. Pt is diagnosed with acute respiratory failure with hypoxia . Pt has PMHx of  prostate cancer, HTN, HLD, CHF and OA. Doppler on BLE 6/26/23 results  no obvious deep vein thrombosis in either lower extremity. Head CT 6/25/23 results showed  evidence of acute intracranial hemorrhage, midline shift or CT   evidence of acute territorial infarct.

## 2023-06-28 NOTE — OCCUPATIONAL THERAPY INITIAL EVALUATION ADULT - IMPAIRMENTS CONTRIBUTING IMPAIRED BED MOBILITY, REHAB EVAL
impaired balance/cognition/impaired coordination/impaired motor control/abnormal muscle tone/narrow base of support/pain/decreased ROM/decreased strength

## 2023-06-28 NOTE — OCCUPATIONAL THERAPY INITIAL EVALUATION ADULT - RANGE OF MOTION EXAMINATION
Pt has a kyphotic posture/trunk was WFL (within functional limits)/neck was WFL (within functional limits)

## 2023-06-28 NOTE — OCCUPATIONAL THERAPY INITIAL EVALUATION ADULT - STRENGTHENING, PT EVAL
Pt will increased BUE/LE muscle strength by 1 full grade to maximize independence with functional mobility and self care tasks within 8 weeks

## 2023-06-28 NOTE — DIETITIAN INITIAL EVALUATION ADULT - REASON
Unable to conduct nutrition-focused physical exam as pt sleeping during visit; pt with visible signs of mild temporal wasting and mild orbital depletion observed

## 2023-06-28 NOTE — OCCUPATIONAL THERAPY INITIAL EVALUATION ADULT - GENERAL OBSERVATIONS, REHAB EVAL
Pt was seen for initial OT consult, encountered in bed  chair on cardiac monitoring and O2 via nasal canula; pt was AA&Ox3, cooperative & followed commands. Pt c/o left wrist pain with movements. Pt presents with generalized weakness; this limits pt's activity tolerance ,balance, ADL management and functional mobility

## 2023-06-28 NOTE — OCCUPATIONAL THERAPY INITIAL EVALUATION ADULT - PLANNED THERAPY INTERVENTIONS, OT EVAL
energy conservation techniques/ADL retraining/balance training/bed mobility training/fine motor coordination training/joint mobilization/massage/motor coordination training/neuromuscular re-education/ROM/strengthening/stretching/transfer training

## 2023-06-28 NOTE — DIETITIAN INITIAL EVALUATION ADULT - ETIOLOGY
Decreased ability to consume sufficient energy related to acute respiratory failure with hypoxia, weakness, AMS

## 2023-06-28 NOTE — PROGRESS NOTE ADULT - PROBLEM SELECTOR PLAN 2
-on Metolazone 2.5mg daily and Lasix 40mg TIW at home   -pro-  -Trop 9.3-->19.9-->12.7   -will diurese w/ Lasix 40mg IV daily   -check TTE   -strict I/O's, daily weights -on Metolazone 2.5mg daily and Lasix 40mg TIW at home   -pro-  -Trop 9.3-->19.9-->12.7   -diurese w/ Lasix 40mg IV BID  -F/up TTE   -strict I/O's, daily weights

## 2023-06-28 NOTE — DIETITIAN INITIAL EVALUATION ADULT - PERTINENT MEDS FT
MEDICATIONS  (STANDING):  furosemide   Injectable 40 milliGRAM(s) IV Push two times a day  heparin   Injectable 5000 Unit(s) SubCutaneous every 12 hours  pantoprazole    Tablet 40 milliGRAM(s) Oral before breakfast  senna 1 Tablet(s) Oral two times a day  tamsulosin 0.4 milliGRAM(s) Oral at bedtime    MEDICATIONS  (PRN):  acetaminophen     Tablet .. 650 milliGRAM(s) Oral every 6 hours PRN Temp greater or equal to 38C (100.4F), Mild Pain (1 - 3)  albuterol/ipratropium for Nebulization 3 milliLiter(s) Nebulizer every 6 hours PRN Shortness of Breath and/or Wheezing  aluminum hydroxide/magnesium hydroxide/simethicone Suspension 30 milliLiter(s) Oral every 4 hours PRN Dyspepsia  melatonin 3 milliGRAM(s) Oral at bedtime PRN Insomnia  ondansetron Injectable 4 milliGRAM(s) IV Push every 8 hours PRN Nausea and/or Vomiting

## 2023-06-28 NOTE — DIETITIAN INITIAL EVALUATION ADULT - OTHER INFO
Pt with PMHx of HTN, HLD, CHF, OA, prostate cancer, & fluid retention in legs. Per H&P, pt hard of hearing & poor historian. Pt presented from assisted living facility for evaluation after an unwitnessed fall, with weakness and SOB; admitted for acute respiratory failure with hypoxia.  Pt sleeping at time of visit; as per flow sheets, pt obtunded.  Per chart review, pt was on No Added Salt diet at assisted living facility. Currently on DASH/TLC diet. Per PCA, pt tolerating diet, no chew/swallowing difficulty reported. Pt consuming 0% for some meals, and 100% of other meals. Pt appears to like breakfast and ate 100% of breakfast this AM per PCA. Requires total feeding assistance. Will provide Ensure nutrition supplement for additional nutrition.

## 2023-06-28 NOTE — PROGRESS NOTE ADULT - ASSESSMENT
ASSESSMENT:  This is a 89 year old male from assisted living with PMHx of  prostate cancer, HTN, HLD, CHF, OA who presents for evaluation after an unwitnessed fall while attempting to sit on his bed.  Patient is hard of hearing and a poor historian, therefore history is difficult to obtain.     PLAN:  1.Possible PE  -afebrile, no leukocytosis  -will start duoneb q6 hours prn  -will place on prn nasal cannula  -initial trop 9.3 at 21:34, will f/u 2nd trop  -proBNP 589  -EKG as above  -B/L LE venous doppler U/S as above  -d-dimer 56314  -INR 1.12  -PTT 31.2  -PT 13.4  -potassium 3.2, will supplement and f/u repeat K in AM  -CO2 36, will f/u ABG  -LFTs within normal limits  -COVID PCR negative  -influenza A/B negative  -will f/u official report of CXRAY  -CT lumbar spine w/o contrast  as above  -CT cervical spine w/o contrast as above  -CT head w/o contrast as above  -CT chest w/o contrast as above  -started on heparin drip in ER, will continue  -will f/u V/Q scan  -would consider TTE  -will consult PT/OT in AM    2.CHF   ASSESSMENT:  This is a 89 year old male from assisted living with PMHx of  prostate cancer, HTN, HLD, CHF, OA who presents for evaluation after an unwitnessed fall while attempting to sit on his bed.  Patient is hard of hearing and a poor historian, therefore history is difficult to obtain.

## 2023-06-28 NOTE — OCCUPATIONAL THERAPY INITIAL EVALUATION ADULT - ADDITIONAL COMMENTS
Prior to admission, pt was ambulating independently with rollator approximately 50 feet to the dinning room. Staff assisted with meal, bathing and dressing. Presently pt needs total assistance with functional mobility with self care tasks due to pain, weakness, stiffness and decreased ROM. Pt is right hand dominant and wears glasses for reading.

## 2023-06-28 NOTE — OCCUPATIONAL THERAPY INITIAL EVALUATION ADULT - PATIENT/FAMILY/SIGNIFICANT OTHER GOALS STATEMENT, OT EVAL
Pt wants to be able to ambulate independently so that he can return to the [facility]

## 2023-06-28 NOTE — OCCUPATIONAL THERAPY INITIAL EVALUATION ADULT - BALANCE TRAINING, PT EVAL
Pt will increased sitting balance from fair- to good in 8 weeks  to prevent falls, optimize pt's ability for ADL management & safely navigate in all terrains

## 2023-06-28 NOTE — DIETITIAN INITIAL EVALUATION ADULT - PERTINENT LABORATORY DATA
06-28    139  |  95<L>  |  44<H>  ----------------------------<  106<H>  3.5   |  39<H>  |  1.59<H>    Ca    8.8      28 Jun 2023 05:50  Phos  2.4     06-28  Mg     2.1     06-28

## 2023-06-29 DIAGNOSIS — I50.33 ACUTE ON CHRONIC DIASTOLIC (CONGESTIVE) HEART FAILURE: ICD-10-CM

## 2023-06-29 LAB
ANION GAP SERPL CALC-SCNC: 7 MMOL/L — SIGNIFICANT CHANGE UP (ref 5–17)
BUN SERPL-MCNC: 53 MG/DL — HIGH (ref 7–23)
CALCIUM SERPL-MCNC: 9.2 MG/DL — SIGNIFICANT CHANGE UP (ref 8.5–10.1)
CHLORIDE SERPL-SCNC: 92 MMOL/L — LOW (ref 96–108)
CO2 SERPL-SCNC: 39 MMOL/L — HIGH (ref 22–31)
CREAT SERPL-MCNC: 1.69 MG/DL — HIGH (ref 0.5–1.3)
EGFR: 38 ML/MIN/1.73M2 — LOW
FLUAV AG NPH QL: SIGNIFICANT CHANGE UP
FLUBV AG NPH QL: SIGNIFICANT CHANGE UP
GLUCOSE SERPL-MCNC: 116 MG/DL — HIGH (ref 70–99)
HCT VFR BLD CALC: 44.8 % — SIGNIFICANT CHANGE UP (ref 39–50)
HGB BLD-MCNC: 14.3 G/DL — SIGNIFICANT CHANGE UP (ref 13–17)
MAGNESIUM SERPL-MCNC: 2.1 MG/DL — SIGNIFICANT CHANGE UP (ref 1.6–2.6)
MCHC RBC-ENTMCNC: 29.6 PG — SIGNIFICANT CHANGE UP (ref 27–34)
MCHC RBC-ENTMCNC: 31.9 G/DL — LOW (ref 32–36)
MCV RBC AUTO: 92.8 FL — SIGNIFICANT CHANGE UP (ref 80–100)
NRBC # BLD: 0 /100 WBCS — SIGNIFICANT CHANGE UP (ref 0–0)
PHOSPHATE SERPL-MCNC: 2.6 MG/DL — SIGNIFICANT CHANGE UP (ref 2.5–4.5)
PLATELET # BLD AUTO: 185 K/UL — SIGNIFICANT CHANGE UP (ref 150–400)
POTASSIUM SERPL-MCNC: 3.1 MMOL/L — LOW (ref 3.5–5.3)
POTASSIUM SERPL-SCNC: 3.1 MMOL/L — LOW (ref 3.5–5.3)
RBC # BLD: 4.83 M/UL — SIGNIFICANT CHANGE UP (ref 4.2–5.8)
RBC # FLD: 13 % — SIGNIFICANT CHANGE UP (ref 10.3–14.5)
SARS-COV-2 RNA SPEC QL NAA+PROBE: SIGNIFICANT CHANGE UP
SODIUM SERPL-SCNC: 138 MMOL/L — SIGNIFICANT CHANGE UP (ref 135–145)
WBC # BLD: 6.33 K/UL — SIGNIFICANT CHANGE UP (ref 3.8–10.5)
WBC # FLD AUTO: 6.33 K/UL — SIGNIFICANT CHANGE UP (ref 3.8–10.5)

## 2023-06-29 PROCEDURE — 99233 SBSQ HOSP IP/OBS HIGH 50: CPT

## 2023-06-29 RX ORDER — POTASSIUM CHLORIDE 20 MEQ
20 PACKET (EA) ORAL
Refills: 0 | Status: COMPLETED | OUTPATIENT
Start: 2023-06-29 | End: 2023-06-29

## 2023-06-29 RX ADMIN — HEPARIN SODIUM 5000 UNIT(S): 5000 INJECTION INTRAVENOUS; SUBCUTANEOUS at 17:25

## 2023-06-29 RX ADMIN — TAMSULOSIN HYDROCHLORIDE 0.4 MILLIGRAM(S): 0.4 CAPSULE ORAL at 21:17

## 2023-06-29 RX ADMIN — Medication 20 MILLIEQUIVALENT(S): at 13:47

## 2023-06-29 RX ADMIN — Medication 40 MILLIGRAM(S): at 13:47

## 2023-06-29 RX ADMIN — SENNA PLUS 1 TABLET(S): 8.6 TABLET ORAL at 06:04

## 2023-06-29 RX ADMIN — PANTOPRAZOLE SODIUM 40 MILLIGRAM(S): 20 TABLET, DELAYED RELEASE ORAL at 06:04

## 2023-06-29 RX ADMIN — SENNA PLUS 1 TABLET(S): 8.6 TABLET ORAL at 17:25

## 2023-06-29 RX ADMIN — Medication 20 MILLIEQUIVALENT(S): at 17:25

## 2023-06-29 RX ADMIN — Medication 40 MILLIGRAM(S): at 06:04

## 2023-06-29 RX ADMIN — HEPARIN SODIUM 5000 UNIT(S): 5000 INJECTION INTRAVENOUS; SUBCUTANEOUS at 06:10

## 2023-06-29 NOTE — PROGRESS NOTE ADULT - ASSESSMENT
ASSESSMENT:  This is a 89 year old male from assisted living with PMHx of  prostate cancer, HTN, HLD, CHF, OA who presents for evaluation after an unwitnessed fall while attempting to sit on his bed.  Patient is hard of hearing and a poor historian, found to be hypoxic.

## 2023-06-29 NOTE — PROGRESS NOTE ADULT - PROBLEM SELECTOR PLAN 2
-on Metolazone 2.5mg daily and Lasix 40mg TIW at home   -pro-  -Trop 9.3-->19.9-->12.7   -TTE w/ normal global left ventricular systolic function (EF 60-65%), Grade I diastolic dysfunction  -diurese w/ Lasix 40mg IV BID  -strict I/O's, daily weights

## 2023-06-30 LAB
HCT VFR BLD CALC: 44.7 % — SIGNIFICANT CHANGE UP (ref 39–50)
HGB BLD-MCNC: 14.4 G/DL — SIGNIFICANT CHANGE UP (ref 13–17)
MCHC RBC-ENTMCNC: 29.6 PG — SIGNIFICANT CHANGE UP (ref 27–34)
MCHC RBC-ENTMCNC: 32.2 G/DL — SIGNIFICANT CHANGE UP (ref 32–36)
MCV RBC AUTO: 92 FL — SIGNIFICANT CHANGE UP (ref 80–100)
NRBC # BLD: 0 /100 WBCS — SIGNIFICANT CHANGE UP (ref 0–0)
PLATELET # BLD AUTO: 221 K/UL — SIGNIFICANT CHANGE UP (ref 150–400)
RBC # BLD: 4.86 M/UL — SIGNIFICANT CHANGE UP (ref 4.2–5.8)
RBC # FLD: 13 % — SIGNIFICANT CHANGE UP (ref 10.3–14.5)
WBC # BLD: 5.76 K/UL — SIGNIFICANT CHANGE UP (ref 3.8–10.5)
WBC # FLD AUTO: 5.76 K/UL — SIGNIFICANT CHANGE UP (ref 3.8–10.5)

## 2023-06-30 PROCEDURE — 99232 SBSQ HOSP IP/OBS MODERATE 35: CPT

## 2023-06-30 RX ORDER — FUROSEMIDE 40 MG
40 TABLET ORAL DAILY
Refills: 0 | Status: DISCONTINUED | OUTPATIENT
Start: 2023-07-01 | End: 2023-07-04

## 2023-06-30 RX ORDER — IPRATROPIUM/ALBUTEROL SULFATE 18-103MCG
3 AEROSOL WITH ADAPTER (GRAM) INHALATION EVERY 6 HOURS
Refills: 0 | Status: DISCONTINUED | OUTPATIENT
Start: 2023-06-30 | End: 2023-07-04

## 2023-06-30 RX ADMIN — Medication 3 MILLILITER(S): at 23:12

## 2023-06-30 RX ADMIN — HEPARIN SODIUM 5000 UNIT(S): 5000 INJECTION INTRAVENOUS; SUBCUTANEOUS at 05:20

## 2023-06-30 RX ADMIN — Medication 40 MILLIGRAM(S): at 05:21

## 2023-06-30 RX ADMIN — Medication 3 MILLILITER(S): at 18:25

## 2023-06-30 RX ADMIN — HEPARIN SODIUM 5000 UNIT(S): 5000 INJECTION INTRAVENOUS; SUBCUTANEOUS at 17:12

## 2023-06-30 RX ADMIN — TAMSULOSIN HYDROCHLORIDE 0.4 MILLIGRAM(S): 0.4 CAPSULE ORAL at 22:04

## 2023-06-30 RX ADMIN — PANTOPRAZOLE SODIUM 40 MILLIGRAM(S): 20 TABLET, DELAYED RELEASE ORAL at 07:07

## 2023-06-30 RX ADMIN — SENNA PLUS 1 TABLET(S): 8.6 TABLET ORAL at 17:12

## 2023-06-30 RX ADMIN — SENNA PLUS 1 TABLET(S): 8.6 TABLET ORAL at 05:21

## 2023-06-30 NOTE — PROGRESS NOTE ADULT - PROBLEM SELECTOR PLAN 2
-on Metolazone 2.5mg daily and Lasix 40mg TIW at home   -leg edema improved   -pro-  -Trop 9.3-->19.9-->12.7   -TTE w/ normal global left ventricular systolic function (EF 60-65%), Grade I diastolic dysfunction  -s/p diuresis w/ Lasix 40mg IV BID, will transition to Lasix 40mg PO daily   -strict I/O's, daily weights

## 2023-06-30 NOTE — PROGRESS NOTE ADULT - PROBLEM SELECTOR PLAN 7
HSQ    PT recommends rehab    Spoke w/ CM/SW, possible d/c to rehab over the weekend if respiratory status improves

## 2023-07-01 LAB
ANION GAP SERPL CALC-SCNC: 6 MMOL/L — SIGNIFICANT CHANGE UP (ref 5–17)
BUN SERPL-MCNC: 72 MG/DL — HIGH (ref 7–23)
CALCIUM SERPL-MCNC: 9.5 MG/DL — SIGNIFICANT CHANGE UP (ref 8.5–10.1)
CHLORIDE SERPL-SCNC: 91 MMOL/L — LOW (ref 96–108)
CO2 SERPL-SCNC: 37 MMOL/L — HIGH (ref 22–31)
CREAT SERPL-MCNC: 1.85 MG/DL — HIGH (ref 0.5–1.3)
EGFR: 34 ML/MIN/1.73M2 — LOW
GLUCOSE SERPL-MCNC: 118 MG/DL — HIGH (ref 70–99)
MAGNESIUM SERPL-MCNC: 2 MG/DL — SIGNIFICANT CHANGE UP (ref 1.6–2.6)
POTASSIUM SERPL-MCNC: 3.4 MMOL/L — LOW (ref 3.5–5.3)
POTASSIUM SERPL-SCNC: 3.4 MMOL/L — LOW (ref 3.5–5.3)
SODIUM SERPL-SCNC: 134 MMOL/L — LOW (ref 135–145)

## 2023-07-01 PROCEDURE — 99232 SBSQ HOSP IP/OBS MODERATE 35: CPT

## 2023-07-01 RX ORDER — POTASSIUM CHLORIDE 20 MEQ
10 PACKET (EA) ORAL ONCE
Refills: 0 | Status: COMPLETED | OUTPATIENT
Start: 2023-07-01 | End: 2023-07-01

## 2023-07-01 RX ORDER — POLYETHYLENE GLYCOL 3350 17 G/17G
17 POWDER, FOR SOLUTION ORAL DAILY
Refills: 0 | Status: DISCONTINUED | OUTPATIENT
Start: 2023-07-01 | End: 2023-07-04

## 2023-07-01 RX ADMIN — Medication 3 MILLILITER(S): at 11:21

## 2023-07-01 RX ADMIN — PANTOPRAZOLE SODIUM 40 MILLIGRAM(S): 20 TABLET, DELAYED RELEASE ORAL at 06:42

## 2023-07-01 RX ADMIN — POLYETHYLENE GLYCOL 3350 17 GRAM(S): 17 POWDER, FOR SOLUTION ORAL at 15:38

## 2023-07-01 RX ADMIN — Medication 3 MILLILITER(S): at 17:06

## 2023-07-01 RX ADMIN — SENNA PLUS 1 TABLET(S): 8.6 TABLET ORAL at 06:42

## 2023-07-01 RX ADMIN — HEPARIN SODIUM 5000 UNIT(S): 5000 INJECTION INTRAVENOUS; SUBCUTANEOUS at 17:14

## 2023-07-01 RX ADMIN — TAMSULOSIN HYDROCHLORIDE 0.4 MILLIGRAM(S): 0.4 CAPSULE ORAL at 22:09

## 2023-07-01 RX ADMIN — Medication 3 MILLILITER(S): at 05:27

## 2023-07-01 RX ADMIN — Medication 10 MILLIEQUIVALENT(S): at 11:26

## 2023-07-01 RX ADMIN — SENNA PLUS 1 TABLET(S): 8.6 TABLET ORAL at 17:14

## 2023-07-01 RX ADMIN — HEPARIN SODIUM 5000 UNIT(S): 5000 INJECTION INTRAVENOUS; SUBCUTANEOUS at 06:42

## 2023-07-01 RX ADMIN — Medication 3 MILLILITER(S): at 23:04

## 2023-07-01 NOTE — PROGRESS NOTE ADULT - ASSESSMENT
ASSESSMENT:  This is a 89 year old male from assisted living with PMHx of  prostate cancer, HTN, HLD, CHF, OA who presents for evaluation after an unwitnessed fall while attempting to sit on his bed.  Patient is hard of hearing and a poor historian, found to be hypoxic.       CHF with preserve ef    HF with LVEF =50 percent may be caused by HF with preserved ejection fraction (HFpEF) or a cardiomyopathy (restrictive, hypertrophic, or noncompaction).  NYHA claa 11 1       - possible decompensation due to compliance with diet MD follow up and medication    - Monitor in telemetry  recommendations for treatment of patients with HFpEF included in the 2013 American College of Cardiology Foundation/American Heart Association (ACC/AHA) HF guidelines [9]. Systolic and diastolic hypertension should be controlled in accordance with published clinical practiceguidelines to prevent morbidity.Diuretics should be used to relieve symptoms due to volume overload  - Lasix IV for patients with clear evidence of HFpEF (including increased BNP) who can be carefully monitored for changes in serum potassium and renal function, there is suggestion of  adding spironolactone therapy to their medical regimens. The serum potassium should be <5.0 mEq/L and estimated glomerular filtration rate should be =30 mL/min per 1.73 m2. Hospitalization for HF was less frequent in the spironolactone group (12.0 percent) compared with the placebo group (14.2 percent(TOPCAT trial )   -Cardiac markers CPK/Troponin serial - Echocardiogram to evaluate LV function  ?The treatment of HFpEF is largely governed by management of associated conditions and symptoms since trial data for specific pharmacologic therapy have been negative. The general principles for treatment of HFpEF are control of pulmonary congestion and peripheral edema with diuretics, treatment of systolic hypertension, prevention of rapid heart rates, particularly in patients with atrial fibrillation (AF), and coronary revascularization in patients with coronary heart disease with ischemia judged to contribute to symptoms of HF.     ??For patients with clear evidence of HFpEF and current or recent (eg, within 60 days) elevated natriuretic peptide (either B-type natriuretic peptide [BNP] =100 pg/mL or N-terminal pro-BNP =360 pg/mL) who can be carefully monitored for changes in serum potassium and renal function, we suggest treatment with a mineralocorticoid antagonist (Grade 2B).        ?For patients with HFpEF who require additional medication for blood pressure control despite treatment with a mineralocorticoid receptor antagonist, and particularly for those with LVEF at the lower end of the HFpEF range (eg, less than 55 percent), we suggest treatment with sacubitril-valsartan rather than other agents (Grade 2C). Clinical trial data with important limitations suggest that sacubitril-valsartan may reduce HF hospitalizations in some patients with HFpEF; however, the benefit appears to be small. We do not routinely use sacubitril-valsartan in other patients with HFpEF because any potential benefit is likely outweighed by the risks associated with the drug (eg, hypotension) and high cost.       ?Patients with HFpEF experience morbidities (eg, hospitalization for HF) at a rate that is virtually the same as that seen in patients with HF with reduced EF (HFrEF). Mortality rates in both HFpEF and HFrEF are high; published data on differences in mortality rates are conflicting.

## 2023-07-02 DIAGNOSIS — R55 SYNCOPE AND COLLAPSE: ICD-10-CM

## 2023-07-02 LAB
ALBUMIN SERPL ELPH-MCNC: 3 G/DL — LOW (ref 3.3–5)
ALP SERPL-CCNC: 82 U/L — SIGNIFICANT CHANGE UP (ref 40–120)
ALT FLD-CCNC: 23 U/L — SIGNIFICANT CHANGE UP (ref 12–78)
ANION GAP SERPL CALC-SCNC: 4 MMOL/L — LOW (ref 5–17)
ANION GAP SERPL CALC-SCNC: 6 MMOL/L — SIGNIFICANT CHANGE UP (ref 5–17)
AST SERPL-CCNC: 22 U/L — SIGNIFICANT CHANGE UP (ref 15–37)
BILIRUB SERPL-MCNC: 0.5 MG/DL — SIGNIFICANT CHANGE UP (ref 0.2–1.2)
BUN SERPL-MCNC: 77 MG/DL — HIGH (ref 7–23)
BUN SERPL-MCNC: 77 MG/DL — HIGH (ref 7–23)
CALCIUM SERPL-MCNC: 10 MG/DL — SIGNIFICANT CHANGE UP (ref 8.5–10.1)
CALCIUM SERPL-MCNC: 9.4 MG/DL — SIGNIFICANT CHANGE UP (ref 8.5–10.1)
CHLORIDE SERPL-SCNC: 93 MMOL/L — LOW (ref 96–108)
CHLORIDE SERPL-SCNC: 93 MMOL/L — LOW (ref 96–108)
CK MB BLD-MCNC: <1.9 % — SIGNIFICANT CHANGE UP (ref 0–3.5)
CK MB CFR SERPL CALC: <1 NG/ML — SIGNIFICANT CHANGE UP (ref 0.5–3.6)
CK SERPL-CCNC: 53 U/L — SIGNIFICANT CHANGE UP (ref 26–308)
CO2 SERPL-SCNC: 35 MMOL/L — HIGH (ref 22–31)
CO2 SERPL-SCNC: 37 MMOL/L — HIGH (ref 22–31)
CREAT SERPL-MCNC: 2.06 MG/DL — HIGH (ref 0.5–1.3)
CREAT SERPL-MCNC: 2.07 MG/DL — HIGH (ref 0.5–1.3)
EGFR: 30 ML/MIN/1.73M2 — LOW
EGFR: 30 ML/MIN/1.73M2 — LOW
GLUCOSE BLDC GLUCOMTR-MCNC: 126 MG/DL — HIGH (ref 70–99)
GLUCOSE SERPL-MCNC: 133 MG/DL — HIGH (ref 70–99)
GLUCOSE SERPL-MCNC: 146 MG/DL — HIGH (ref 70–99)
HCT VFR BLD CALC: 43.5 % — SIGNIFICANT CHANGE UP (ref 39–50)
HGB BLD-MCNC: 14.4 G/DL — SIGNIFICANT CHANGE UP (ref 13–17)
MAGNESIUM SERPL-MCNC: 2.1 MG/DL — SIGNIFICANT CHANGE UP (ref 1.6–2.6)
MAGNESIUM SERPL-MCNC: 2.1 MG/DL — SIGNIFICANT CHANGE UP (ref 1.6–2.6)
MCHC RBC-ENTMCNC: 30.2 PG — SIGNIFICANT CHANGE UP (ref 27–34)
MCHC RBC-ENTMCNC: 33.1 G/DL — SIGNIFICANT CHANGE UP (ref 32–36)
MCV RBC AUTO: 91.2 FL — SIGNIFICANT CHANGE UP (ref 80–100)
NRBC # BLD: 0 /100 WBCS — SIGNIFICANT CHANGE UP (ref 0–0)
PHOSPHATE SERPL-MCNC: 3.4 MG/DL — SIGNIFICANT CHANGE UP (ref 2.5–4.5)
PLATELET # BLD AUTO: 271 K/UL — SIGNIFICANT CHANGE UP (ref 150–400)
POTASSIUM SERPL-MCNC: 3.7 MMOL/L — SIGNIFICANT CHANGE UP (ref 3.5–5.3)
POTASSIUM SERPL-MCNC: 3.9 MMOL/L — SIGNIFICANT CHANGE UP (ref 3.5–5.3)
POTASSIUM SERPL-SCNC: 3.7 MMOL/L — SIGNIFICANT CHANGE UP (ref 3.5–5.3)
POTASSIUM SERPL-SCNC: 3.9 MMOL/L — SIGNIFICANT CHANGE UP (ref 3.5–5.3)
PROT SERPL-MCNC: 7 GM/DL — SIGNIFICANT CHANGE UP (ref 6–8.3)
RBC # BLD: 4.77 M/UL — SIGNIFICANT CHANGE UP (ref 4.2–5.8)
RBC # FLD: 13.1 % — SIGNIFICANT CHANGE UP (ref 10.3–14.5)
SODIUM SERPL-SCNC: 134 MMOL/L — LOW (ref 135–145)
SODIUM SERPL-SCNC: 134 MMOL/L — LOW (ref 135–145)
TROPONIN I, HIGH SENSITIVITY RESULT: 10.4 NG/L — SIGNIFICANT CHANGE UP
WBC # BLD: 11.26 K/UL — HIGH (ref 3.8–10.5)
WBC # FLD AUTO: 11.26 K/UL — HIGH (ref 3.8–10.5)

## 2023-07-02 PROCEDURE — 71045 X-RAY EXAM CHEST 1 VIEW: CPT | Mod: 26

## 2023-07-02 PROCEDURE — 99291 CRITICAL CARE FIRST HOUR: CPT

## 2023-07-02 PROCEDURE — 99232 SBSQ HOSP IP/OBS MODERATE 35: CPT | Mod: FS

## 2023-07-02 PROCEDURE — 93010 ELECTROCARDIOGRAM REPORT: CPT

## 2023-07-02 RX ORDER — SODIUM CHLORIDE 9 MG/ML
250 INJECTION INTRAMUSCULAR; INTRAVENOUS; SUBCUTANEOUS ONCE
Refills: 0 | Status: COMPLETED | OUTPATIENT
Start: 2023-07-02 | End: 2023-07-02

## 2023-07-02 RX ORDER — SODIUM CHLORIDE 9 MG/ML
1000 INJECTION INTRAMUSCULAR; INTRAVENOUS; SUBCUTANEOUS
Refills: 0 | Status: DISCONTINUED | OUTPATIENT
Start: 2023-07-02 | End: 2023-07-02

## 2023-07-02 RX ADMIN — SENNA PLUS 1 TABLET(S): 8.6 TABLET ORAL at 06:37

## 2023-07-02 RX ADMIN — TAMSULOSIN HYDROCHLORIDE 0.4 MILLIGRAM(S): 0.4 CAPSULE ORAL at 21:23

## 2023-07-02 RX ADMIN — Medication 40 MILLIGRAM(S): at 06:37

## 2023-07-02 RX ADMIN — HEPARIN SODIUM 5000 UNIT(S): 5000 INJECTION INTRAVENOUS; SUBCUTANEOUS at 06:37

## 2023-07-02 RX ADMIN — HEPARIN SODIUM 5000 UNIT(S): 5000 INJECTION INTRAVENOUS; SUBCUTANEOUS at 17:08

## 2023-07-02 RX ADMIN — PANTOPRAZOLE SODIUM 40 MILLIGRAM(S): 20 TABLET, DELAYED RELEASE ORAL at 06:37

## 2023-07-02 RX ADMIN — SODIUM CHLORIDE 250 MILLILITER(S): 9 INJECTION INTRAMUSCULAR; INTRAVENOUS; SUBCUTANEOUS at 12:10

## 2023-07-02 RX ADMIN — Medication 3 MILLILITER(S): at 12:35

## 2023-07-02 RX ADMIN — Medication 3 MILLILITER(S): at 23:16

## 2023-07-02 RX ADMIN — Medication 3 MILLILITER(S): at 17:30

## 2023-07-02 RX ADMIN — Medication 3 MILLILITER(S): at 05:43

## 2023-07-02 RX ADMIN — SENNA PLUS 1 TABLET(S): 8.6 TABLET ORAL at 17:07

## 2023-07-02 RX ADMIN — POLYETHYLENE GLYCOL 3350 17 GRAM(S): 17 POWDER, FOR SOLUTION ORAL at 11:22

## 2023-07-02 NOTE — RAPID RESPONSE TEAM SUMMARY - NSOTHERINTERVENTIONSRRT_GEN_ALL_CORE
CXR  labs- cbc, cmp, mag, phos, cardiac enzymes  orthostatic BP    Discussed with Dr. Limon  Discused with pt's attending, Dr. Morrison at bedside    Time spent 45 min  CXR  labs- cbc, cmp, mag, phos, cardiac enzymes  orthostatic BP. Will consider midodrine depending on vitals    Discussed with Dr. Limon  Discused with pt's attending, Dr. Morrison at bedside    Time spent 45 min

## 2023-07-02 NOTE — RAPID RESPONSE TEAM SUMMARY - NSSITUATIONBACKGROUNDRRT_GEN_ALL_CORE
Responded to RRT called for near syncopal episode. RN and PCA were getting pt out of bed with assistive device, jaspal and pt had brief episode of "blank stare" and "slumped over." per staff at bedside pt did not lose consciousness. Per staff pt is constipated. Also at RRT Dr. Limon.  HPI:  This is a 89 year old male from assisted living with PMHx of  prostate cancer, HTN, HLD, CHF, OA who presents for evaluation after an unwitnessed fall while attempting to sit on his bed.  Patient is hard of hearing and a poor historian, therefore history is difficult to obtain.  +weakness +SOB.  EMS noted him to be saturating in 80s and placed on NC. (26 Jun 2023 05:42)

## 2023-07-02 NOTE — RAPID RESPONSE TEAM SUMMARY - NSADDTLFINDINGSRRT_GEN_ALL_CORE
Pt now a+o, at baseline mental status per staff at bedside  /62  RR 18 T98.2o2 sat 94% on 2L NC Blood sugar 126  Basic Metabolic Panel (07.02.23 @ 07:30)    Sodium: 134 mmol/L    Potassium: 3.7 mmol/L    Chloride: 93 mmol/L    Carbon Dioxide: 35 mmol/L    Anion Gap: 6 mmol/L    Blood Urea Nitrogen: 77 mg/dL    Creatinine: 2.06 mg/dL    Glucose: 133 mg/dL    Calcium: 9.4 mg/dL    eGFR: 30: The estimated glomerular filtration rate (eGFR) is calculated using the  2021 CKD-EPI creatinine equation, which does not have a coefficient for  race and is validated in individuals 18 years of age and older (N Engl J  Med 2021; 385:8491-3451). Creatinine-based eGFR may be inaccurate in  various situations including but not limited to extremes of muscle mass,  altered dietary protein intake, or medications that affect renal tubular  creatinine secretion. mL/min/1.73m2    < from: TTE Echo Complete w/o Contrast w/ Doppler (06.28.23 @ 09:18) >    Summary:   1. Left ventricular ejection fraction, by visual estimation, is 60 to   65%.   2. Technically difficult study.   3. Normal global left ventricular systolic function.   4. Normal left ventricular internal cavity size.   5. Spectral Doppler shows impaired relaxation patternof left   ventricular myocardial filling (Grade I diastolic dysfunction).   6. Normal right ventricular size and function.   7. Mildly enlarged left atrium.   8. Normal right atrial size.   9. There is no evidence of pericardial effusion.  10. Mild thickening and calcification of the anterior and posterior   mitral valve leaflets.  11. Trace mitral valve regurgitation.  12. Sclerotic aortic valve with normal opening.  13. Mild pulmonic valve regurgitation.    < end of copied text >

## 2023-07-03 LAB
ALBUMIN SERPL ELPH-MCNC: 2.9 G/DL — LOW (ref 3.3–5)
ALP SERPL-CCNC: 81 U/L — SIGNIFICANT CHANGE UP (ref 40–120)
ALT FLD-CCNC: 26 U/L — SIGNIFICANT CHANGE UP (ref 12–78)
ANION GAP SERPL CALC-SCNC: 5 MMOL/L — SIGNIFICANT CHANGE UP (ref 5–17)
AST SERPL-CCNC: 26 U/L — SIGNIFICANT CHANGE UP (ref 15–37)
BILIRUB SERPL-MCNC: 0.7 MG/DL — SIGNIFICANT CHANGE UP (ref 0.2–1.2)
BUN SERPL-MCNC: 80 MG/DL — HIGH (ref 7–23)
CALCIUM SERPL-MCNC: 10 MG/DL — SIGNIFICANT CHANGE UP (ref 8.5–10.1)
CHLORIDE SERPL-SCNC: 96 MMOL/L — SIGNIFICANT CHANGE UP (ref 96–108)
CO2 SERPL-SCNC: 37 MMOL/L — HIGH (ref 22–31)
CREAT SERPL-MCNC: 2 MG/DL — HIGH (ref 0.5–1.3)
EGFR: 31 ML/MIN/1.73M2 — LOW
FLUAV AG NPH QL: SIGNIFICANT CHANGE UP
FLUBV AG NPH QL: SIGNIFICANT CHANGE UP
GLUCOSE SERPL-MCNC: 139 MG/DL — HIGH (ref 70–99)
HCT VFR BLD CALC: 43.1 % — SIGNIFICANT CHANGE UP (ref 39–50)
HGB BLD-MCNC: 14.1 G/DL — SIGNIFICANT CHANGE UP (ref 13–17)
MCHC RBC-ENTMCNC: 29.7 PG — SIGNIFICANT CHANGE UP (ref 27–34)
MCHC RBC-ENTMCNC: 32.7 G/DL — SIGNIFICANT CHANGE UP (ref 32–36)
MCV RBC AUTO: 90.9 FL — SIGNIFICANT CHANGE UP (ref 80–100)
NRBC # BLD: 0 /100 WBCS — SIGNIFICANT CHANGE UP (ref 0–0)
PLATELET # BLD AUTO: 249 K/UL — SIGNIFICANT CHANGE UP (ref 150–400)
POTASSIUM SERPL-MCNC: 3.7 MMOL/L — SIGNIFICANT CHANGE UP (ref 3.5–5.3)
POTASSIUM SERPL-SCNC: 3.7 MMOL/L — SIGNIFICANT CHANGE UP (ref 3.5–5.3)
PROT SERPL-MCNC: 6.9 GM/DL — SIGNIFICANT CHANGE UP (ref 6–8.3)
RBC # BLD: 4.74 M/UL — SIGNIFICANT CHANGE UP (ref 4.2–5.8)
RBC # FLD: 13.3 % — SIGNIFICANT CHANGE UP (ref 10.3–14.5)
SARS-COV-2 RNA SPEC QL NAA+PROBE: SIGNIFICANT CHANGE UP
SODIUM SERPL-SCNC: 138 MMOL/L — SIGNIFICANT CHANGE UP (ref 135–145)
WBC # BLD: 8.89 K/UL — SIGNIFICANT CHANGE UP (ref 3.8–10.5)
WBC # FLD AUTO: 8.89 K/UL — SIGNIFICANT CHANGE UP (ref 3.8–10.5)

## 2023-07-03 PROCEDURE — 99232 SBSQ HOSP IP/OBS MODERATE 35: CPT

## 2023-07-03 RX ADMIN — Medication 40 MILLIGRAM(S): at 05:49

## 2023-07-03 RX ADMIN — Medication 3 MILLILITER(S): at 23:26

## 2023-07-03 RX ADMIN — POLYETHYLENE GLYCOL 3350 17 GRAM(S): 17 POWDER, FOR SOLUTION ORAL at 11:41

## 2023-07-03 RX ADMIN — Medication 3 MILLILITER(S): at 05:31

## 2023-07-03 RX ADMIN — TAMSULOSIN HYDROCHLORIDE 0.4 MILLIGRAM(S): 0.4 CAPSULE ORAL at 21:34

## 2023-07-03 RX ADMIN — SENNA PLUS 1 TABLET(S): 8.6 TABLET ORAL at 17:29

## 2023-07-03 RX ADMIN — HEPARIN SODIUM 5000 UNIT(S): 5000 INJECTION INTRAVENOUS; SUBCUTANEOUS at 17:31

## 2023-07-03 RX ADMIN — PANTOPRAZOLE SODIUM 40 MILLIGRAM(S): 20 TABLET, DELAYED RELEASE ORAL at 05:48

## 2023-07-03 RX ADMIN — HEPARIN SODIUM 5000 UNIT(S): 5000 INJECTION INTRAVENOUS; SUBCUTANEOUS at 05:49

## 2023-07-03 RX ADMIN — Medication 3 MILLILITER(S): at 11:06

## 2023-07-03 RX ADMIN — SENNA PLUS 1 TABLET(S): 8.6 TABLET ORAL at 05:49

## 2023-07-03 RX ADMIN — Medication 3 MILLILITER(S): at 17:27

## 2023-07-03 NOTE — PROGRESS NOTE ADULT - ASSESSMENT
ASSESSMENT:  This is a 89 year old male from assisted living with PMHx of  prostate cancer, HTN, HLD, CHF, OA who presents for evaluation after an unwitnessed fall while attempting to sit on his bed.  Patient is hard of hearing and a poor historian, found to be hypoxic.       CHF with preserve ef  HF with LVEF =50 percent may be caused by HF with preserved ejection fraction (HFpEF) or a cardiomyopathy (restrictive, hypertrophic, or noncompaction).  NYHA class 11 1  - possible decompensation due to compliance with diet MD follow up and medication    - Monitor in telemetry  recommendations for treatment of patients with HFpEF included in the 2013 American College of Cardiology Foundation/American Heart Association (ACC/AHA) HF guidelines  Systolic and diastolic hypertension should be controlled in accordance with published clinical practiceguidelines to prevent morbidity.Diuretics should be used to relieve symptoms due to volume overload  - Lasix IV for patients with clear evidence of HFpEF (including increased BNP) who can be carefully monitored for changes in serum potassium and renal function, there is suggestion of  adding spironolactone therapy to their medical regimens. The serum potassium should be <5.0 mEq/L and estimated glomerular filtration rate should be =30 mL/min per 1.73 m2. Hospitalization for HF was less frequent in the spironolactone group (12.0 percent) compared with the placebo group (14.2 percent(TOPCAT trial )   -Cardiac markers CPK/Troponin serial negative for ACS  Echocardiogram   ?The treatment of HFpEF is largely governed by management of associated conditions and symptoms since trial data for specific pharmacologic therapy have been negative. The general principles for treatment of HFpEF are control of pulmonary congestion and peripheral edema with diuretics, treatment of systolic hypertension, prevention of rapid heart rates, particularly in patients with atrial fibrillation (AF), and coronary revascularization in patients with coronary heart disease with ischemia judged to contribute to symptoms of HF.         CKD III, baseline Cr unknown   Diuresis as above   Avoid nephrotoxic agents   will dc metazalone with worsening renal function   labs in am       treatment with sacubitril-valsartan rather than other agents (Grade 2C). Clinical trial data with important limitations suggest that sacubitril-valsartan may reduce HF hospitalizations in some patients with HFpEF; however, the benefit appears to be small. We do not routinely use sacubitril-valsartan in other patients with HFpEF because any potential benefit is likely outweighed by the risks associated with the drug (eg, hypotension) and high cost.   DISCUSS and advise to discuss with pcp and cardiologist as outpatient .

## 2023-07-04 ENCOUNTER — TRANSCRIPTION ENCOUNTER (OUTPATIENT)
Age: 88
End: 2023-07-04

## 2023-07-04 VITALS
HEART RATE: 76 BPM | SYSTOLIC BLOOD PRESSURE: 107 MMHG | TEMPERATURE: 98 F | DIASTOLIC BLOOD PRESSURE: 69 MMHG | OXYGEN SATURATION: 96 % | RESPIRATION RATE: 18 BRPM

## 2023-07-04 LAB
ALBUMIN SERPL ELPH-MCNC: 2.9 G/DL — LOW (ref 3.3–5)
ALP SERPL-CCNC: 78 U/L — SIGNIFICANT CHANGE UP (ref 40–120)
ALT FLD-CCNC: 32 U/L — SIGNIFICANT CHANGE UP (ref 12–78)
ANION GAP SERPL CALC-SCNC: 4 MMOL/L — LOW (ref 5–17)
AST SERPL-CCNC: 37 U/L — SIGNIFICANT CHANGE UP (ref 15–37)
BILIRUB SERPL-MCNC: 0.7 MG/DL — SIGNIFICANT CHANGE UP (ref 0.2–1.2)
BUN SERPL-MCNC: 78 MG/DL — HIGH (ref 7–23)
CALCIUM SERPL-MCNC: 10.1 MG/DL — SIGNIFICANT CHANGE UP (ref 8.5–10.1)
CHLORIDE SERPL-SCNC: 94 MMOL/L — LOW (ref 96–108)
CO2 SERPL-SCNC: 37 MMOL/L — HIGH (ref 22–31)
CREAT SERPL-MCNC: 1.83 MG/DL — HIGH (ref 0.5–1.3)
EGFR: 35 ML/MIN/1.73M2 — LOW
GLUCOSE SERPL-MCNC: 114 MG/DL — HIGH (ref 70–99)
HCT VFR BLD CALC: 41 % — SIGNIFICANT CHANGE UP (ref 39–50)
HGB BLD-MCNC: 13.2 G/DL — SIGNIFICANT CHANGE UP (ref 13–17)
MCHC RBC-ENTMCNC: 29.9 PG — SIGNIFICANT CHANGE UP (ref 27–34)
MCHC RBC-ENTMCNC: 32.2 G/DL — SIGNIFICANT CHANGE UP (ref 32–36)
MCV RBC AUTO: 92.8 FL — SIGNIFICANT CHANGE UP (ref 80–100)
NRBC # BLD: 0 /100 WBCS — SIGNIFICANT CHANGE UP (ref 0–0)
PLATELET # BLD AUTO: 246 K/UL — SIGNIFICANT CHANGE UP (ref 150–400)
POTASSIUM SERPL-MCNC: 4.1 MMOL/L — SIGNIFICANT CHANGE UP (ref 3.5–5.3)
POTASSIUM SERPL-SCNC: 4.1 MMOL/L — SIGNIFICANT CHANGE UP (ref 3.5–5.3)
PROT SERPL-MCNC: 6.7 GM/DL — SIGNIFICANT CHANGE UP (ref 6–8.3)
RBC # BLD: 4.42 M/UL — SIGNIFICANT CHANGE UP (ref 4.2–5.8)
RBC # FLD: 13.4 % — SIGNIFICANT CHANGE UP (ref 10.3–14.5)
SODIUM SERPL-SCNC: 135 MMOL/L — SIGNIFICANT CHANGE UP (ref 135–145)
WBC # BLD: 8.27 K/UL — SIGNIFICANT CHANGE UP (ref 3.8–10.5)
WBC # FLD AUTO: 8.27 K/UL — SIGNIFICANT CHANGE UP (ref 3.8–10.5)

## 2023-07-04 PROCEDURE — 99239 HOSP IP/OBS DSCHRG MGMT >30: CPT

## 2023-07-04 RX ORDER — SENNA PLUS 8.6 MG/1
1 TABLET ORAL
Refills: 0 | DISCHARGE

## 2023-07-04 RX ORDER — OMEPRAZOLE 10 MG/1
1 CAPSULE, DELAYED RELEASE ORAL
Refills: 0 | DISCHARGE

## 2023-07-04 RX ORDER — TAMSULOSIN HYDROCHLORIDE 0.4 MG/1
1 CAPSULE ORAL
Refills: 0 | DISCHARGE

## 2023-07-04 RX ORDER — METOLAZONE 5 MG/1
1 TABLET ORAL
Refills: 0 | DISCHARGE

## 2023-07-04 RX ORDER — PANTOPRAZOLE SODIUM 20 MG/1
1 TABLET, DELAYED RELEASE ORAL
Qty: 0 | Refills: 0 | DISCHARGE
Start: 2023-07-04

## 2023-07-04 RX ORDER — TAMSULOSIN HYDROCHLORIDE 0.4 MG/1
1 CAPSULE ORAL
Qty: 0 | Refills: 0 | DISCHARGE
Start: 2023-07-04

## 2023-07-04 RX ORDER — DOCUSATE SODIUM 100 MG
1 CAPSULE ORAL
Refills: 0 | DISCHARGE

## 2023-07-04 RX ORDER — FUROSEMIDE 40 MG
1 TABLET ORAL
Refills: 0 | DISCHARGE

## 2023-07-04 RX ORDER — IPRATROPIUM/ALBUTEROL SULFATE 18-103MCG
3 AEROSOL WITH ADAPTER (GRAM) INHALATION
Qty: 0 | Refills: 0 | DISCHARGE
Start: 2023-07-04

## 2023-07-04 RX ORDER — FUROSEMIDE 40 MG
1 TABLET ORAL
Qty: 0 | Refills: 0 | DISCHARGE
Start: 2023-07-04

## 2023-07-04 RX ADMIN — Medication 3 MILLILITER(S): at 11:11

## 2023-07-04 RX ADMIN — PANTOPRAZOLE SODIUM 40 MILLIGRAM(S): 20 TABLET, DELAYED RELEASE ORAL at 05:38

## 2023-07-04 RX ADMIN — SENNA PLUS 1 TABLET(S): 8.6 TABLET ORAL at 05:38

## 2023-07-04 RX ADMIN — Medication 3 MILLILITER(S): at 05:43

## 2023-07-04 RX ADMIN — HEPARIN SODIUM 5000 UNIT(S): 5000 INJECTION INTRAVENOUS; SUBCUTANEOUS at 05:40

## 2023-07-04 RX ADMIN — Medication 40 MILLIGRAM(S): at 05:38

## 2023-07-04 NOTE — DISCHARGE NOTE PROVIDER - NSDCMRMEDTOKEN_GEN_ALL_CORE_FT
Colace 100 mg oral capsule: 1 orally 2 times a day  Flomax 0.4 mg oral capsule: 1 orally once a day  furosemide 40 mg oral tablet: 1 orally 3 times a week  Linzess 145 mcg oral capsule: 1 orally once a day  metOLazone 2.5 mg oral tablet: 1 orally once a day  omeprazole 20 mg oral delayed release capsule: 1 orally once a day  pantoprazole 40 mg oral delayed release tablet: 1 tab(s) orally once a day (before a meal)  senna (sennosides) 8.6 mg oral tablet: 1 orally 2 times a day  Tri-K 45 mEq/15 mL oral solution: orally once a day   furosemide 40 mg oral tablet: 1 tab(s) orally once a day  ipratropium-albuterol 0.5 mg-2.5 mg/3 mL inhalation solution: 3 milliliter(s) inhaled every 6 hours  Linzess 145 mcg oral capsule: 1 orally once a day  pantoprazole 40 mg oral delayed release tablet: 1 tab(s) orally once a day (before a meal)  tamsulosin 0.4 mg oral capsule: 1 cap(s) orally once a day (at bedtime)  Tri-K 45 mEq/15 mL oral solution: orally once a day

## 2023-07-04 NOTE — PROGRESS NOTE ADULT - PROBLEM SELECTOR PROBLEM 2
Acute on chronic diastolic congestive heart failure
Acute on chronic systolic congestive heart failure
Acute on chronic diastolic congestive heart failure
Acute on chronic systolic congestive heart failure

## 2023-07-04 NOTE — DISCHARGE NOTE PROVIDER - HOSPITAL COURSE
This is a 89 year old male from assisted living with PMHx of  prostate cancer, HTN, HLD, CHF, OA who presents for evaluation after an unwitnessed fall while attempting to sit on his bed.  Patient is hard of hearing and a poor historian, found to be hypoxic. This is a 89 year old male from assisted living with PMHx of  prostate cancer, HTN, HLD, CHF, OA ,hfpef, hard of hearing who presents for evaluation after an unwitnessed fall while attempting to sit on his bed.  pt was noted to be hypoxic on admission . labs revealed bnp 589 ,cr head ,c spine, negative for acute injury fracture , initial concern for pulmonary embolus and pt was started on heparin drip and vq scan completed which was negative for high probability pulmonary embolus and heaprin drip discontinued pt was started on lasix metolazone for hfpef with metolazone held on 7/3 . This is a 89 year old male from assisted living with PMHx of  prostate cancer, HTN, HLD, CHF, OA ,hfpef, hard of hearing who presents for evaluation after an unwitnessed fall while attempting to sit on his bed.  pt was noted to be hypoxic on admission . labs revealed bnp 589 ,cr head ,c spine, negative for acute injury fracture , initial concern for pulmonary embolus and pt was started on heparin drip and vq scan completed which was negative for high probability pulmonary embolus and heparin drip discontinued pt was started on lasix metolazone for hfpef with metolazone held on 7/3 . This is a 89 year old male from assisted living with PMHx of  prostate cancer, HTN, HLD, CHF, OA ,hfpef, hard of hearing who presents for evaluation after an unwitnessed fall while attempting to sit on his bed.  pt was noted to be hypoxic on admission . labs revealed bnp 589 ,cr head ,c spine, negative for acute injury fracture , initial concern for pulmonary embolus and pt was started on heparin drip and vq scan completed which was negative for high probability pulmonary embolus and heparin drip discontinued pt was started on lasix metolazone for hfpef with metolazone held on 7/3 . pt was see and evaluated by physical therapy and pt will benefit fromsub acute rehab.

## 2023-07-04 NOTE — DISCHARGE NOTE NURSING/CASE MANAGEMENT/SOCIAL WORK - PATIENT PORTAL LINK FT
You can access the FollowMyHealth Patient Portal offered by [facility] by registering at the following website: [email or web address]. By joining [employer]’s FollowMyHealth portal, you will also be able to view your health information using other applications (apps) compatible with our system.

## 2023-07-04 NOTE — DISCHARGE NOTE PROVIDER - NSDCCPCAREPLAN_GEN_ALL_CORE_FT
PRINCIPAL DISCHARGE DIAGNOSIS  Diagnosis: Acute respiratory failure with hypoxia  Assessment and Plan of Treatment: Please continue to follow up with primary care provider

## 2023-07-04 NOTE — PROGRESS NOTE ADULT - PROBLEM SELECTOR PLAN 1
Likely d/t fluid overload  CT chest w/ mild linear atelectatic changes at the lung bases, lung nodules as below  V/Q scan w/ very low probability of PE, discontinued heparin gtt   Diuresis w/ IV Lasix  Supplemental O2, wean off as able   Monitor O2 sats
Likely d/t fluid overload  CT chest w/ mild linear atelectatic changes at the lung bases, lung nodules as below  V/Q scan w/ very low probability of PE, discontinued heparin gtt   s/p diuresis w/ IV Lasix, transition to PO  Duonebs ATC x 24hrs d/t wheezing   Supplemental O2, wean off as able   Monitor O2 sats
Likely d/t fluid overload  CT chest w/ mild linear atelectatic changes at the lung bases, lung nodules as below  V/Q scan w/ very low probability of PE, discontinued heparin gtt   Diuresis w/ IV Lasix  Supplemental O2, monitor O2 sats
Likely d/t fluid overload  CT chest w/ mild linear atelectatic changes at the lung bases, lung nodules as below  V/Q scan w/ very low probability of PE, discontinued heparin gtt   s/p diuresis w/ IV Lasix, transition to PO  Duonebs ATC x 24hrs d/t wheezing   Supplemental O2, wean off as able   Monitor O2 sats
Likely d/t fluid overload  CT chest w/ mild linear atelectatic changes at the lung bases, lung nodules as below  V/Q scan w/ very low probability of PE, discontinued heparin gtt   Diuresis w/ IV Lasix  Supplemental O2, monitor O2 sats

## 2023-07-04 NOTE — DISCHARGE NOTE NURSING/CASE MANAGEMENT/SOCIAL WORK - NSDCPEFALRISK_GEN_ALL_CORE
For information on Fall & Injury Prevention, visit: [email or web address] OR  [email or web address] OR  https://www.cdc.gov/steadi/patient.html

## 2023-07-04 NOTE — PROGRESS NOTE ADULT - PROBLEM SELECTOR PLAN 5
Supplement K
Supplement K prn
Supplement K prn

## 2023-07-04 NOTE — PROGRESS NOTE ADULT - ASSESSMENT
ASSESSMENT:  This is a 89 year old male from assisted living with PMHx of  prostate cancer, HTN, HLD, CHF, OA who presents for evaluation after an unwitnessed fall while attempting to sit on his bed.  Patient is hard of hearing and a poor historian, found to be hypoxic.     #Acute on chronic HFpEF  HF with LVEF =50 percent may be caused by HF with preserved ejection fraction (HFpEF) or a cardiomyopathy (restrictive, hypertrophic, or noncompaction).  NYHA class 11 1  - possible decompensation due to compliance with diet MD follow up and medication    - Monitor in telemetry  - TTE reviewed    # FABY on CKD III,  Diuresis as above   Avoid nephrotoxic agents   d/c metazalone with worsening renal function     Dispo- Pending DARRYL

## 2023-07-04 NOTE — DISCHARGE NOTE PROVIDER - ATTENDING DISCHARGE PHYSICAL EXAMINATION:
Vital Signs Last 24 Hrs  T(F): 97.7 (04 Jul 2023 10:34), Max: 97.8 (04 Jul 2023 04:25)  HR: 76 (04 Jul 2023 11:25) (74 - 92)  BP: 101/67 (04 Jul 2023 10:34) (92/57 - 107/69)  RR: 18 (04 Jul 2023 10:34) (17 - 18)  SpO2: 97% (04 Jul 2023 11:25) (94% - 100%)    Physical Exam:  Constitutional: NAD, awake and alert  Respiratory: cta b/l no wheezing no rhonchi  Cardiovascular: +s1/s2 no edema b/l le  Gastrointestinal: soft nt nd bs+  Vascular: 2+ peripheral pulses  Neurological: A/O x 3, no focal deficits

## 2023-07-04 NOTE — PROGRESS NOTE ADULT - PROBLEM SELECTOR PLAN 3
Diuresis as above   Monitor BP

## 2023-07-04 NOTE — PROGRESS NOTE ADULT - PROVIDER SPECIALTY LIST ADULT
Internal Medicine
Internal Medicine
Hospitalist
Hospitalist
Internal Medicine
Hospitalist

## 2023-07-04 NOTE — PROGRESS NOTE ADULT - PROBLEM SELECTOR PLAN 4
CKD III, baseline Cr unknown   Diuresis as above   Avoid nephrotoxic agents   Monitor Cr

## 2023-07-04 NOTE — PROGRESS NOTE ADULT - SUBJECTIVE AND OBJECTIVE BOX
Patient is a 89y old  Male who presents with a chief complaint of SOB (01 Jul 2023 09:16)      INTERVAL HPI/OVERNIGHT EVENTS:    MEDICATIONS  (STANDING):  albuterol/ipratropium for Nebulization 3 milliLiter(s) Nebulizer every 6 hours  furosemide    Tablet 40 milliGRAM(s) Oral daily  heparin   Injectable 5000 Unit(s) SubCutaneous every 12 hours  pantoprazole    Tablet 40 milliGRAM(s) Oral before breakfast  polyethylene glycol 3350 17 Gram(s) Oral daily  senna 1 Tablet(s) Oral two times a day  tamsulosin 0.4 milliGRAM(s) Oral at bedtime    MEDICATIONS  (PRN):  acetaminophen     Tablet .. 650 milliGRAM(s) Oral every 6 hours PRN Temp greater or equal to 38C (100.4F), Mild Pain (1 - 3)  aluminum hydroxide/magnesium hydroxide/simethicone Suspension 30 milliLiter(s) Oral every 4 hours PRN Dyspepsia  melatonin 3 milliGRAM(s) Oral at bedtime PRN Insomnia  ondansetron Injectable 4 milliGRAM(s) IV Push every 8 hours PRN Nausea and/or Vomiting      Allergies    No Known Allergies    Intolerances        REVIEW OF SYSTEMS:  CONSTITUTIONAL: No fever, weight loss, or fatigue  EYES: No eye pain, visual disturbances, or discharge  ENMT:  No difficulty hearing, tinnitus, vertigo; No sinus or throat pain  NECK: No pain or stiffness  BREASTS: No pain, masses, or nipple discharge  RESPIRATORY: No cough, wheezing, chills or hemoptysis; No shortness of breath  CARDIOVASCULAR: No chest pain, palpitations, dizziness, or leg swelling  GASTROINTESTINAL: No abdominal or epigastric pain. No nausea, vomiting, or hematemesis; No diarrhea or constipation. No melena or hematochezia.  GENITOURINARY: No dysuria, frequency, hematuria, or incontinence  NEUROLOGICAL: No headaches, memory loss, loss of strength, numbness, or tremors  SKIN: No itching, burning, rashes, or lesions   LYMPH NODES: No enlarged glands  ENDOCRINE: No heat or cold intolerance; No hair loss  MUSCULOSKELETAL: No joint pain or swelling; No muscle, back, or extremity pain  PSYCHIATRIC: No depression, anxiety, mood swings, or difficulty sleeping  HEME/LYMPH: No easy bruising, or bleeding gums  ALLERGY AND IMMUNOLOGIC: No hives or eczema    Vital Signs Last 24 Hrs  T(C): 36.6 (02 Jul 2023 04:29), Max: 37.3 (01 Jul 2023 10:47)  T(F): 97.9 (02 Jul 2023 04:29), Max: 99.1 (01 Jul 2023 10:47)  HR: 97 (02 Jul 2023 04:29) (77 - 97)  BP: 112/72 (02 Jul 2023 04:29) (94/60 - 130/74)  BP(mean): --  RR: 18 (02 Jul 2023 04:29) (18 - 18)  SpO2: 96% (02 Jul 2023 04:29) (92% - 98%)    Parameters below as of 02 Jul 2023 08:20  Patient On (Oxygen Delivery Method): nasal cannula        PHYSICAL EXAM:  GENERAL: NAD, well-groomed, well-developed  HEAD:  Atraumatic, Normocephalic  EYES: EOMI, PERRLA, conjunctiva and sclera clear  ENMT: No tonsillar erythema, exudates, or enlargement; Moist mucous membranes, Good dentition, No lesions  NECK: Supple, No JVD, Normal thyroid  NERVOUS SYSTEM:  Alert & Oriented X3, Good concentration; Motor Strength 5/5 B/L upper and lower extremities; DTRs 2+ intact and symmetric  CHEST/LUNG: Clear to percussion bilaterally; No rales, rhonchi, wheezing, or rubs  HEART: Regular rate and rhythm; No murmurs, rubs, or gallops  ABDOMEN: Soft, Nontender, Nondistended; Bowel sounds present  EXTREMITIES:  2+ Peripheral Pulses, No clubbing, cyanosis, or edema  LYMPH: No lymphadenopathy noted  SKIN: No rashes or lesions    LABS:    07-02    134<L>  |  93<L>  |  77<H>  ----------------------------<  133<H>  3.7   |  35<H>  |  2.06<H>    Ca    9.4      02 Jul 2023 07:30  Mg     2.1     07-02        Urinalysis Basic - ( 02 Jul 2023 07:30 )    Color: x / Appearance: x / SG: x / pH: x  Gluc: 133 mg/dL / Ketone: x  / Bili: x / Urobili: x   Blood: x / Protein: x / Nitrite: x   Leuk Esterase: x / RBC: x / WBC x   Sq Epi: x / Non Sq Epi: x / Bacteria: x      CAPILLARY BLOOD GLUCOSE          RADIOLOGY & ADDITIONAL TESTS:    Imaging Personally Reviewed:  [ X] YES  [ ] NO    Consultant(s) Notes Reviewed:  [ X] YES  [ ] NO    Care Discussed with Consultants/Other Providers [X ] YES  [ ] NO
Patient is a 89y old  Male who presents with a chief complaint of SOB (02 Jul 2023 10:26)      INTERVAL HPI/OVERNIGHT EVENTS:  positive sob off and on     MEDICATIONS  (STANDING):  albuterol/ipratropium for Nebulization 3 milliLiter(s) Nebulizer every 6 hours  furosemide    Tablet 40 milliGRAM(s) Oral daily  heparin   Injectable 5000 Unit(s) SubCutaneous every 12 hours  pantoprazole    Tablet 40 milliGRAM(s) Oral before breakfast  polyethylene glycol 3350 17 Gram(s) Oral daily  senna 1 Tablet(s) Oral two times a day  tamsulosin 0.4 milliGRAM(s) Oral at bedtime    MEDICATIONS  (PRN):  acetaminophen     Tablet .. 650 milliGRAM(s) Oral every 6 hours PRN Temp greater or equal to 38C (100.4F), Mild Pain (1 - 3)  aluminum hydroxide/magnesium hydroxide/simethicone Suspension 30 milliLiter(s) Oral every 4 hours PRN Dyspepsia  melatonin 3 milliGRAM(s) Oral at bedtime PRN Insomnia  ondansetron Injectable 4 milliGRAM(s) IV Push every 8 hours PRN Nausea and/or Vomiting      Allergies    No Known Allergies    Intolerances        REVIEW OF SYSTEMS:  CONSTITUTIONAL: No fever, weight loss, or fatigue  EYES: No eye pain, visual disturbances, or discharge  ENMT:  No difficulty hearing, tinnitus, vertigo; No sinus or throat pain  NECK: No pain or stiffness  BREASTS: No pain, masses, or nipple discharge  RESPIRATORY: No cough, wheezing, chills or hemoptysis; positive  shortness of breath  CARDIOVASCULAR: No chest pain, palpitations, dizziness, or leg swelling  GASTROINTESTINAL: No abdominal or epigastric pain. No nausea, vomiting, or hematemesis; No diarrhea or constipation. No melena or hematochezia.  GENITOURINARY: No dysuria, frequency, hematuria, or incontinence  NEUROLOGICAL: No headaches, memory loss, loss of strength, numbness, or tremors  SKIN: No itching, burning, rashes, or lesions   LYMPH NODES: No enlarged glands  ENDOCRINE: No heat or cold intolerance; No hair loss  MUSCULOSKELETAL: No joint pain or swelling; No muscle, back, or extremity pain  PSYCHIATRIC: No depression, anxiety, mood swings, or difficulty sleeping  HEME/LYMPH: No easy bruising, or bleeding gums  ALLERGY AND IMMUNOLOGIC: No hives or eczema    Vital Signs Last 24 Hrs  T(C): 36.4 (03 Jul 2023 10:39), Max: 36.9 (03 Jul 2023 04:26)  T(F): 97.6 (03 Jul 2023 10:39), Max: 98.5 (03 Jul 2023 04:26)  HR: 86 (03 Jul 2023 10:39) (81 - 97)  BP: 110/74 (03 Jul 2023 10:39) (106/59 - 120/70)  BP(mean): --  RR: 18 (03 Jul 2023 10:39) (18 - 18)  SpO2: 96% (03 Jul 2023 10:39) (93% - 96%)    Parameters below as of 03 Jul 2023 09:30  Patient On (Oxygen Delivery Method): nasal cannula  O2 Flow (L/min): 2      PHYSICAL EXAM:  GENERAL: NAD, well-groomed, well-developed  HEAD:  Atraumatic, Normocephalic  EYES: EOMI, PERRLA, conjunctiva and sclera clear  ENMT: No tonsillar erythema, exudates, or enlargement; Moist mucous membranes, Good dentition, No lesions  NECK: Supple, No JVD, Normal thyroid  NERVOUS SYSTEM:  Alert & Oriented X3, Good concentration; Motor Strength 5/5 B/L upper and lower extremities; DTRs 2+ intact and symmetric  CHEST/LUNG: Clear to percussion bilaterally; No rales, rhonchi, wheezing, or rubs  HEART: Regular rate and rhythm; No murmurs, rubs, or gallops  ABDOMEN: Soft, Nontender, Nondistended; Bowel sounds present  EXTREMITIES:  2+ Peripheral Pulses, No clubbing, cyanosis, or edema  LYMPH: No lymphadenopathy noted  SKIN: No rashes or lesions    LABS:                        14.1   8.89  )-----------( 249      ( 03 Jul 2023 06:30 )             43.1     07-03    138  |  96  |  80<H>  ----------------------------<  139<H>  3.7   |  37<H>  |  2.00<H>    Ca    10.0      03 Jul 2023 06:30  Phos  3.4     07-02  Mg     2.1     07-02    TPro  6.9  /  Alb  2.9<L>  /  TBili  0.7  /  DBili  x   /  AST  26  /  ALT  26  /  AlkPhos  81  07-03      Urinalysis Basic - ( 03 Jul 2023 06:30 )    Color: x / Appearance: x / SG: x / pH: x  Gluc: 139 mg/dL / Ketone: x  / Bili: x / Urobili: x   Blood: x / Protein: x / Nitrite: x   Leuk Esterase: x / RBC: x / WBC x   Sq Epi: x / Non Sq Epi: x / Bacteria: x      CAPILLARY BLOOD GLUCOSE      POCT Blood Glucose.: 126 mg/dL (02 Jul 2023 12:01)      RADIOLOGY & ADDITIONAL TESTS:    Imaging Personally Reviewed:  [ X] YES  [ ] NO    Consultant(s) Notes Reviewed:  [ X] YES  [ ] NO    Care Discussed with Consultants/Other Providers [X ] YES  [ ] NO
PROGRESS NOTE:     Patient is a 89y old  Male who presents with a chief complaint of ACUTE RESPIRATORY FAILURE WITH HYPOXIA     (28 Jun 2023 14:10)      SUBJECTIVE / OVERNIGHT EVENTS: No acute events.     ADDITIONAL REVIEW OF SYSTEMS:    MEDICATIONS  (STANDING):  furosemide   Injectable 40 milliGRAM(s) IV Push two times a day  heparin   Injectable 5000 Unit(s) SubCutaneous every 12 hours  pantoprazole    Tablet 40 milliGRAM(s) Oral before breakfast  potassium chloride    Tablet ER 20 milliEquivalent(s) Oral every 2 hours  senna 1 Tablet(s) Oral two times a day  tamsulosin 0.4 milliGRAM(s) Oral at bedtime    MEDICATIONS  (PRN):  acetaminophen     Tablet .. 650 milliGRAM(s) Oral every 6 hours PRN Temp greater or equal to 38C (100.4F), Mild Pain (1 - 3)  albuterol/ipratropium for Nebulization 3 milliLiter(s) Nebulizer every 6 hours PRN Shortness of Breath and/or Wheezing  aluminum hydroxide/magnesium hydroxide/simethicone Suspension 30 milliLiter(s) Oral every 4 hours PRN Dyspepsia  melatonin 3 milliGRAM(s) Oral at bedtime PRN Insomnia  ondansetron Injectable 4 milliGRAM(s) IV Push every 8 hours PRN Nausea and/or Vomiting      CAPILLARY BLOOD GLUCOSE        I&O's Summary    28 Jun 2023 07:01  -  29 Jun 2023 07:00  --------------------------------------------------------  IN: 240 mL / OUT: 1750 mL / NET: -1510 mL    29 Jun 2023 07:01  -  29 Jun 2023 12:57  --------------------------------------------------------  IN: 240 mL / OUT: 0 mL / NET: 240 mL        PHYSICAL EXAM:  Vital Signs Last 24 Hrs  T(C): 36.8 (29 Jun 2023 11:03), Max: 37.3 (28 Jun 2023 23:39)  T(F): 98.3 (29 Jun 2023 11:03), Max: 99.2 (28 Jun 2023 23:39)  HR: 81 (29 Jun 2023 11:03) (68 - 81)  BP: 96/63 (29 Jun 2023 11:03) (96/63 - 115/72)  BP(mean): --  RR: 17 (29 Jun 2023 11:03) (16 - 18)  SpO2: 95% (29 Jun 2023 12:06) (87% - 97%)    Parameters below as of 29 Jun 2023 12:06  Patient On (Oxygen Delivery Method): nasal cannula  O2 Flow (L/min): 2        CONSTITUTIONAL: NAD, elderly and [place]   RESPIRATORY: Decreased air entry B/L bases, poor inspiratory effort no w/r/r  CARDIOVASCULAR: Regular rate and rhythm, normal S1 and S2, no murmur/rub/gallop; b/l lower extremity edema improving; Peripheral pulses are 2+ bilaterally  ABDOMEN: Nontender to palpation, normoactive bowel sounds, no rebound/guarding; No hepatosplenomegaly  MUSCLOSKELETAL: no clubbing or cyanosis of digits; no joint swelling or tenderness to palpation  NEURO: alert and awake, cranial Nerves II-XII intact, moves all extremities    LABS:                        14.3   6.33  )-----------( 185      ( 29 Jun 2023 07:53 )             44.8     06-29    138  |  92<L>  |  53<H>  ----------------------------<  116<H>  3.1<L>   |  39<H>  |  1.69<H>    Ca    9.2      29 Jun 2023 07:53  Phos  2.6     06-29  Mg     2.1     06-29            Urinalysis Basic - ( 29 Jun 2023 07:53 )    Color: x / Appearance: x / SG: x / pH: x  Gluc: 116 mg/dL / Ketone: x  / Bili: x / Urobili: x   Blood: x / Protein: x / Nitrite: x   Leuk Esterase: x / RBC: x / WBC x   Sq Epi: x / Non Sq Epi: x / Bacteria: x          RADIOLOGY & ADDITIONAL TESTS:  Results Reviewed:   Imaging Personally Reviewed:  Electrocardiogram Personally Reviewed:  TTE:   1. Left ventricular ejection fraction, by visual estimation, is 60 to   65%.   2. Technically difficult study.   3. Normal global left ventricular systolic function.   4. Normal left ventricular internal cavity size.   5. Spectral Doppler shows impaired relaxation pattern of left   ventricular myocardial filling (Grade I diastolic dysfunction).   6. Normal right ventricular size and function.   7. Mildly enlarged left atrium.   8. Normal right atrial size.   9. There is no evidence of pericardial effusion.  10. Mild thickening and calcification of the anterior and posterior   mitral valve leaflets.  11. Trace mitral valve regurgitation.  12. Sclerotic aortic valve with normal opening.  13. Mild pulmonic valve regurgitation.      COORDINATION OF CARE:  Care Discussed with Consultants/Other Providers [Y/N]:  Prior or Outpatient Records Reviewed [Y/N]:  
PROGRESS NOTE:     Patient is a 89y old  Male who presents with a chief complaint of SOB (27 Jun 2023 14:07)      SUBJECTIVE / OVERNIGHT EVENTS: No acute events.     ADDITIONAL REVIEW OF SYSTEMS:    MEDICATIONS  (STANDING):  furosemide   Injectable 40 milliGRAM(s) IV Push two times a day  heparin   Injectable 5000 Unit(s) SubCutaneous every 12 hours  pantoprazole    Tablet 40 milliGRAM(s) Oral before breakfast  senna 1 Tablet(s) Oral two times a day  tamsulosin 0.4 milliGRAM(s) Oral at bedtime    MEDICATIONS  (PRN):  acetaminophen     Tablet .. 650 milliGRAM(s) Oral every 6 hours PRN Temp greater or equal to 38C (100.4F), Mild Pain (1 - 3)  albuterol/ipratropium for Nebulization 3 milliLiter(s) Nebulizer every 6 hours PRN Shortness of Breath and/or Wheezing  aluminum hydroxide/magnesium hydroxide/simethicone Suspension 30 milliLiter(s) Oral every 4 hours PRN Dyspepsia  melatonin 3 milliGRAM(s) Oral at bedtime PRN Insomnia  ondansetron Injectable 4 milliGRAM(s) IV Push every 8 hours PRN Nausea and/or Vomiting      CAPILLARY BLOOD GLUCOSE        I&O's Summary    27 Jun 2023 07:01  -  28 Jun 2023 07:00  --------------------------------------------------------  IN: 690 mL / OUT: 1650 mL / NET: -960 mL        PHYSICAL EXAM:  Vital Signs Last 24 Hrs  T(C): 36.3 (28 Jun 2023 04:34), Max: 36.5 (27 Jun 2023 23:58)  T(F): 97.3 (28 Jun 2023 04:34), Max: 97.7 (27 Jun 2023 23:58)  HR: 76 (28 Jun 2023 09:00) (67 - 77)  BP: 108/70 (28 Jun 2023 09:00) (108/70 - 137/74)  BP(mean): --  RR: 17 (28 Jun 2023 04:34) (17 - 17)  SpO2: 97% (28 Jun 2023 09:00) (97% - 98%)    Parameters below as of 28 Jun 2023 09:00  Patient On (Oxygen Delivery Method): nasal cannula  O2 Flow (L/min): 3.5      CONSTITUTIONAL: NAD, elderly and [place]   RESPIRATORY: Decreased air entry B/L bases, poor inspiratory effort no w/r/r  CARDIOVASCULAR: Regular rate and rhythm, normal S1 and S2, no murmur/rub/gallop; 2+ b/l lower extremity edema; Peripheral pulses are 2+ bilaterally  ABDOMEN: Nontender to palpation, normoactive bowel sounds, no rebound/guarding; No hepatosplenomegaly  MUSCLOSKELETAL: no clubbing or cyanosis of digits; no joint swelling or tenderness to palpation  NEURO: alert and awake, cranial Nerves II-XII intact, moves all extremities    LABS:                        13.5   6.39  )-----------( 167      ( 28 Jun 2023 05:50 )             42.7     06-28    139  |  95<L>  |  44<H>  ----------------------------<  106<H>  3.5   |  39<H>  |  1.59<H>    Ca    8.8      28 Jun 2023 05:50  Phos  2.4     06-28  Mg     2.1     06-28      PTT - ( 26 Jun 2023 14:40 )  PTT:>200.0 sec      Urinalysis Basic - ( 28 Jun 2023 05:50 )    Color: x / Appearance: x / SG: x / pH: x  Gluc: 106 mg/dL / Ketone: x  / Bili: x / Urobili: x   Blood: x / Protein: x / Nitrite: x   Leuk Esterase: x / RBC: x / WBC x   Sq Epi: x / Non Sq Epi: x / Bacteria: x          RADIOLOGY & ADDITIONAL TESTS:  Results Reviewed:   Imaging Personally Reviewed:  Electrocardiogram Personally Reviewed:    COORDINATION OF CARE:  Care Discussed with Consultants/Other Providers [Y/N]:  Prior or Outpatient Records Reviewed [Y/N]:  
Patient is a 89y old  Male who presents with a chief complaint of SOB (03 Jul 2023 10:53)    INTERVAL HPI/OVERNIGHT EVENTS: No acute events overnight. HD stable.     MEDICATIONS  (STANDING):  albuterol/ipratropium for Nebulization 3 milliLiter(s) Nebulizer every 6 hours  furosemide    Tablet 40 milliGRAM(s) Oral daily  heparin   Injectable 5000 Unit(s) SubCutaneous every 12 hours  pantoprazole    Tablet 40 milliGRAM(s) Oral before breakfast  polyethylene glycol 3350 17 Gram(s) Oral daily  senna 1 Tablet(s) Oral two times a day  tamsulosin 0.4 milliGRAM(s) Oral at bedtime    MEDICATIONS  (PRN):  acetaminophen     Tablet .. 650 milliGRAM(s) Oral every 6 hours PRN Temp greater or equal to 38C (100.4F), Mild Pain (1 - 3)  aluminum hydroxide/magnesium hydroxide/simethicone Suspension 30 milliLiter(s) Oral every 4 hours PRN Dyspepsia  melatonin 3 milliGRAM(s) Oral at bedtime PRN Insomnia  ondansetron Injectable 4 milliGRAM(s) IV Push every 8 hours PRN Nausea and/or Vomiting      Allergies    No Known Allergies    Intolerances        REVIEW OF SYSTEMS: all negative with exception of above    Vital Signs Last 24 Hrs  T(C): 36.5 (04 Jul 2023 10:34), Max: 36.6 (04 Jul 2023 04:25)  T(F): 97.7 (04 Jul 2023 10:34), Max: 97.8 (04 Jul 2023 04:25)  HR: 74 (04 Jul 2023 10:34) (74 - 92)  BP: 101/67 (04 Jul 2023 10:34) (92/57 - 107/69)  BP(mean): --  RR: 18 (04 Jul 2023 10:34) (17 - 18)  SpO2: 97% (04 Jul 2023 10:34) (94% - 100%)    Parameters below as of 04 Jul 2023 04:25  Patient On (Oxygen Delivery Method): nasal cannula  O2 Flow (L/min): 2      PHYSICAL EXAM:  GENERAL: NAD, well-groomed  NERVOUS SYSTEM:  Alert & Oriented X3, Good concentration; Motor Strength 5/5 B/L upper and lower extremities; DTRs 2+ intact and symmetric  CHEST/LUNG: Clear to percussion bilaterally; No rales, rhonchi, wheezing, or rubs  HEART: Regular rate and rhythm; No murmurs, rubs, or gallops  ABDOMEN: Soft, Nontender, Nondistended; Bowel sounds present  EXTREMITIES:  2+ Peripheral Pulses, No clubbing, cyanosis, or edema      LABS:                        13.2   8.27  )-----------( 246      ( 04 Jul 2023 06:15 )             41.0     07-04    135  |  94<L>  |  78<H>  ----------------------------<  114<H>  4.1   |  37<H>  |  1.83<H>    Ca    10.1      04 Jul 2023 06:15  Phos  3.4     07-02  Mg     2.1     07-02    TPro  6.7  /  Alb  2.9<L>  /  TBili  0.7  /  DBili  x   /  AST  37  /  ALT  32  /  AlkPhos  78  07-04      Urinalysis Basic - ( 04 Jul 2023 06:15 )    Color: x / Appearance: x / SG: x / pH: x  Gluc: 114 mg/dL / Ketone: x  / Bili: x / Urobili: x   Blood: x / Protein: x / Nitrite: x   Leuk Esterase: x / RBC: x / WBC x   Sq Epi: x / Non Sq Epi: x / Bacteria: x      CAPILLARY BLOOD GLUCOSE          RADIOLOGY & ADDITIONAL TESTS:    Imaging Personally Reviewed:  [ ] YES  [ ] NO  < from: TTE Echo Complete w/o Contrast w/ Doppler (06.28.23 @ 09:18) >  Summary:   1. Left ventricular ejection fraction, by visual estimation, is 60 to   65%.   2. Technically difficult study.   3. Normal global left ventricular systolic function.   4. Normal left ventricular internal cavity size.   5. Spectral Doppler shows impaired relaxation patternof left   ventricular myocardial filling (Grade I diastolic dysfunction).   6. Normal right ventricular size and function.   7. Mildly enlarged left atrium.   8. Normal right atrial size.   9. There is no evidence of pericardial effusion.  10. Mild thickening and calcification of the anterior and posterior   mitral valve leaflets.  11. Trace mitral valve regurgitation.  12. Sclerotic aortic valve with normal opening.  13. Mild pulmonic valve regurgitation.      [id number] Freddie Dixon MD FACC, FASE, FACP  Electronically signed on 6/28/2023 at 3:52:18 PM            *** Final ***    < end of copied text >        Consultant(s) Notes Reviewed:  [ ] YES  [ ] NO    Care Discussed with Consultants/Other Providers [ ] YES  [ ] NO
PROGRESS NOTE:     Patient is a 89y old  Male who presents with a chief complaint of SOB (29 Jun 2023 12:57)      SUBJECTIVE / OVERNIGHT EVENTS: No acute events.     ADDITIONAL REVIEW OF SYSTEMS:    MEDICATIONS  (STANDING):  albuterol/ipratropium for Nebulization 3 milliLiter(s) Nebulizer every 6 hours  heparin   Injectable 5000 Unit(s) SubCutaneous every 12 hours  pantoprazole    Tablet 40 milliGRAM(s) Oral before breakfast  senna 1 Tablet(s) Oral two times a day  tamsulosin 0.4 milliGRAM(s) Oral at bedtime    MEDICATIONS  (PRN):  acetaminophen     Tablet .. 650 milliGRAM(s) Oral every 6 hours PRN Temp greater or equal to 38C (100.4F), Mild Pain (1 - 3)  aluminum hydroxide/magnesium hydroxide/simethicone Suspension 30 milliLiter(s) Oral every 4 hours PRN Dyspepsia  melatonin 3 milliGRAM(s) Oral at bedtime PRN Insomnia  ondansetron Injectable 4 milliGRAM(s) IV Push every 8 hours PRN Nausea and/or Vomiting      CAPILLARY BLOOD GLUCOSE        I&O's Summary    29 Jun 2023 07:01  -  30 Jun 2023 07:00  --------------------------------------------------------  IN: 660 mL / OUT: 1000 mL / NET: -340 mL        PHYSICAL EXAM:  Vital Signs Last 24 Hrs  T(C): 36.5 (30 Jun 2023 10:33), Max: 37.1 (30 Jun 2023 04:46)  T(F): 97.7 (30 Jun 2023 10:33), Max: 98.8 (30 Jun 2023 04:46)  HR: 73 (30 Jun 2023 13:08) (73 - 76)  BP: 97/60 (30 Jun 2023 13:08) (97/60 - 118/78)  BP(mean): --  RR: 18 (30 Jun 2023 10:33) (17 - 18)  SpO2: 94% (30 Jun 2023 13:08) (93% - 96%)    Parameters below as of 30 Jun 2023 13:08  Patient On (Oxygen Delivery Method): nasal cannula  O2 Flow (L/min): 2        CONSTITUTIONAL: NAD, elderly and [place]   RESPIRATORY: Decreased air entry B/L bases, poor inspiratory effort, mild wheezing   CARDIOVASCULAR: Regular rate and rhythm, normal S1 and S2, no murmur/rub/gallop; b/l lower extremity edema improving; Peripheral pulses are 2+ bilaterally  ABDOMEN: Nontender to palpation, normoactive bowel sounds, no rebound/guarding; No hepatosplenomegaly  MUSCLOSKELETAL: no clubbing or cyanosis of digits; no joint swelling or tenderness to palpation  NEURO: alert and awake, cranial Nerves II-XII intact, moves all extremities    LABS:                        14.4   5.76  )-----------( 221      ( 30 Jun 2023 07:29 )             44.7     06-29    138  |  92<L>  |  53<H>  ----------------------------<  116<H>  3.1<L>   |  39<H>  |  1.69<H>    Ca    9.2      29 Jun 2023 07:53  Phos  2.6     06-29  Mg     2.1     06-29            Urinalysis Basic - ( 29 Jun 2023 07:53 )    Color: x / Appearance: x / SG: x / pH: x  Gluc: 116 mg/dL / Ketone: x  / Bili: x / Urobili: x   Blood: x / Protein: x / Nitrite: x   Leuk Esterase: x / RBC: x / WBC x   Sq Epi: x / Non Sq Epi: x / Bacteria: x          RADIOLOGY & ADDITIONAL TESTS:  Results Reviewed:   Imaging Personally Reviewed:  Electrocardiogram Personally Reviewed:    COORDINATION OF CARE:  Care Discussed with Consultants/Other Providers [Y/N]:  Prior or Outpatient Records Reviewed [Y/N]:  
PROGRESS NOTE:     Patient is a 89y old  Male who presents with a chief complaint of SOB (26 Jun 2023 05:42)      SUBJECTIVE / OVERNIGHT EVENTS: No acute events.     ADDITIONAL REVIEW OF SYSTEMS:    MEDICATIONS  (STANDING):  furosemide   Injectable 40 milliGRAM(s) IV Push daily  heparin   Injectable 5000 Unit(s) SubCutaneous every 12 hours  pantoprazole    Tablet 40 milliGRAM(s) Oral before breakfast  potassium chloride   Powder 40 milliEquivalent(s) Oral every 4 hours  senna 1 Tablet(s) Oral two times a day  tamsulosin 0.4 milliGRAM(s) Oral at bedtime    MEDICATIONS  (PRN):  acetaminophen     Tablet .. 650 milliGRAM(s) Oral every 6 hours PRN Temp greater or equal to 38C (100.4F), Mild Pain (1 - 3)  albuterol/ipratropium for Nebulization 3 milliLiter(s) Nebulizer every 6 hours PRN Shortness of Breath and/or Wheezing  aluminum hydroxide/magnesium hydroxide/simethicone Suspension 30 milliLiter(s) Oral every 4 hours PRN Dyspepsia  melatonin 3 milliGRAM(s) Oral at bedtime PRN Insomnia  ondansetron Injectable 4 milliGRAM(s) IV Push every 8 hours PRN Nausea and/or Vomiting      CAPILLARY BLOOD GLUCOSE        I&O's Summary    26 Jun 2023 07:01  -  27 Jun 2023 07:00  --------------------------------------------------------  IN: 520 mL / OUT: 700 mL / NET: -180 mL    27 Jun 2023 07:01  -  27 Jun 2023 14:08  --------------------------------------------------------  IN: 360 mL / OUT: 600 mL / NET: -240 mL        PHYSICAL EXAM:  Vital Signs Last 24 Hrs  T(C): 36.7 (27 Jun 2023 10:31), Max: 37 (26 Jun 2023 19:00)  T(F): 98.1 (27 Jun 2023 10:31), Max: 98.6 (26 Jun 2023 19:00)  HR: 75 (27 Jun 2023 10:31) (70 - 75)  BP: 109/67 (27 Jun 2023 10:31) (109/67 - 139/76)  BP(mean): --  RR: 18 (27 Jun 2023 10:31) (18 - 18)  SpO2: 97% (27 Jun 2023 10:31) (94% - 99%)    Parameters below as of 27 Jun 2023 04:59  Patient On (Oxygen Delivery Method): nasal cannula        CONSTITUTIONAL: NAD, elderly and [place]   RESPIRATORY: Decreased air entry B/L bases, poor inspiratory effort no w/r/r  CARDIOVASCULAR: Regular rate and rhythm, normal S1 and S2, no murmur/rub/gallop; 2+ b/l lower extremity edema; Peripheral pulses are 2+ bilaterally  ABDOMEN: Nontender to palpation, normoactive bowel sounds, no rebound/guarding; No hepatosplenomegaly  MUSCLOSKELETAL: no clubbing or cyanosis of digits; no joint swelling or tenderness to palpation  PSYCH: A+O to person, place, and time; affect appropriate  NEURO: alert and awake, cranial Nerves II-XII intact, moves all extremities       LABS:                        13.8   8.21  )-----------( 179      ( 27 Jun 2023 06:30 )             43.0     06-27    142  |  96  |  44<H>  ----------------------------<  106<H>  3.3<L>   |  42<H>  |  1.75<H>    Ca    9.4      27 Jun 2023 06:30  Mg     2.0     06-27    TPro  7.0  /  Alb  3.0<L>  /  TBili  1.0  /  DBili  x   /  AST  21  /  ALT  16  /  AlkPhos  92  06-25    PT/INR - ( 26 Jun 2023 02:22 )   PT: 13.4 sec;   INR: 1.12 ratio         PTT - ( 26 Jun 2023 14:40 )  PTT:>200.0 sec      Urinalysis Basic - ( 27 Jun 2023 06:30 )    Color: x / Appearance: x / SG: x / pH: x  Gluc: 106 mg/dL / Ketone: x  / Bili: x / Urobili: x   Blood: x / Protein: x / Nitrite: x   Leuk Esterase: x / RBC: x / WBC x   Sq Epi: x / Non Sq Epi: x / Bacteria: x          RADIOLOGY & ADDITIONAL TESTS:  Results Reviewed:   Imaging Personally Reviewed:  Electrocardiogram Personally Reviewed:    COORDINATION OF CARE:  Care Discussed with Consultants/Other Providers [Y/N]:  Prior or Outpatient Records Reviewed [Y/N]:  
Patient is a 89y old  Male who presents with a chief complaint of SOB (30 Jun 2023 15:32)      INTERVAL HPI/OVERNIGHT EVENTS:  sob moderate to severe excertion     MEDICATIONS  (STANDING):  albuterol/ipratropium for Nebulization 3 milliLiter(s) Nebulizer every 6 hours  furosemide    Tablet 40 milliGRAM(s) Oral daily  heparin   Injectable 5000 Unit(s) SubCutaneous every 12 hours  pantoprazole    Tablet 40 milliGRAM(s) Oral before breakfast  senna 1 Tablet(s) Oral two times a day  tamsulosin 0.4 milliGRAM(s) Oral at bedtime    MEDICATIONS  (PRN):  acetaminophen     Tablet .. 650 milliGRAM(s) Oral every 6 hours PRN Temp greater or equal to 38C (100.4F), Mild Pain (1 - 3)  aluminum hydroxide/magnesium hydroxide/simethicone Suspension 30 milliLiter(s) Oral every 4 hours PRN Dyspepsia  melatonin 3 milliGRAM(s) Oral at bedtime PRN Insomnia  ondansetron Injectable 4 milliGRAM(s) IV Push every 8 hours PRN Nausea and/or Vomiting      Allergies    No Known Allergies    Intolerances        REVIEW OF SYSTEMS:  CONSTITUTIONAL: No fever, weight loss, or fatigue  EYES: No eye pain, visual disturbances, or discharge  ENMT:  No difficulty hearing, tinnitus, vertigo; No sinus or throat pain  NECK: No pain or stiffness  BREASTS: No pain, masses, or nipple discharge  RESPIRATORY: No cough, wheezing, chills or hemoptysis ;positive  shortness of breath on excertion   CARDIOVASCULAR: No chest pain, palpitations, dizziness, or leg swelling  GASTROINTESTINAL: No abdominal or epigastric pain. No nausea, vomiting, or hematemesis; No diarrhea or constipation. No melena or hematochezia.  GENITOURINARY: No dysuria, frequency, hematuria, or incontinence  NEUROLOGICAL: No headaches, memory loss, loss of strength, numbness, or tremors  SKIN: No itching, burning, rashes, or lesions   LYMPH NODES: No enlarged glands  ENDOCRINE: No heat or cold intolerance; No hair loss  MUSCULOSKELETAL: No joint pain or swelling; No muscle, back, or extremity pain  PSYCHIATRIC: No depression, anxiety, mood swings, or difficulty sleeping  HEME/LYMPH: No easy bruising, or bleeding gums  ALLERGY AND IMMUNOLOGIC: No hives or eczema    Vital Signs Last 24 Hrs  T(C): 36.8 (01 Jul 2023 04:32), Max: 37.1 (30 Jun 2023 16:03)  T(F): 98.2 (01 Jul 2023 04:32), Max: 98.7 (30 Jun 2023 16:03)  HR: 76 (01 Jul 2023 05:27) (72 - 78)  BP: 103/64 (01 Jul 2023 06:36) (97/60 - 153/85)  BP(mean): --  RR: 18 (01 Jul 2023 04:32) (18 - 18)  SpO2: 98% (01 Jul 2023 05:27) (94% - 98%)    Parameters below as of 01 Jul 2023 05:27  Patient On (Oxygen Delivery Method): nasal cannula        PHYSICAL EXAM:  GENERAL: NAD, well-groomed, well-developed  HEAD:  Atraumatic, Normocephalic  EYES: EOMI, PERRLA, conjunctiva and sclera clear  ENMT: No tonsillar erythema, exudates, or enlargement; Moist mucous membranes, Good dentition, No lesions  NECK: Supple, No JVD, Normal thyroid  NERVOUS SYSTEM:  Alert & Oriented X3, Good concentration; Motor Strength 5/5 B/L upper and lower extremities; DTRs 2+ intact and symmetric  CHEST/LUNG: Clear to percussion bilaterally; No rales, rhonchi, wheezing, or rubs  HEART: Regular rate and rhythm; No murmurs, rubs, or gallops  ABDOMEN: Soft, Nontender, Nondistended; Bowel sounds present  EXTREMITIES:  2+ Peripheral Pulses, No clubbing, cyanosis, or edema  LYMPH: No lymphadenopathy noted  SKIN: No rashes or lesions    LABS:                        14.4   5.76  )-----------( 221      ( 30 Jun 2023 07:29 )             44.7     07-01    134<L>  |  91<L>  |  72<H>  ----------------------------<  118<H>  3.4<L>   |  37<H>  |  1.85<H>    Ca    9.5      01 Jul 2023 06:05  Mg     2.0     07-01        Urinalysis Basic - ( 01 Jul 2023 06:05 )    Color: x / Appearance: x / SG: x / pH: x  Gluc: 118 mg/dL / Ketone: x  / Bili: x / Urobili: x   Blood: x / Protein: x / Nitrite: x   Leuk Esterase: x / RBC: x / WBC x   Sq Epi: x / Non Sq Epi: x / Bacteria: x      CAPILLARY BLOOD GLUCOSE          RADIOLOGY & ADDITIONAL TESTS:    Imaging Personally Reviewed:  [ X] YES  [ ] NO    Consultant(s) Notes Reviewed:  [ X] YES  [ ] NO    Care Discussed with Consultants/Other Providers [X ] YES  [ ] NO

## 2023-07-04 NOTE — DISCHARGE NOTE NURSING/CASE MANAGEMENT/SOCIAL WORK - NSDCVIVACCINE_GEN_ALL_CORE_FT
influenza, injectable, quadrivalent, preservative free; 08-Sep-2017 11:19; Kailey Bass (RN); [employer]; [id number]; IntraMuscular; Deltoid Right.; 0.5 milliLiter(s); VIS (VIS Published: 07-Aug-2015, VIS Presented: 08-Sep-2017);

## 2023-07-04 NOTE — PROGRESS NOTE ADULT - PROBLEM SELECTOR PLAN 6
CT chest w/ 6 mm left upper lobe lung nodule and 2 mm nodule in the lingula  Follow-up noncontrast chest CT is recommended in 6-12 months.
CT chest w/ 6 mm left upper lobe lung nodule and 2 mm nodule in the lingula  Follow-up noncontrast chest CT is recommended in 6-12 months. discuss and advise
CT chest w/ 6 mm left upper lobe lung nodule and 2 mm nodule in the lingula  Follow-up noncontrast chest CT is recommended in 6-12 months.
CT chest w/ 6 mm left upper lobe lung nodule and 2 mm nodule in the lingula  Follow-up noncontrast chest CT is recommended in 6-12 months. discuss and advise
CT chest w/ 6 mm left upper lobe lung nodule and 2 mm nodule in the lingula  Follow-up noncontrast chest CT is recommended in 6-12 months.
CT chest w/ 6 mm left upper lobe lung nodule and 2 mm nodule in the lingula  Follow-up noncontrast chest CT is recommended in 6-12 months.
CT chest w/ 6 mm left upper lobe lung nodule and 2 mm nodule in the lingula  Follow-up noncontrast chest CT is recommended in 6-12 months. discuss and advise
CT chest w/ 6 mm left upper lobe lung nodule and 2 mm nodule in the lingula  Follow-up noncontrast chest CT is recommended in 6-12 months. discuss and advise

## 2023-07-04 NOTE — DISCHARGE NOTE NURSING/CASE MANAGEMENT/SOCIAL WORK - NSDCPETBCESMAN_GEN_ALL_CORE
Patient:   ROB SANDHU            MRN: [id number]            FIN: [id number]              Age:   26 years     Sex:  FEMALE     :  [birth year]   Associated Diagnoses:   None   Author:   JAMEE FOURNIER     Basic Information   VS/Measurements   Vital Signs   20 11:10 CST Heart/Pulse Rate 83  Normal    NIBP Systolic 105  Normal    NIBP Diastolic 54  LOW    NIBP MAP 71  Normal   20 10:55 CST Heart/Pulse Rate 82  Normal    Respiration Rate 15 breaths/min  Normal    NIBP Systolic 105  Normal    NIBP Diastolic 67  Normal    NIBP MAP 80  Normal   20 10:40 CST Heart/Pulse Rate 85  Normal    Respiration Rate 15 breaths/min  Normal    NIBP Systolic 97  Normal    NIBP Diastolic 47  LOW    NIBP MAP 64  LOW   20 10:25 CST Heart/Pulse Rate 74  Normal    Respiration Rate 16 breaths/min  Normal    NIBP Systolic 115  Normal    NIBP Diastolic 69  Normal    NIBP MAP 84  Normal   20 10:08 CST Temperature - VS 36.5 deg_C  Normal    Temperature Source - VS Oral    Heart/Pulse Rate 83  Normal    Respiration Rate 16 breaths/min  Normal    NIBP Systolic 104  Normal    NIBP Diastolic 58  LOW    NIBP MAP 73  Normal       /Para:      Para Information:   : 3   Para Term: 1   Para : 0   Para Abortions: 1   Para Livin.    Providers   Referring provider:  [facility]   Reading provider: ANGELICA   Gestation: ognzales.   Maternal assessment   Fetal movement reported: present.   Contractions: none.   Leaking fluid: no.   Vaginal bleeding   no   Gestational Age:          * Note: EGA calculated as of 2020  ROSAURA: 2020 EGA*: 38 weeks 6 days            Type: Authoritative Method Date: 2019  Method: Last Menstrual Period (2019)  Confirmation: Confirmed  Description: --  Comments: --  Entered by: Saadia Gabriel on 2020   Other ROSAURA Calculations for this Pregnancy:   No additional ROSAURA calculations have been recorded for this pregnancy  .      Procedure    Fetal Well Being Procedure Burger   Procedure was not completed.     Fetal movement: present.     Heart tones: 125  bpm, reactive, rate and rhythm regular rhythm, baseline variability moderate, fetal heart rate accelerations present, fetal heart rate decelerations absent.    Fetal lie.     Uterine activity: normal.     Non-stress test interpretation: reactive.       Impression and Plan   Patient Education & Follow-up:  Fetal movement instruction, Labor warnings, Pre-eclamptic precautions, Hydration instructions.         Follow-up: PATIENT SCHEDULED FOR REPEAT  SECTION ON 3/2/2020 AT 7:30 AM.  PREOP CARE INSTRUCTIONS AND ARRIVAL TIME DISCUSSED WITH PATIENT AND WRITTEN INSTRUCTIONS GIVEN TO PATIENT.   ALL QUESTIONS AND CONCERNS ANSWERED FOR PATIENT..   If you are a smoker, it is important for your health to stop smoking. Please be aware that second hand smoke is also harmful.

## 2023-10-04 NOTE — PHYSICAL THERAPY INITIAL EVALUATION ADULT - ASSISTIVE DEVICE FOR TRANSFER: SIT/STAND, REHAB EVAL
rolling walker hx ILD and asthma - denies hx intubations, last exacerbation with hospitalization  in May 2023,  last steroids 8/10/2023  has home oxygen as needed prn

## 2023-11-13 LAB
BASE EXCESS BLDA CALC-SCNC: 17.4 MMOL/L — HIGH (ref -2–3)
BASE EXCESS BLDA CALC-SCNC: 17.4 MMOL/L — HIGH (ref -2–3)
CO2 BLDA-SCNC: 46 MMOL/L — HIGH (ref 19–24)
CO2 BLDA-SCNC: 46 MMOL/L — HIGH (ref 19–24)
HCO3 BLDA-SCNC: 44 MMOL/L — HIGH (ref 21–28)
HCO3 BLDA-SCNC: 44 MMOL/L — HIGH (ref 21–28)
PCO2 BLDA: 61 MMHG — HIGH (ref 32–46)
PCO2 BLDA: 61 MMHG — HIGH (ref 32–46)
PH BLDA: 7.47 — HIGH (ref 7.35–7.45)
PH BLDA: 7.47 — HIGH (ref 7.35–7.45)
PO2 BLDA: 47 MMHG — LOW (ref 83–108)
PO2 BLDA: 47 MMHG — LOW (ref 83–108)
SAO2 % BLDA: 76.9 % — LOW (ref 94–98)
SAO2 % BLDA: 76.9 % — LOW (ref 94–98)

## 2025-06-02 NOTE — OCCUPATIONAL THERAPY INITIAL EVALUATION ADULT - LEVEL OF INDEPENDENCE: BED TO CHAIR, REHAB EVAL
[Antalgic] : antalgic [de-identified] : On physical examination of the right hip.  The skin is clean dry and intact with no areas of redness swelling or heat noted.  There is some pain and tenderness which is mostly on the lateral aspect but is much more significant in the posterior lateral and lateral aspect.  This ranges from the right paraspinal muscles through the SI joint and along the posterior and posterior lateral aspect of the buttocks into the thigh.  She does have a positive straight leg test with Tinel's.  There is some increased a discomfort with internal rotation.  But is able to have good range of motion passive and actively.  There is no evidence of limb length discrepancy.  No evidence of any motor or sensory deficit.  Patient has good distal pulses and no calf tenderness. [de-identified] : X-rays of the right hip were taken in the office.  This included 2 views the AP pelvis as well as the lateral right hip.  The results show minimal narrowing of the joint spacing.  There is no evidence of any fracture or dislocation. unable to perform